# Patient Record
Sex: FEMALE | Race: WHITE | Employment: OTHER | URBAN - METROPOLITAN AREA
[De-identification: names, ages, dates, MRNs, and addresses within clinical notes are randomized per-mention and may not be internally consistent; named-entity substitution may affect disease eponyms.]

---

## 2017-03-01 ENCOUNTER — OFFICE VISIT (OUTPATIENT)
Dept: FAMILY MEDICINE CLINIC | Age: 82
End: 2017-03-01

## 2017-03-01 VITALS
WEIGHT: 142 LBS | TEMPERATURE: 99.2 F | DIASTOLIC BLOOD PRESSURE: 68 MMHG | BODY MASS INDEX: 22.24 KG/M2 | HEART RATE: 75 BPM | SYSTOLIC BLOOD PRESSURE: 136 MMHG | OXYGEN SATURATION: 98 %

## 2017-03-01 DIAGNOSIS — E03.9 ACQUIRED HYPOTHYROIDISM: ICD-10-CM

## 2017-03-01 DIAGNOSIS — I48.0 PAROXYSMAL ATRIAL FIBRILLATION (HCC): Primary | Chronic | ICD-10-CM

## 2017-03-01 DIAGNOSIS — R63.4 WEIGHT LOSS: ICD-10-CM

## 2017-03-01 DIAGNOSIS — F41.1 ANXIETY STATE: ICD-10-CM

## 2017-03-01 DIAGNOSIS — I10 ESSENTIAL HYPERTENSION: Chronic | ICD-10-CM

## 2017-03-01 PROCEDURE — 99214 OFFICE O/P EST MOD 30 MIN: CPT | Performed by: FAMILY MEDICINE

## 2017-03-01 RX ORDER — LORAZEPAM 0.5 MG/1
0.5 TABLET ORAL 3 TIMES DAILY PRN
Qty: 90 TABLET | Refills: 2 | Status: SHIPPED | OUTPATIENT
Start: 2017-03-01 | End: 2017-10-30 | Stop reason: SDUPTHER

## 2017-03-01 ASSESSMENT — ENCOUNTER SYMPTOMS
SHORTNESS OF BREATH: 0
RHINORRHEA: 1
CONSTIPATION: 1

## 2017-04-10 ENCOUNTER — OFFICE VISIT (OUTPATIENT)
Dept: FAMILY MEDICINE CLINIC | Age: 82
End: 2017-04-10

## 2017-04-10 VITALS
HEIGHT: 66 IN | BODY MASS INDEX: 22.82 KG/M2 | DIASTOLIC BLOOD PRESSURE: 72 MMHG | SYSTOLIC BLOOD PRESSURE: 124 MMHG | OXYGEN SATURATION: 98 % | HEART RATE: 73 BPM | WEIGHT: 142 LBS

## 2017-04-10 DIAGNOSIS — F41.1 ANXIETY STATE: ICD-10-CM

## 2017-04-10 DIAGNOSIS — Z01.818 PRE-OP EXAM: Primary | ICD-10-CM

## 2017-04-10 DIAGNOSIS — I48.0 PAROXYSMAL ATRIAL FIBRILLATION (HCC): Chronic | ICD-10-CM

## 2017-04-10 DIAGNOSIS — G60.3 IDIOPATHIC PROGRESSIVE NEUROPATHY: ICD-10-CM

## 2017-04-10 PROCEDURE — 99213 OFFICE O/P EST LOW 20 MIN: CPT | Performed by: FAMILY MEDICINE

## 2017-04-10 RX ORDER — MULTIVIT-MIN/IRON/FOLIC ACID/K 18-600-40
CAPSULE ORAL
COMMUNITY
End: 2017-08-09

## 2017-04-25 ENCOUNTER — TELEPHONE (OUTPATIENT)
Dept: DERMATOLOGY | Age: 82
End: 2017-04-25

## 2017-05-10 RX ORDER — DILTIAZEM HYDROCHLORIDE 180 MG/1
CAPSULE, COATED, EXTENDED RELEASE ORAL
Qty: 90 CAPSULE | Refills: 1 | Status: SHIPPED | OUTPATIENT
Start: 2017-05-10 | End: 2017-11-03 | Stop reason: SDUPTHER

## 2017-06-01 ENCOUNTER — OFFICE VISIT (OUTPATIENT)
Dept: DERMATOLOGY | Age: 82
End: 2017-06-01

## 2017-06-01 DIAGNOSIS — L57.0 AK (ACTINIC KERATOSIS): Primary | ICD-10-CM

## 2017-06-01 PROCEDURE — 99202 OFFICE O/P NEW SF 15 MIN: CPT | Performed by: DERMATOLOGY

## 2017-06-01 RX ORDER — FLUOROURACIL 50 MG/G
CREAM TOPICAL
Qty: 40 G | Refills: 0 | Status: SHIPPED | OUTPATIENT
Start: 2017-06-01 | End: 2017-08-09

## 2017-06-02 ENCOUNTER — OFFICE VISIT (OUTPATIENT)
Dept: FAMILY MEDICINE CLINIC | Age: 82
End: 2017-06-02

## 2017-06-02 VITALS
HEART RATE: 85 BPM | DIASTOLIC BLOOD PRESSURE: 74 MMHG | BODY MASS INDEX: 23.11 KG/M2 | OXYGEN SATURATION: 98 % | SYSTOLIC BLOOD PRESSURE: 132 MMHG | WEIGHT: 141 LBS

## 2017-06-02 DIAGNOSIS — I10 ESSENTIAL HYPERTENSION: Chronic | ICD-10-CM

## 2017-06-02 DIAGNOSIS — E03.9 ACQUIRED HYPOTHYROIDISM: ICD-10-CM

## 2017-06-02 DIAGNOSIS — R53.83 FATIGUE, UNSPECIFIED TYPE: Chronic | ICD-10-CM

## 2017-06-02 DIAGNOSIS — K59.02 CONSTIPATION DUE TO OUTLET DYSFUNCTION: ICD-10-CM

## 2017-06-02 DIAGNOSIS — I48.0 PAROXYSMAL ATRIAL FIBRILLATION (HCC): Primary | Chronic | ICD-10-CM

## 2017-06-02 DIAGNOSIS — D50.0 IRON DEFICIENCY ANEMIA DUE TO CHRONIC BLOOD LOSS: ICD-10-CM

## 2017-06-02 PROCEDURE — 99214 OFFICE O/P EST MOD 30 MIN: CPT | Performed by: FAMILY MEDICINE

## 2017-06-02 ASSESSMENT — ENCOUNTER SYMPTOMS
SHORTNESS OF BREATH: 0
CONSTIPATION: 1
RHINORRHEA: 1

## 2017-06-05 ENCOUNTER — HOSPITAL ENCOUNTER (OUTPATIENT)
Dept: OTHER | Age: 82
Discharge: HOME | End: 2017-06-05
Attending: FAMILY MEDICINE | Admitting: FAMILY MEDICINE

## 2017-06-05 LAB
A/G RATIO: 1.3 (ref 1.1–2.2)
ALBUMIN SERPL-MCNC: 4 G/DL (ref 3.4–5)
ALP BLD-CCNC: 117 U/L (ref 40–129)
ALT SERPL-CCNC: 14 U/L (ref 10–40)
ANION GAP SERPL CALCULATED.3IONS-SCNC: 16 MMOL/L (ref 3–16)
AST SERPL-CCNC: 14 U/L (ref 15–37)
BASOPHILS ABSOLUTE: 0 K/UL (ref 0–0.2)
BASOPHILS RELATIVE PERCENT: 1.2 %
BILIRUB SERPL-MCNC: 0.5 MG/DL (ref 0–1)
BUN BLDV-MCNC: 17 MG/DL (ref 7–20)
CALCIUM SERPL-MCNC: 9.2 MG/DL (ref 8.3–10.6)
CHLORIDE BLD-SCNC: 104 MMOL/L (ref 99–110)
CO2: 23 MMOL/L (ref 21–32)
CREAT SERPL-MCNC: 1.1 MG/DL (ref 0.6–1.2)
EOSINOPHILS ABSOLUTE: 0.1 K/UL (ref 0–0.6)
EOSINOPHILS RELATIVE PERCENT: 3.8 %
FERRITIN: 29.9 NG/ML (ref 15–150)
GFR AFRICAN AMERICAN: 57
GFR NON-AFRICAN AMERICAN: 47
GLOBULIN: 3.2 G/DL
GLUCOSE BLD-MCNC: 95 MG/DL (ref 70–99)
HCT VFR BLD CALC: 39.9 % (ref 36–48)
HEMOGLOBIN: 12.9 G/DL (ref 12–16)
LYMPHOCYTES ABSOLUTE: 1.2 K/UL (ref 1–5.1)
LYMPHOCYTES RELATIVE PERCENT: 32.1 %
MCH RBC QN AUTO: 30.9 PG (ref 26–34)
MCHC RBC AUTO-ENTMCNC: 32.5 G/DL (ref 31–36)
MCV RBC AUTO: 95.3 FL (ref 80–100)
MONOCYTES ABSOLUTE: 0.4 K/UL (ref 0–1.3)
MONOCYTES RELATIVE PERCENT: 9.8 %
NEUTROPHILS ABSOLUTE: 2 K/UL (ref 1.7–7.7)
NEUTROPHILS RELATIVE PERCENT: 53.1 %
PDW BLD-RTO: 13.2 % (ref 12.4–15.4)
PLATELET # BLD: 202 K/UL (ref 135–450)
PMV BLD AUTO: 8.9 FL (ref 5–10.5)
POTASSIUM SERPL-SCNC: 4.5 MMOL/L (ref 3.5–5.1)
RBC # BLD: 4.19 M/UL (ref 4–5.2)
SODIUM BLD-SCNC: 143 MMOL/L (ref 136–145)
T4 FREE: 1.2 NG/DL (ref 0.9–1.8)
TOTAL PROTEIN: 7.2 G/DL (ref 6.4–8.2)
TSH SERPL DL<=0.05 MIU/L-ACNC: 3.62 UIU/ML (ref 0.27–4.2)
WBC # BLD: 3.7 K/UL (ref 4–11)

## 2017-08-09 ENCOUNTER — OFFICE VISIT (OUTPATIENT)
Dept: FAMILY MEDICINE CLINIC | Age: 82
End: 2017-08-09

## 2017-08-09 VITALS
WEIGHT: 142 LBS | SYSTOLIC BLOOD PRESSURE: 138 MMHG | OXYGEN SATURATION: 98 % | DIASTOLIC BLOOD PRESSURE: 62 MMHG | BODY MASS INDEX: 23.27 KG/M2 | HEART RATE: 76 BPM

## 2017-08-09 DIAGNOSIS — H90.3 BILATERAL SENSORINEURAL HEARING LOSS: ICD-10-CM

## 2017-08-09 DIAGNOSIS — Z53.09 CONTRAINDICATION TO ANTICOAGULATION THERAPY: ICD-10-CM

## 2017-08-09 DIAGNOSIS — I48.0 PAROXYSMAL ATRIAL FIBRILLATION (HCC): Primary | Chronic | ICD-10-CM

## 2017-08-09 DIAGNOSIS — E03.9 ACQUIRED HYPOTHYROIDISM: ICD-10-CM

## 2017-08-09 DIAGNOSIS — F41.1 ANXIETY STATE: ICD-10-CM

## 2017-08-09 DIAGNOSIS — R26.89 IMBALANCE: ICD-10-CM

## 2017-08-09 PROCEDURE — 99214 OFFICE O/P EST MOD 30 MIN: CPT | Performed by: FAMILY MEDICINE

## 2017-08-09 RX ORDER — LEVOTHYROXINE SODIUM 0.05 MG/1
50 TABLET ORAL DAILY
Qty: 90 TABLET | Refills: 3 | Status: SHIPPED | OUTPATIENT
Start: 2017-08-09 | End: 2018-07-27 | Stop reason: SDUPTHER

## 2017-10-30 DIAGNOSIS — F41.1 ANXIETY STATE: ICD-10-CM

## 2017-10-31 RX ORDER — LORAZEPAM 0.5 MG/1
TABLET ORAL
Qty: 90 TABLET | Refills: 0 | Status: SHIPPED | OUTPATIENT
Start: 2017-10-31 | End: 2018-01-31 | Stop reason: SDUPTHER

## 2017-11-03 RX ORDER — DILTIAZEM HYDROCHLORIDE 180 MG/1
CAPSULE, COATED, EXTENDED RELEASE ORAL
Qty: 30 CAPSULE | Refills: 0 | Status: SHIPPED | OUTPATIENT
Start: 2017-11-03 | End: 2017-11-17 | Stop reason: SDUPTHER

## 2017-11-17 ENCOUNTER — OFFICE VISIT (OUTPATIENT)
Dept: FAMILY MEDICINE CLINIC | Age: 82
End: 2017-11-17

## 2017-11-17 VITALS
BODY MASS INDEX: 23.6 KG/M2 | HEART RATE: 72 BPM | WEIGHT: 144 LBS | SYSTOLIC BLOOD PRESSURE: 138 MMHG | DIASTOLIC BLOOD PRESSURE: 70 MMHG | OXYGEN SATURATION: 97 %

## 2017-11-17 DIAGNOSIS — I48.0 PAROXYSMAL ATRIAL FIBRILLATION (HCC): Primary | Chronic | ICD-10-CM

## 2017-11-17 DIAGNOSIS — D50.9 IRON DEFICIENCY ANEMIA, UNSPECIFIED IRON DEFICIENCY ANEMIA TYPE: ICD-10-CM

## 2017-11-17 DIAGNOSIS — F41.1 ANXIETY STATE: ICD-10-CM

## 2017-11-17 DIAGNOSIS — E03.9 ACQUIRED HYPOTHYROIDISM: ICD-10-CM

## 2017-11-17 PROCEDURE — 99213 OFFICE O/P EST LOW 20 MIN: CPT | Performed by: FAMILY MEDICINE

## 2017-11-17 RX ORDER — DILTIAZEM HYDROCHLORIDE 180 MG/1
CAPSULE, COATED, EXTENDED RELEASE ORAL
Qty: 30 CAPSULE | Refills: 5 | Status: SHIPPED | OUTPATIENT
Start: 2017-11-17 | End: 2017-12-07 | Stop reason: SDUPTHER

## 2017-11-17 NOTE — PROGRESS NOTES
Constitutional: She is oriented to person, place, and time. She appears well-developed and well-nourished. HENT:   Right Ear: External ear normal.   Left Ear: External ear normal.   Mouth/Throat: Oropharynx is clear and moist.   Neck: Decreased range of motion (she has decreased motion of her neck both in flexion and extension and rotation) present. Cardiovascular: Normal rate and intact distal pulses. An irregularly irregular rhythm present. Few varicosities LE   Pulmonary/Chest: Effort normal and breath sounds normal.   Abdominal: Soft. She exhibits no distension and no mass. There is no tenderness. There is no guarding. Musculoskeletal: She exhibits no edema. Lymphadenopathy:     She has no cervical adenopathy. Neurological: She is alert and oriented to person, place, and time. Psychiatric: Her mood appears anxious. Walter Chavez was seen today for hypertension, anxiety, atrial fibrillation and hypothyroidism. Diagnoses and all orders for this visit:    Paroxysmal atrial fibrillation (HCC)    Anxiety state    Acquired hypothyroidism    Iron deficiency anemia, unspecified iron deficiency anemia type    Other orders  -     diltiazem (CARDIZEM CD) 180 MG extended release capsule; TAKE 1 CAPSULE BY MOUTH DAILY     Patient refuses all immunizations including flu vaccine and pneumonia shot and shingles vaccine. I recommended that she stay on the same medications. [facility] report accessed and reviewed   I recommended that she get a CBC basic metabolic panel TSH and free T4 with copies sent to a cardiologist whom she is seeing next week. I offered to speak with him next Wednesday about her condition.  RTC in 3 months I refilled her Blanco

## 2017-11-18 ENCOUNTER — HOSPITAL ENCOUNTER (OUTPATIENT)
Dept: OTHER | Age: 82
Discharge: HOME | End: 2017-11-18
Attending: FAMILY MEDICINE | Admitting: FAMILY MEDICINE

## 2017-11-18 ENCOUNTER — TELEPHONE (OUTPATIENT)
Dept: FAMILY MEDICINE CLINIC | Age: 82
End: 2017-11-18

## 2017-11-18 LAB
ANION GAP SERPL CALCULATED.3IONS-SCNC: 14 MMOL/L (ref 3–16)
BASOPHILS ABSOLUTE: 0 K/UL (ref 0–0.2)
BASOPHILS RELATIVE PERCENT: 0.8 %
BUN BLDV-MCNC: 18 MG/DL (ref 7–20)
CALCIUM SERPL-MCNC: 9.5 MG/DL (ref 8.3–10.6)
CHLORIDE BLD-SCNC: 105 MMOL/L (ref 99–110)
CO2: 24 MMOL/L (ref 21–32)
CREAT SERPL-MCNC: 1 MG/DL (ref 0.6–1.2)
EOSINOPHILS ABSOLUTE: 0.1 K/UL (ref 0–0.6)
EOSINOPHILS RELATIVE PERCENT: 2.2 %
GFR AFRICAN AMERICAN: >60
GFR NON-AFRICAN AMERICAN: 52
GLUCOSE BLD-MCNC: 75 MG/DL (ref 70–99)
HCT VFR BLD CALC: 38.1 % (ref 36–48)
HEMOGLOBIN: 12.8 G/DL (ref 12–16)
LYMPHOCYTES ABSOLUTE: 1.4 K/UL (ref 1–5.1)
LYMPHOCYTES RELATIVE PERCENT: 37.3 %
MCH RBC QN AUTO: 32.3 PG (ref 26–34)
MCHC RBC AUTO-ENTMCNC: 33.6 G/DL (ref 31–36)
MCV RBC AUTO: 96.1 FL (ref 80–100)
MONOCYTES ABSOLUTE: 0.3 K/UL (ref 0–1.3)
MONOCYTES RELATIVE PERCENT: 9 %
NEUTROPHILS ABSOLUTE: 1.9 K/UL (ref 1.7–7.7)
NEUTROPHILS RELATIVE PERCENT: 50.7 %
PDW BLD-RTO: 13.2 % (ref 12.4–15.4)
PLATELET # BLD: 197 K/UL (ref 135–450)
PMV BLD AUTO: 9.1 FL (ref 5–10.5)
POTASSIUM SERPL-SCNC: 4.9 MMOL/L (ref 3.5–5.1)
RBC # BLD: 3.96 M/UL (ref 4–5.2)
SODIUM BLD-SCNC: 143 MMOL/L (ref 136–145)
T4 FREE: 1.3 NG/DL (ref 0.9–1.8)
TSH SERPL DL<=0.05 MIU/L-ACNC: 2.66 UIU/ML (ref 0.27–4.2)
WBC # BLD: 3.8 K/UL (ref 4–11)

## 2017-12-06 ENCOUNTER — TELEPHONE (OUTPATIENT)
Dept: CARDIOLOGY CLINIC | Age: 82
End: 2017-12-06

## 2017-12-06 NOTE — TELEPHONE ENCOUNTER
Notified patient that per Alondra Haynes would like for her to see a EP doctor soon possibly tomorrow. Please call patient to schedule an office visit.

## 2017-12-07 ENCOUNTER — OFFICE VISIT (OUTPATIENT)
Dept: CARDIOLOGY CLINIC | Age: 82
End: 2017-12-07

## 2017-12-07 VITALS
DIASTOLIC BLOOD PRESSURE: 72 MMHG | BODY MASS INDEX: 22.03 KG/M2 | HEIGHT: 67 IN | WEIGHT: 140.4 LBS | HEART RATE: 72 BPM | SYSTOLIC BLOOD PRESSURE: 124 MMHG | OXYGEN SATURATION: 98 %

## 2017-12-07 DIAGNOSIS — I48.0 PAROXYSMAL ATRIAL FIBRILLATION (HCC): Primary | Chronic | ICD-10-CM

## 2017-12-07 PROCEDURE — 99214 OFFICE O/P EST MOD 30 MIN: CPT | Performed by: INTERNAL MEDICINE

## 2017-12-07 PROCEDURE — 93000 ELECTROCARDIOGRAM COMPLETE: CPT | Performed by: INTERNAL MEDICINE

## 2017-12-07 RX ORDER — DILTIAZEM HYDROCHLORIDE 180 MG/1
CAPSULE, COATED, EXTENDED RELEASE ORAL
Qty: 90 CAPSULE | Refills: 1 | Status: SHIPPED | OUTPATIENT
Start: 2017-12-07 | End: 2018-01-03 | Stop reason: SDUPTHER

## 2017-12-07 NOTE — PROGRESS NOTES
[address]   Cardiac Follow-up    Referring Provider:  Tip Nye MD     Chief Complaint   Patient presents with    Atrial Fibrillation        History of Present Illness: Mrs. Meléndez presents today in follow up for irregular heart beat. She has a history of HTN, and anxiety. She was originally diagnosed with Atrial fib in November of 2011 but converted spontaneously without treatment. She presented to the ER in October 2012 with palpitations. ECG at that time showed sinus arrhythmia. She then wore a Holter monitor in 10/2012 which showed short burst of PAF and was started on Xarelto. Event recorder 2014 showed PAC's and brief PAT. She takes Ativan as needed for her anxiety. Today, Ms. Meléndez reports having an irregular heart beat. She doesn't necessarily feel this but when she takes her blood pressure her machine tells her she is having an irregular rhythm. She checks her blood pressure daily. She admits to being overly anxious and continues on Ativan for this. EKG shows SR with PAC's today. She recently (11/2) wore a monitor for ten days which was with [employer] [facility]. She is unaware of what it showed, but it was recommended that she take amiodarone. Past Medical History:   has a past medical history of Anxiety; Atrial fibrillation ([address] Disorder of bone and cartilage, unspecified; Fatigue; Hypertension; Migraine, unspecified, without mention of intractable migraine without mention of status migrainosus; Other disorders of kidney and ureter; Peripheral neuropathy ([address] Psychiatric problem; Risk for falls; Spinal stenosis; Thyroid disease; Unspecified glaucoma(365.9); and Unspecified hearing loss. Surgical History:   has a past surgical history that includes Colon surgery; Kidney surgery; Ovary removal; Appendectomy; eye surgery; and Cataract removal (Bilateral, 04/2017). Social History:   reports that she has never smoked.  She has never used smokeless tobacco. She reports that she does not drink alcohol or use drugs. Family History:  family history includes Heart Disease in her father, mother, and paternal aunt; High Blood Pressure in her mother; High Cholesterol in her mother; Stroke in her father. Home Medications:  Outpatient Encounter Prescriptions as of 12/7/2017   Medication Sig Dispense Refill    metoprolol tartrate (LOPRESSOR) 25 MG tablet Take 1 tablet by mouth daily 90 tablet 1    diltiazem (CARDIZEM CD) 180 MG extended release capsule TAKE 1 CAPSULE BY MOUTH DAILY 90 capsule 1    LORazepam (ATIVAN) 0.5 MG tablet TAKE ONE TABLET BY MOUTH THREE TIMES A DAY AS NEEDED FOR ANXIETY 90 tablet 0    levothyroxine (SYNTHROID) 50 MCG tablet Take 1 tablet by mouth daily 90 tablet 3    aspirin 81 MG tablet Take 81 mg by mouth 2 times daily      Multiple Vitamins-Minerals (THERAPEUTIC MULTIVITAMIN-MINERALS) tablet Take 1 tablet by mouth every other day      [DISCONTINUED] diltiazem (CARDIZEM CD) 180 MG extended release capsule TAKE 1 CAPSULE BY MOUTH DAILY 30 capsule 5     No facility-administered encounter medications on file as of 12/7/2017. Allergies:  Zoloft [sertraline hcl] and Sertraline     Review of Systems:   · Constitutional: there has been no unanticipated weight loss. There's been no change in energy level, sleep pattern, or activity level. · Eyes: No visual changes or diplopia. No scleral icterus. · ENT: No Headaches, hearing loss or vertigo. No mouth sores or sore throat. · Cardiovascular: Reviewed in HPI    · Respiratory: No cough or wheezing, no sputum production. No hematemesis. · Gastrointestinal: No abdominal pain, appetite loss, blood in stools. No change in bowel or bladder habits. · Genitourinary: No dysuria, trouble voiding, or hematuria. · Musculoskeletal:  No gait disturbance, weakness or joint complaints. · Integumentary: No rash or pruritis.   · Neurological: No headache, diplopia, change in muscle strength, numbness or tingling. No change in gait, balance, coordination, mood, affect, memory, mentation, behavior. · Psychiatric: History of anxiety  · Endocrine: No malaise, fatigue or temperature intolerance. No excessive thirst, fluid intake, or urination. No tremor. · Hematologic/Lymphatic: No abnormal bruising or bleeding, blood clots or swollen lymph nodes. · Allergic/Immunologic: No nasal congestion or hives. Physical Examination:    Vitals:    12/07/17 1152   BP: 124/72   Pulse: 72   SpO2: 98%        Constitutional and General Appearance: NAD   Respiratory:  · Normal excursion and expansion without use of accessory muscles  · Resp Auscultation: Normal breath sounds without dullness  Cardiovascular:  · The apical impulses not displaced  · Heart tones are crisp and normal  · Cervical veins are not engorged  · The carotid upstroke is normal in amplitude and contour without delay or bruit  · Normal S1S2, No S3, AS Murmur,  Rhythm is regular   · Peripheral pulses are symmetrical and full  · There is no clubbing, cyanosis of the extremities. · No edema. · Femoral Arteries: 2+ and equal  · Pedal Pulses: 2+ and equal   Abdomen:  · No masses or tenderness  · Liver/Spleen: No Abnormalities Noted  Neurological/Psychiatric:  · Alert and oriented in all spheres  · Moves all extremities well  · Exhibits normal gait balance and coordination  · No abnormalities of mood, affect, memory, mentation, or behavior are noted    Myoview 2/2017 normal    Echo 2/2016  Summary   -Normal left ventricle size, wall thickness and systolic function with an   estimated ejection fraction of 55%.   -Mild to moderate mitral regurgitation is present.   -Trivial aortic regurgitation is present.   -There is mild tricuspid regurgitation with RVSP estimated at 42 mmHg. Echo  5/13  Preserved LV systolic function with EF of 60%  Mild mitral regurgitation is present. Mild tricuspid regurgitation with RVSP estimated at 39 mmHg.     EKG today 12/7/17: SR PAC's HR 69    Assessment:     1. Paroxysmal Atrial fibrillation  PAF: In NSR with PAC's today  Event recorder 4/2014- PAF- burden unclear but probably minimal  Recent monitor worn with [facility]-will obtain report. Most of her ECGs show sinus rhythm with very frequent PACs, which might be mistaken for AF. 4. Anxiety/Depression: treated with prn Ativan> takes 3-5 a week. Plan:  1. Start Metoprolol 25 mg daily  2. Continue Cardizem for now  3. Will try to obtain report from monitor worn in November with [facility]  4.   Follow up in 3 months         Thank you for allowing me to participate in the care of this individual.    Naida Miguel MD

## 2017-12-08 ENCOUNTER — TELEPHONE (OUTPATIENT)
Dept: CARDIOLOGY CLINIC | Age: 82
End: 2017-12-08

## 2017-12-08 NOTE — TELEPHONE ENCOUNTER
Saw Dewey Kaufman yesterday and was confused . He told her in 2014 that she had a fib , but yesterday he told her she doesn't have a fib and he doesn't need to see her for 3 months. Should she keep appt with LES 12/21/17 ? Does she have a fib , did she not understand him ? Can nurse call her to explain ?

## 2017-12-08 NOTE — TELEPHONE ENCOUNTER
Called Dr Sol Aid office to obtain reports from event monitor worn 11/2 for 2 weeks. LMOM for JUAN Valdovinos. Left office number and fax number.

## 2017-12-11 ENCOUNTER — TELEPHONE (OUTPATIENT)
Dept: CARDIOLOGY CLINIC | Age: 82
End: 2017-12-11

## 2017-12-11 NOTE — TELEPHONE ENCOUNTER
Pt calling to adv that metoprolol is not agreeing with her it is slowing her heart beat down to 35.  Please call to adv thank you

## 2017-12-13 ENCOUNTER — NURSE ONLY (OUTPATIENT)
Dept: CARDIOLOGY CLINIC | Age: 82
End: 2017-12-13

## 2017-12-13 ENCOUNTER — TELEPHONE (OUTPATIENT)
Dept: CARDIOLOGY CLINIC | Age: 82
End: 2017-12-13

## 2017-12-13 DIAGNOSIS — R00.2 PALPITATIONS: Primary | Chronic | ICD-10-CM

## 2017-12-13 DIAGNOSIS — R53.83 OTHER FATIGUE: Chronic | ICD-10-CM

## 2017-12-13 DIAGNOSIS — I48.0 PAROXYSMAL ATRIAL FIBRILLATION (HCC): Chronic | ICD-10-CM

## 2017-12-13 PROCEDURE — 93000 ELECTROCARDIOGRAM COMPLETE: CPT | Performed by: INTERNAL MEDICINE

## 2017-12-13 NOTE — TELEPHONE ENCOUNTER
Patient came in for ECG (similar to ECG done in office on 12/7/17 - SR with PACs). Patient was prescribed Metoprolol 25 mg daily at recent visit. She took one dose, but felt funny several hours afterwards and did not take med again because it was the weekend and she didn't know what to do. She admits to being very nervous and wonders of her nervous makes her feel bad (tired, no energy etc). Will try to get event monitor results from Dr. Peña Padilla in [place] which was mentioned in recent visit note. She is asking if she should try Metoprolol again or should Diltiazem (currently on  mg) be increased as she is tolerating this medication. She also has appt with Dr. Estela Schaffer next week. Please advise of any changes needed.

## 2017-12-13 NOTE — TELEPHONE ENCOUNTER
Called patient and gave her med recommendations from Dr. Tristan العلي. She verbalized understanding. MCOT from Dr. Anna Amaro (cardiologist) are scanned in chart under media tab for your review.

## 2017-12-21 ENCOUNTER — OFFICE VISIT (OUTPATIENT)
Dept: CARDIOLOGY CLINIC | Age: 82
End: 2017-12-21

## 2017-12-21 VITALS
DIASTOLIC BLOOD PRESSURE: 60 MMHG | HEIGHT: 67 IN | SYSTOLIC BLOOD PRESSURE: 130 MMHG | HEART RATE: 64 BPM | WEIGHT: 143.5 LBS | BODY MASS INDEX: 22.52 KG/M2

## 2017-12-21 DIAGNOSIS — I48.0 PAROXYSMAL ATRIAL FIBRILLATION (HCC): Primary | Chronic | ICD-10-CM

## 2017-12-21 DIAGNOSIS — I10 ESSENTIAL HYPERTENSION: Chronic | ICD-10-CM

## 2017-12-21 DIAGNOSIS — R00.2 PALPITATIONS: Chronic | ICD-10-CM

## 2017-12-21 PROCEDURE — 99214 OFFICE O/P EST MOD 30 MIN: CPT | Performed by: INTERNAL MEDICINE

## 2017-12-21 NOTE — PROGRESS NOTES
[address]   Cardiac Follow-up    Referring Provider:  Opal Li MD     Chief Complaint   Patient presents with    Atrial Fibrillation    Hypertension    1 Year Follow Up        History of Present Illness: Mrs. Dolores Mahmood presents today in follow up for irregular heart beat. She has a history of HTN, and anxiety. She was originally diagnosed with Atrial fib in November of 2011 but converted spontaneously without treatment. She presented to the ER in October 2012 with palpitations. ECG at that time showed sinus arrhythmia. She then wore a Holter monitor in 10/2012 which showed short burst of PAF and was started on Xarelto. Event recorder 2014 showed PAC's and brief PAT. She takes Ativan as needed for her anxiety. Ms. Dolores Mahmood reports having an irregular heart beat. She doesn't necessarily feel this but when she takes her blood pressure her machine tells her she is having an irregular rhythm. She checks her blood pressure daily. She admits to being overly anxious and continues on Ativan for this. EKG shows SR with PAC's today. She recently (11/2) wore a monitor for ten days which was with Coni Valerio. She is unaware of what it showed, but it was recommended that she take amiodarone. Today she is here to establish with me, was a prior patient of Pham Glez. Sts is not on anticoagulation therapy due to anemia. Remains concerned with irregular heart rates. Says some days are better than others. Activity is limited by neuropathy and walks with a hurry cane. Has been monitoring BP am daily and ranges from 110/70 to 129/76. Past Medical History:   has a past medical history of Anxiety; Atrial fibrillation ([address] Disorder of bone and cartilage, unspecified; Fatigue; Hypertension; Migraine, unspecified, without mention of intractable migraine without mention of status migrainosus; Other disorders of kidney and ureter; Peripheral neuropathy ([address] Psychiatric problem;  Risk for regurgitation is present.   -Trivial aortic regurgitation is present.   -There is mild tricuspid regurgitation with RVSP estimated at 42 mmHg. Echo  5/13  Preserved LV systolic function with EF of 60%  Mild mitral regurgitation is present. Mild tricuspid regurgitation with RVSP estimated at 39 mmHg. EKG  12/7/17: SR PAC's HR 69    Assessment:     1. Paroxysmal Atrial fibrillation  PAF: In NSR with PAC's today  Event recorder 4/2014- PAF- burden unclear but probably minimal  Recent monitor worn with [facility]-report viewed in Care Everywhere  Most of her ECGs show sinus rhythm with very frequent PACs, which might be mistaken for AF. 4. Anxiety/Depression: treated with prn Ativan> takes 3-5 a week. Was reassured, that by recent cardiac testing, her cardiac status is stable. Plan:  1. Stop metoprolol as per   2. Continue Cardizem , take aspirin only one time a day. May take melatonin or Tylenol PM for sleep      Discussed concern for use of Ativan at bedtime as she may be unstable walking if getting up to the bathroom.   3.  See me in 6 months , same day as              Thank you for allowing me to participate in the care of this individual.

## 2018-01-04 RX ORDER — DILTIAZEM HYDROCHLORIDE 180 MG/1
CAPSULE, COATED, EXTENDED RELEASE ORAL
Qty: 90 CAPSULE | Refills: 1 | Status: SHIPPED | OUTPATIENT
Start: 2018-01-04 | End: 2018-11-02 | Stop reason: SDUPTHER

## 2018-01-25 ENCOUNTER — OFFICE VISIT (OUTPATIENT)
Dept: FAMILY MEDICINE CLINIC | Age: 83
End: 2018-01-25

## 2018-01-25 VITALS
HEART RATE: 62 BPM | WEIGHT: 145 LBS | TEMPERATURE: 98.5 F | BODY MASS INDEX: 22.71 KG/M2 | OXYGEN SATURATION: 98 % | DIASTOLIC BLOOD PRESSURE: 62 MMHG | SYSTOLIC BLOOD PRESSURE: 132 MMHG

## 2018-01-25 DIAGNOSIS — N39.0 URINARY TRACT INFECTION WITHOUT HEMATURIA, SITE UNSPECIFIED: Primary | ICD-10-CM

## 2018-01-25 LAB
BACTERIA URINE, POC: 0
BILIRUBIN URINE: 0 MG/DL
BLOOD, URINE: POSITIVE
CASTS URINE, POC: 0
CLARITY: ABNORMAL
COLOR: YELLOW
CRYSTALS URINE, POC: 0
EPI CELLS URINE, POC: 0
GLUCOSE URINE: NEGATIVE
KETONES, URINE: NEGATIVE
LEUKOCYTE EST, POC: ABNORMAL
NITRITE, URINE: NEGATIVE
PH UA: 5.5 (ref 4.5–8)
PROTEIN UA: NEGATIVE
RBC URINE, POC: 0
SPECIFIC GRAVITY UA: 1 (ref 1–1.03)
UROBILINOGEN, URINE: NORMAL
WBC URINE, POC: 8
YEAST URINE, POC: 0

## 2018-01-25 PROCEDURE — 81000 URINALYSIS NONAUTO W/SCOPE: CPT | Performed by: FAMILY MEDICINE

## 2018-01-25 PROCEDURE — 99212 OFFICE O/P EST SF 10 MIN: CPT | Performed by: FAMILY MEDICINE

## 2018-01-25 RX ORDER — CIPROFLOXACIN 500 MG/1
500 TABLET, FILM COATED ORAL 2 TIMES DAILY
Qty: 10 TABLET | Refills: 0 | Status: SHIPPED | OUTPATIENT
Start: 2018-01-25 | End: 2018-01-31

## 2018-01-31 ENCOUNTER — OFFICE VISIT (OUTPATIENT)
Dept: FAMILY MEDICINE CLINIC | Age: 83
End: 2018-01-31

## 2018-01-31 VITALS
SYSTOLIC BLOOD PRESSURE: 136 MMHG | HEART RATE: 83 BPM | OXYGEN SATURATION: 98 % | WEIGHT: 142 LBS | DIASTOLIC BLOOD PRESSURE: 84 MMHG | BODY MASS INDEX: 22.24 KG/M2

## 2018-01-31 DIAGNOSIS — F41.1 ANXIETY STATE: ICD-10-CM

## 2018-01-31 DIAGNOSIS — R35.0 URINARY FREQUENCY: ICD-10-CM

## 2018-01-31 DIAGNOSIS — I48.0 PAROXYSMAL ATRIAL FIBRILLATION (HCC): Chronic | ICD-10-CM

## 2018-01-31 DIAGNOSIS — I10 ESSENTIAL HYPERTENSION: Primary | Chronic | ICD-10-CM

## 2018-01-31 PROCEDURE — 99214 OFFICE O/P EST MOD 30 MIN: CPT | Performed by: FAMILY MEDICINE

## 2018-01-31 RX ORDER — LORAZEPAM 0.5 MG/1
TABLET ORAL
Qty: 90 TABLET | Refills: 0 | Status: SHIPPED | OUTPATIENT
Start: 2018-01-31 | End: 2018-02-28

## 2018-01-31 NOTE — PROGRESS NOTES
Chief Complaint   Patient presents with    Hypertension    Atrial Fibrillation    Hypothyroidism    Anxiety      Patient has complaints of :  Frequent urination times 3-4 weeks     Electronically signed by Tamanna Milan MA on 1/31/2018 at 2:28 PM     continue with frequent urination   did not take cipro, She was worried about side effects  Continues with imbalance and using cane   no falls    Patient is here for follow-up of the following problems:  Chief Complaint   Patient presents with    Hypertension    Atrial Fibrillation    Hypothyroidism    Anxiety      Take lorazepam prn anxiety, not every day    Hypertension:  Home blood pressure monitoring: No.  She is adherent to a low sodium diet. Patient denies chest pain and shortness of breath. Antihypertensive medication side effects: no medication side effects noted. Use of agents associated with hypertension: none. Sodium (mmol/L)   Date Value   11/18/2017 143    BUN (mg/dL)   Date Value   11/18/2017 18    Glucose (mg/dL)   Date Value   11/18/2017 75      Potassium (mmol/L)   Date Value   11/18/2017 4.9    CREATININE (mg/dL)   Date Value   11/18/2017 1.0              ROS         Physical Exam   Constitutional: She is oriented to person, place, and time. She appears well-developed and well-nourished. HENT:   Right Ear: External ear normal.   Left Ear: External ear normal.   Mouth/Throat: Oropharynx is clear and moist.   Cardiovascular: Normal rate and intact distal pulses. An irregularly irregular rhythm present. Few varicosities LE   Pulmonary/Chest: Effort normal and breath sounds normal.   Abdominal: Soft. She exhibits no distension and no mass. There is no tenderness. There is no guarding. Musculoskeletal: She exhibits no edema. Lymphadenopathy:     She has no cervical adenopathy. Neurological: She is alert and oriented to person, place, and time. Psychiatric: Her mood appears anxious.

## 2018-02-03 LAB
COLOR: NORMAL
CULTURE: ABNORMAL

## 2018-03-06 ENCOUNTER — OFFICE VISIT (OUTPATIENT)
Dept: CARDIOLOGY CLINIC | Age: 83
End: 2018-03-06

## 2018-03-06 VITALS
WEIGHT: 143 LBS | HEART RATE: 84 BPM | HEIGHT: 67 IN | DIASTOLIC BLOOD PRESSURE: 60 MMHG | BODY MASS INDEX: 22.44 KG/M2 | SYSTOLIC BLOOD PRESSURE: 138 MMHG

## 2018-03-06 DIAGNOSIS — R00.2 PALPITATIONS: Chronic | ICD-10-CM

## 2018-03-06 DIAGNOSIS — I48.91 ATRIAL FIBRILLATION, UNSPECIFIED TYPE (HCC): Primary | ICD-10-CM

## 2018-03-06 PROCEDURE — 99214 OFFICE O/P EST MOD 30 MIN: CPT | Performed by: NURSE PRACTITIONER

## 2018-03-06 PROCEDURE — 93000 ELECTROCARDIOGRAM COMPLETE: CPT | Performed by: NURSE PRACTITIONER

## 2018-03-06 RX ORDER — LORAZEPAM 0.5 MG/1
0.5 TABLET ORAL EVERY 6 HOURS PRN
COMMUNITY
End: 2018-07-27 | Stop reason: SDUPTHER

## 2018-03-06 NOTE — LETTER
mention of status migrainosus; Other disorders of kidney and ureter; Peripheral neuropathy ([address] Psychiatric problem; Risk for falls; Spinal stenosis; Thyroid disease; Unspecified glaucoma(365.9); and Unspecified hearing loss. Surgical History:   has a past surgical history that includes Colon surgery; Kidney surgery; Ovary removal; Appendectomy; eye surgery; and Cataract removal (Bilateral, 04/2017). Social History:   reports that she has never smoked. She has never used smokeless tobacco. She reports that she does not drink alcohol or use drugs. Family History:  family history includes Heart Disease in her father, mother, and paternal aunt; High Blood Pressure in her mother; High Cholesterol in her mother; Stroke in her father. Home Medications:  Outpatient Encounter Prescriptions as of 3/6/2018   Medication Sig Dispense Refill    LORazepam (ATIVAN) 0.5 MG tablet Take 0.5 mg by mouth every 6 hours as needed for Anxiety.  diltiazem (CARDIZEM CD) 180 MG extended release capsule TAKE 1 CAPSULE BY MOUTH DAILY 90 capsule 1    levothyroxine (SYNTHROID) 50 MCG tablet Take 1 tablet by mouth daily 90 tablet 3    aspirin 81 MG tablet Take 81 mg by mouth daily       Multiple Vitamins-Minerals (THERAPEUTIC MULTIVITAMIN-MINERALS) tablet Take 1 tablet by mouth every other day       No facility-administered encounter medications on file as of 3/6/2018. Allergies:  Zoloft [sertraline hcl] and Sertraline     Review of Systems:   · Constitutional: there has been no unanticipated weight loss. There's been no change in energy level, sleep pattern, or activity level. · Eyes: No visual changes or diplopia. No scleral icterus. · ENT: No Headaches, hearing loss or vertigo. No mouth sores or sore throat. · Cardiovascular: Reviewed in HPI    · Respiratory: No cough or wheezing, no sputum production. No hematemesis. · Gastrointestinal: No abdominal pain, appetite loss, blood in stools.  No

## 2018-03-06 NOTE — LETTER
mention of status migrainosus; Other disorders of kidney and ureter; Peripheral neuropathy ([address] Psychiatric problem; Risk for falls; Spinal stenosis; Thyroid disease; Unspecified glaucoma(365.9); and Unspecified hearing loss. Surgical History:   has a past surgical history that includes Colon surgery; Kidney surgery; Ovary removal; Appendectomy; eye surgery; and Cataract removal (Bilateral, 04/2017). Social History:   reports that she has never smoked. She has never used smokeless tobacco. She reports that she does not drink alcohol or use drugs. Family History:  family history includes Heart Disease in her father, mother, and paternal aunt; High Blood Pressure in her mother; High Cholesterol in her mother; Stroke in her father. Home Medications:  Outpatient Encounter Prescriptions as of 3/6/2018   Medication Sig Dispense Refill    LORazepam (ATIVAN) 0.5 MG tablet Take 0.5 mg by mouth every 6 hours as needed for Anxiety.  diltiazem (CARDIZEM CD) 180 MG extended release capsule TAKE 1 CAPSULE BY MOUTH DAILY 90 capsule 1    levothyroxine (SYNTHROID) 50 MCG tablet Take 1 tablet by mouth daily 90 tablet 3    aspirin 81 MG tablet Take 81 mg by mouth daily       Multiple Vitamins-Minerals (THERAPEUTIC MULTIVITAMIN-MINERALS) tablet Take 1 tablet by mouth every other day       No facility-administered encounter medications on file as of 3/6/2018. Allergies:  Zoloft [sertraline hcl] and Sertraline     Review of Systems:   · Constitutional: there has been no unanticipated weight loss. There's been no change in energy level, sleep pattern, or activity level. · Eyes: No visual changes or diplopia. No scleral icterus. · ENT: No Headaches, hearing loss or vertigo. No mouth sores or sore throat. · Cardiovascular: Reviewed in HPI    · Respiratory: No cough or wheezing, no sputum production. No hematemesis. · Gastrointestinal: No abdominal pain, appetite loss, blood in stools.  No change in bowel or bladder habits. · Genitourinary: No dysuria, trouble voiding, or hematuria. · Musculoskeletal:  No gait disturbance, weakness or joint complaints. · Integumentary: No rash or pruritis. · Neurological: No headache, diplopia, change in muscle strength, numbness or tingling. No change in gait, balance, coordination, mood, affect, memory, mentation, behavior. · Psychiatric: History of anxiety  · Endocrine: No malaise, fatigue or temperature intolerance. No excessive thirst, fluid intake, or urination. No tremor. · Hematologic/Lymphatic: No abnormal bruising or bleeding, blood clots or swollen lymph nodes. · Allergic/Immunologic: No nasal congestion or hives. Physical Examination:    Vitals:    03/06/18 1125   BP: 138/60   Pulse: 84        Constitutional and General Appearance: NAD   Skin:good turgor,intact without lesions  HEENT: EOMI ,normal  Neck:no JVD    Respiratory:  · Normal excursion and expansion without use of accessory muscles  · Resp Auscultation: Normal breath sounds without dullness  Cardiovascular:  · The apical impulses not displaced  · Heart tones are crisp and normal  · Cervical veins are not engorged  · The carotid upstroke is normal in amplitude and contour without delay or bruit  · Normal S1S2, No S3, AS Murmur,  Rhythm is irregular   · Peripheral pulses are symmetrical and full  · There is no clubbing, cyanosis of the extremities. · No edema. · Femoral Arteries: 2+ and equal  · Pedal Pulses: 2+ and equal   Abdomen:  · No masses or tenderness  · Liver/Spleen: No Abnormalities Noted  Neurological/Psychiatric:  · Alert and oriented in all spheres  · Moves all extremities well  · Exhibits normal gait balance and coordination  · No abnormalities of mood, affect, memory, mentation, or behavior are noted    Myoview 2/2017 normal    Echo 2/2016  Summary   -Normal left ventricle size, wall thickness and systolic function with an   estimated ejection fraction of 55%.  -Mild to moderate mitral regurgitation is present.   -Trivial aortic regurgitation is present.   -There is mild tricuspid regurgitation with RVSP estimated at 42 mmHg. Echo  5/13  Preserved LV systolic function with EF of 60%  Mild mitral regurgitation is present. Mild tricuspid regurgitation with RVSP estimated at 39 mmHg. Assessment:     1. Paroxysmal Atrial fibrillation  PAF: In NSR with PAC's today  Event recorder 4/2014- PAF- burden unclear but probably minimal  Recent monitor worn with [facility]-report viewed in Care Everywhere  Most of her ECGs show sinus rhythm with very frequent PACs, which might be mistaken for AF. 4. Anxiety/Depression: treated with prn Ativan> takes 3-5 a week. Was reassured, that by recent cardiac testing, her cardiac status is stable. Plan:  1. No change in medications  2. OV 6 months             Thank you for allowing me to participate in the care of this individual.            If you have questions, please do not hesitate to call me. I look forward to following James Suero along with you.     Sincerely,        Dixie Bolton NP

## 2018-03-06 NOTE — PROGRESS NOTES
History:   reports that she has never smoked. She has never used smokeless tobacco. She reports that she does not drink alcohol or use drugs. Family History:  family history includes Heart Disease in her father, mother, and paternal aunt; High Blood Pressure in her mother; High Cholesterol in her mother; Stroke in her father. Home Medications:  Outpatient Encounter Prescriptions as of 3/6/2018   Medication Sig Dispense Refill    LORazepam (ATIVAN) 0.5 MG tablet Take 0.5 mg by mouth every 6 hours as needed for Anxiety.  diltiazem (CARDIZEM CD) 180 MG extended release capsule TAKE 1 CAPSULE BY MOUTH DAILY 90 capsule 1    levothyroxine (SYNTHROID) 50 MCG tablet Take 1 tablet by mouth daily 90 tablet 3    aspirin 81 MG tablet Take 81 mg by mouth daily       Multiple Vitamins-Minerals (THERAPEUTIC MULTIVITAMIN-MINERALS) tablet Take 1 tablet by mouth every other day       No facility-administered encounter medications on file as of 3/6/2018. Allergies:  Zoloft [sertraline hcl] and Sertraline     Review of Systems:   · Constitutional: there has been no unanticipated weight loss. There's been no change in energy level, sleep pattern, or activity level. · Eyes: No visual changes or diplopia. No scleral icterus. · ENT: No Headaches, hearing loss or vertigo. No mouth sores or sore throat. · Cardiovascular: Reviewed in HPI    · Respiratory: No cough or wheezing, no sputum production. No hematemesis. · Gastrointestinal: No abdominal pain, appetite loss, blood in stools. No change in bowel or bladder habits. · Genitourinary: No dysuria, trouble voiding, or hematuria. · Musculoskeletal:  No gait disturbance, weakness or joint complaints. · Integumentary: No rash or pruritis. · Neurological: No headache, diplopia, change in muscle strength, numbness or tingling. No change in gait, balance, coordination, mood, affect, memory, mentation, behavior.   · Psychiatric: History of anxiety  · Endocrine:

## 2018-03-07 NOTE — COMMUNICATION BODY
No malaise, fatigue or temperature intolerance. No excessive thirst, fluid intake, or urination. No tremor. · Hematologic/Lymphatic: No abnormal bruising or bleeding, blood clots or swollen lymph nodes. · Allergic/Immunologic: No nasal congestion or hives. Physical Examination:    Vitals:    03/06/18 1125   BP: 138/60   Pulse: 84        Constitutional and General Appearance: NAD   Skin:good turgor,intact without lesions  HEENT: EOMI ,normal  Neck:no JVD    Respiratory:  · Normal excursion and expansion without use of accessory muscles  · Resp Auscultation: Normal breath sounds without dullness  Cardiovascular:  · The apical impulses not displaced  · Heart tones are crisp and normal  · Cervical veins are not engorged  · The carotid upstroke is normal in amplitude and contour without delay or bruit  · Normal S1S2, No S3, AS Murmur,  Rhythm is irregular   · Peripheral pulses are symmetrical and full  · There is no clubbing, cyanosis of the extremities. · No edema. · Femoral Arteries: 2+ and equal  · Pedal Pulses: 2+ and equal   Abdomen:  · No masses or tenderness  · Liver/Spleen: No Abnormalities Noted  Neurological/Psychiatric:  · Alert and oriented in all spheres  · Moves all extremities well  · Exhibits normal gait balance and coordination  · No abnormalities of mood, affect, memory, mentation, or behavior are noted    Myoview 2/2017 normal    Echo 2/2016  Summary   -Normal left ventricle size, wall thickness and systolic function with an   estimated ejection fraction of 55%.   -Mild to moderate mitral regurgitation is present.   -Trivial aortic regurgitation is present.   -There is mild tricuspid regurgitation with RVSP estimated at 42 mmHg. Echo  5/13  Preserved LV systolic function with EF of 60%  Mild mitral regurgitation is present. Mild tricuspid regurgitation with RVSP estimated at 39 mmHg. Assessment:     1.  Paroxysmal Atrial fibrillation  PAF: In NSR with PAC's today  Event recorder 4/2014-

## 2018-03-15 ENCOUNTER — TELEPHONE (OUTPATIENT)
Dept: FAMILY MEDICINE CLINIC | Age: 83
End: 2018-03-15

## 2018-03-16 ENCOUNTER — TELEPHONE (OUTPATIENT)
Dept: ADMINISTRATIVE | Age: 83
End: 2018-03-16

## 2018-03-19 ENCOUNTER — OFFICE VISIT (OUTPATIENT)
Dept: FAMILY MEDICINE CLINIC | Age: 83
End: 2018-03-19

## 2018-03-19 VITALS
HEART RATE: 78 BPM | DIASTOLIC BLOOD PRESSURE: 82 MMHG | WEIGHT: 141 LBS | OXYGEN SATURATION: 98 % | SYSTOLIC BLOOD PRESSURE: 144 MMHG | BODY MASS INDEX: 22.08 KG/M2

## 2018-03-19 DIAGNOSIS — S20.222D CONTUSION OF LEFT SIDE OF BACK, SUBSEQUENT ENCOUNTER: ICD-10-CM

## 2018-03-19 DIAGNOSIS — R26.89 IMBALANCE: Primary | ICD-10-CM

## 2018-03-19 PROBLEM — S20.222A CONTUSION OF LEFT SIDE OF BACK: Status: ACTIVE | Noted: 2018-03-19

## 2018-03-19 PROCEDURE — 99213 OFFICE O/P EST LOW 20 MIN: CPT | Performed by: FAMILY MEDICINE

## 2018-03-19 NOTE — PROGRESS NOTES
Subjective:      Patient ID: Courtney Kerr is a 80 y.o. female. HPI Patient is here for a Hospital follow up exam.    She was seen at [facility] on 3/15/18 for a fall. She fell backwards out of the shower and hit her left side on toilet and landed on the floor. Patient fell in the shower or getting out of the shower. She apparently has a shower chair but does not have a handheld shower and is weak getting up and sitting down. And fell out of the shower striking her lower back on the toilet she did not hit her head on the floor. She is alert on the floor for about 10 minutes because her  did not hear her fall. The patient has chronic numbness in her feet and some imbalance with this. She normally uses a cane to assist with ambulation. This happened 6 days ago and patient reports that her pain is still significant but slowly improving. She is having trouble getting in and out of the car and laying in bed. Current Outpatient Prescriptions on File Prior to Visit   Medication Sig Dispense Refill    LORazepam (ATIVAN) 0.5 MG tablet Take 0.5 mg by mouth every 6 hours as needed for Anxiety.  diltiazem (CARDIZEM CD) 180 MG extended release capsule TAKE 1 CAPSULE BY MOUTH DAILY 90 capsule 1    levothyroxine (SYNTHROID) 50 MCG tablet Take 1 tablet by mouth daily 90 tablet 3    aspirin 81 MG tablet Take 81 mg by mouth daily       Multiple Vitamins-Minerals (THERAPEUTIC MULTIVITAMIN-MINERALS) tablet Take 1 tablet by mouth every other day       No current facility-administered medications on file prior to visit. Review of Systems    Objective:   Physical Exam   Constitutional: She appears well-developed and well-nourished. Cardiovascular: Normal rate. An irregularly irregular rhythm present. Musculoskeletal:   There is no tenderness along the spine. There is a moderate sized bruise noted over the left iliac crest extending into the left buttocks area.  fair range of motion in the hip

## 2018-04-25 ENCOUNTER — OFFICE VISIT (OUTPATIENT)
Dept: FAMILY MEDICINE CLINIC | Age: 83
End: 2018-04-25

## 2018-04-25 VITALS
WEIGHT: 141 LBS | HEART RATE: 79 BPM | SYSTOLIC BLOOD PRESSURE: 138 MMHG | OXYGEN SATURATION: 98 % | DIASTOLIC BLOOD PRESSURE: 64 MMHG | BODY MASS INDEX: 22.08 KG/M2

## 2018-04-25 DIAGNOSIS — F41.1 ANXIETY STATE: ICD-10-CM

## 2018-04-25 DIAGNOSIS — R53.82 CHRONIC FATIGUE: Chronic | ICD-10-CM

## 2018-04-25 DIAGNOSIS — I48.0 PAROXYSMAL ATRIAL FIBRILLATION (HCC): Primary | Chronic | ICD-10-CM

## 2018-04-25 PROCEDURE — 99214 OFFICE O/P EST MOD 30 MIN: CPT | Performed by: FAMILY MEDICINE

## 2018-06-20 ENCOUNTER — OFFICE VISIT (OUTPATIENT)
Dept: CARDIOLOGY CLINIC | Age: 83
End: 2018-06-20

## 2018-06-20 VITALS
HEIGHT: 66 IN | BODY MASS INDEX: 22.98 KG/M2 | DIASTOLIC BLOOD PRESSURE: 72 MMHG | SYSTOLIC BLOOD PRESSURE: 136 MMHG | WEIGHT: 143 LBS | HEART RATE: 68 BPM

## 2018-06-20 DIAGNOSIS — I48.0 PAROXYSMAL ATRIAL FIBRILLATION (HCC): Primary | Chronic | ICD-10-CM

## 2018-06-20 DIAGNOSIS — R53.82 CHRONIC FATIGUE: ICD-10-CM

## 2018-06-20 PROCEDURE — 99213 OFFICE O/P EST LOW 20 MIN: CPT | Performed by: INTERNAL MEDICINE

## 2018-06-28 RX ORDER — DILTIAZEM HYDROCHLORIDE 180 MG/1
CAPSULE, COATED, EXTENDED RELEASE ORAL
Qty: 90 CAPSULE | Refills: 3 | Status: SHIPPED | OUTPATIENT
Start: 2018-06-28 | End: 2018-07-27 | Stop reason: SDUPTHER

## 2018-07-02 ENCOUNTER — TELEPHONE (OUTPATIENT)
Dept: FAMILY MEDICINE CLINIC | Age: 83
End: 2018-07-02

## 2018-07-27 ENCOUNTER — OFFICE VISIT (OUTPATIENT)
Dept: FAMILY MEDICINE CLINIC | Age: 83
End: 2018-07-27

## 2018-07-27 VITALS
OXYGEN SATURATION: 96 % | DIASTOLIC BLOOD PRESSURE: 64 MMHG | SYSTOLIC BLOOD PRESSURE: 124 MMHG | HEART RATE: 77 BPM | WEIGHT: 143.6 LBS | BODY MASS INDEX: 23.18 KG/M2

## 2018-07-27 DIAGNOSIS — E03.9 ACQUIRED HYPOTHYROIDISM: ICD-10-CM

## 2018-07-27 DIAGNOSIS — F51.04 PSYCHOPHYSIOLOGICAL INSOMNIA: ICD-10-CM

## 2018-07-27 DIAGNOSIS — R73.9 HYPERGLYCEMIA: ICD-10-CM

## 2018-07-27 DIAGNOSIS — F41.1 ANXIETY STATE: Primary | ICD-10-CM

## 2018-07-27 DIAGNOSIS — G60.3 IDIOPATHIC PROGRESSIVE NEUROPATHY: ICD-10-CM

## 2018-07-27 PROCEDURE — 99214 OFFICE O/P EST MOD 30 MIN: CPT | Performed by: FAMILY MEDICINE

## 2018-07-27 RX ORDER — LORAZEPAM 0.5 MG/1
0.5 TABLET ORAL 3 TIMES DAILY
Qty: 90 TABLET | Refills: 0 | Status: SHIPPED | OUTPATIENT
Start: 2018-07-27 | End: 2018-12-14 | Stop reason: SDUPTHER

## 2018-07-27 RX ORDER — MIRTAZAPINE 7.5 MG/1
7.5 TABLET, FILM COATED ORAL NIGHTLY
Qty: 30 TABLET | Refills: 3 | Status: SHIPPED | OUTPATIENT
Start: 2018-07-27 | End: 2018-11-02

## 2018-07-27 RX ORDER — LEVOTHYROXINE SODIUM 0.05 MG/1
50 TABLET ORAL DAILY
Qty: 90 TABLET | Refills: 3 | Status: SHIPPED | OUTPATIENT
Start: 2018-07-27 | End: 2019-08-01 | Stop reason: SDUPTHER

## 2018-07-27 ASSESSMENT — PATIENT HEALTH QUESTIONNAIRE - PHQ9
SUM OF ALL RESPONSES TO PHQ9 QUESTIONS 1 & 2: 2
2. FEELING DOWN, DEPRESSED OR HOPELESS: 1
SUM OF ALL RESPONSES TO PHQ QUESTIONS 1-9: 2
1. LITTLE INTEREST OR PLEASURE IN DOING THINGS: 1

## 2018-07-27 ASSESSMENT — ENCOUNTER SYMPTOMS
CONSTIPATION: 0
RHINORRHEA: 1
SHORTNESS OF BREATH: 0

## 2018-07-27 NOTE — PROGRESS NOTES
SUBJECTIVE:    Patient ID: Kelin Phillips is a 80 y.o. female. HPI Patient is here for a follow-up with a concern about both leg pain. Patient has numbness and tingling. Patient states that she cannot move her toes. Has been ongoing for three years and it is gradually worsening. Patient has also has problems with her bowel and her anxiety is gradually worsening. Patient remains very anxious. She had evaluation of her legs  3 years ago by Dr. Gill Moon, is found to have a severe peripheral neuropathy. This is thought to be idiopathic in nature. She had some weakness of the left foot at that time and was suggested to even wear an AFO,     OTC medications has been reviewed. Also has a fib      Hypertension:  Home blood pressure monitoring: No.  She is adherent to a low sodium diet. Patient denies chest pain and shortness of breath. Antihypertensive medication side effects: no medication side effects noted. Use of agents associated with hypertension: none. Sodium (mmol/L)   Date Value   03/15/2018 139    BUN (mg/dL)   Date Value   03/15/2018 18    Glucose (mg/dL)   Date Value   03/15/2018 105 (H)      Potassium (mmol/L)   Date Value   03/15/2018 4.1    CREATININE (mg/dL)   Date Value   03/15/2018 0.9         Has excessive worry and nervousness   takes lorazepam, prn   has peripheral neuropathy and uses cane   no falls, had EMG 2015   now cannot move left toes very well    Has history of a fib, paroxysmal  A fib   Holter last fall 14 % a fib  Gets short of breath with exertion    Current Outpatient Prescriptions on File Prior to Visit   Medication Sig Dispense Refill    LORazepam (ATIVAN) 0.5 MG tablet Take 0.5 mg by mouth every 6 hours as needed for Anxiety.       diltiazem (CARDIZEM CD) 180 MG extended release capsule TAKE 1 CAPSULE BY MOUTH DAILY 90 capsule 1    levothyroxine (SYNTHROID) 50 MCG tablet Take 1 tablet by mouth daily 90 tablet 3    aspirin 81 MG tablet

## 2018-09-27 ENCOUNTER — HOSPITAL ENCOUNTER (OUTPATIENT)
Age: 83
Discharge: HOME | End: 2018-09-27
Payer: MEDICARE

## 2018-09-27 DIAGNOSIS — R73.9 HYPERGLYCEMIA: ICD-10-CM

## 2018-09-27 DIAGNOSIS — E03.9 ACQUIRED HYPOTHYROIDISM: ICD-10-CM

## 2018-09-27 DIAGNOSIS — G60.3 IDIOPATHIC PROGRESSIVE NEUROPATHY: ICD-10-CM

## 2018-09-27 LAB
ANION GAP SERPL CALCULATED.3IONS-SCNC: 12 MMOL/L (ref 3–16)
BUN BLDV-MCNC: 17 MG/DL (ref 7–20)
CALCIUM SERPL-MCNC: 9.7 MG/DL (ref 8.3–10.6)
CHLORIDE BLD-SCNC: 104 MMOL/L (ref 99–110)
CO2: 24 MMOL/L (ref 21–32)
CREAT SERPL-MCNC: 1 MG/DL (ref 0.6–1.2)
FOLATE: >20 NG/ML (ref 4.78–24.2)
GFR AFRICAN AMERICAN: >60
GFR NON-AFRICAN AMERICAN: 52
GLUCOSE BLD-MCNC: 96 MG/DL (ref 70–99)
POTASSIUM SERPL-SCNC: 4.6 MMOL/L (ref 3.5–5.1)
SODIUM BLD-SCNC: 140 MMOL/L (ref 136–145)
TSH SERPL DL<=0.05 MIU/L-ACNC: 2.58 UIU/ML (ref 0.27–4.2)
VITAMIN B-12: 293 PG/ML (ref 211–911)

## 2018-09-27 PROCEDURE — 80048 BASIC METABOLIC PNL TOTAL CA: CPT

## 2018-09-27 PROCEDURE — 82607 VITAMIN B-12: CPT

## 2018-09-27 PROCEDURE — 84443 ASSAY THYROID STIM HORMONE: CPT

## 2018-09-27 PROCEDURE — 82746 ASSAY OF FOLIC ACID SERUM: CPT

## 2018-09-27 PROCEDURE — 83036 HEMOGLOBIN GLYCOSYLATED A1C: CPT

## 2018-09-28 LAB
ESTIMATED AVERAGE GLUCOSE: 108.3 MG/DL
HBA1C MFR BLD: 5.4 %

## 2018-11-02 ENCOUNTER — OFFICE VISIT (OUTPATIENT)
Dept: FAMILY MEDICINE CLINIC | Age: 83
End: 2018-11-02
Payer: MEDICARE

## 2018-11-02 VITALS
OXYGEN SATURATION: 98 % | WEIGHT: 145 LBS | HEART RATE: 68 BPM | SYSTOLIC BLOOD PRESSURE: 140 MMHG | DIASTOLIC BLOOD PRESSURE: 64 MMHG | BODY MASS INDEX: 23.4 KG/M2

## 2018-11-02 DIAGNOSIS — I10 ESSENTIAL HYPERTENSION: Chronic | ICD-10-CM

## 2018-11-02 DIAGNOSIS — R53.82 CHRONIC FATIGUE: Chronic | ICD-10-CM

## 2018-11-02 DIAGNOSIS — G60.3 IDIOPATHIC PROGRESSIVE NEUROPATHY: ICD-10-CM

## 2018-11-02 DIAGNOSIS — I48.0 PAROXYSMAL ATRIAL FIBRILLATION (HCC): Primary | Chronic | ICD-10-CM

## 2018-11-02 PROCEDURE — 99214 OFFICE O/P EST MOD 30 MIN: CPT | Performed by: FAMILY MEDICINE

## 2018-11-02 RX ORDER — LANOLIN ALCOHOL/MO/W.PET/CERES
2000 CREAM (GRAM) TOPICAL DAILY
COMMUNITY

## 2018-11-02 RX ORDER — DILTIAZEM HYDROCHLORIDE 180 MG/1
CAPSULE, COATED, EXTENDED RELEASE ORAL
Qty: 90 CAPSULE | Refills: 3 | Status: SHIPPED | OUTPATIENT
Start: 2018-11-02 | End: 2019-03-26 | Stop reason: SINTOL

## 2018-11-02 ASSESSMENT — PATIENT HEALTH QUESTIONNAIRE - PHQ9
SUM OF ALL RESPONSES TO PHQ QUESTIONS 1-9: 1
SUM OF ALL RESPONSES TO PHQ9 QUESTIONS 1 & 2: 1
SUM OF ALL RESPONSES TO PHQ QUESTIONS 1-9: 1
2. FEELING DOWN, DEPRESSED OR HOPELESS: 1
1. LITTLE INTEREST OR PLEASURE IN DOING THINGS: 0

## 2018-11-02 ASSESSMENT — ENCOUNTER SYMPTOMS
CONSTIPATION: 1
SHORTNESS OF BREATH: 1

## 2018-11-02 NOTE — PROGRESS NOTES
No current facility-administered medications on file prior to visit. Review of Systems   Constitutional: Positive for fatigue. Respiratory: Positive for shortness of breath. Gastrointestinal: Positive for constipation. Neurological: Positive for dizziness and numbness. All other systems reviewed and are negative. Physical Exam   Constitutional: She is oriented to person, place, and time. She appears well-developed and well-nourished. HENT:   Right Ear: External ear normal.   Left Ear: External ear normal.   Mouth/Throat: Oropharynx is clear and moist.   Cardiovascular: Normal rate, regular rhythm and intact distal pulses. Occasional extrasystoles are present. Few varicosities LE   Pulmonary/Chest: Effort normal and breath sounds normal.   Abdominal: Soft. She exhibits no distension and no mass. There is no tenderness. There is no guarding. Musculoskeletal: She exhibits no edema. Lymphadenopathy:     She has no cervical adenopathy. Neurological: She is alert and oriented to person, place, and time. Psychiatric: Her mood appears anxious. She does not exhibit a depressed mood. Juju Harden was seen today for hypertension, hypothyroidism and insomnia. Diagnoses and all orders for this visit:    Paroxysmal atrial fibrillation (HCC)    Essential hypertension    Chronic fatigue    Idiopathic progressive neuropathy    Other orders  -     diltiazem (CARDIZEM CD) 180 MG extended release capsule; TAKE 1 CAPSULE BY MOUTH DAILY    Discussed with patient using the MiraLAX or prune juice in addition continue with Dulcolax suppository 3 times a week for constipation. Discussed the importance of staying on the diltiazem for rate control for when she goes into atrial fibrillation. She is to continue on the aspirin each day. Discussed the importance of patient using a walker in order to prevent falls at home.  I reviewed her laboratory study showing the TSH B12 was normal but the low side of normal so patient is supplementing now with oral B12 , asymmetric metabolic panel was normal. CBC was normal earlier this year. Patient is taking the rates the pain sparingly. [facility] report accessed and reviewed , she did not need a refill on her lorazepam today. She is to follow-up here in 3 months, counseling majority of the 25 minute visit.

## 2018-12-14 DIAGNOSIS — F41.1 ANXIETY STATE: ICD-10-CM

## 2018-12-14 RX ORDER — LORAZEPAM 0.5 MG/1
TABLET ORAL
Qty: 90 TABLET | Refills: 0 | Status: SHIPPED | OUTPATIENT
Start: 2018-12-14 | End: 2019-01-13

## 2018-12-27 ENCOUNTER — OFFICE VISIT (OUTPATIENT)
Dept: CARDIOLOGY CLINIC | Age: 83
End: 2018-12-27
Payer: MEDICARE

## 2018-12-27 VITALS
HEART RATE: 64 BPM | HEIGHT: 66 IN | SYSTOLIC BLOOD PRESSURE: 138 MMHG | BODY MASS INDEX: 23.11 KG/M2 | DIASTOLIC BLOOD PRESSURE: 70 MMHG | WEIGHT: 143.8 LBS

## 2018-12-27 DIAGNOSIS — I48.0 PAROXYSMAL ATRIAL FIBRILLATION (HCC): ICD-10-CM

## 2018-12-27 DIAGNOSIS — R53.82 CHRONIC FATIGUE: Primary | ICD-10-CM

## 2018-12-27 PROCEDURE — 99214 OFFICE O/P EST MOD 30 MIN: CPT | Performed by: INTERNAL MEDICINE

## 2018-12-27 NOTE — PROGRESS NOTES
[facility]   Cardiac Follow-up    Referring Provider:  Lloyd Martinez MD     Chief Complaint   Patient presents with    6 Month Follow-Up    Atrial Fibrillation    Hypertension      History of Present Illness: Mrs. Leatha Ho presents today in follow up for irregular heart beat; she was a former patient of Dr. Shelley Sheehan. She has a history of hypertension and anxiety. She was originally diagnosed with Atrial fib in November of 2011 but converted spontaneously without treatment. She presented to the ER in October 2012 with palpitations; ECG at that time showed sinus arrhythmia. She then wore a Holter monitor in 10/2012 which showed short burst of PAF and was started on Xarelto, but no longer on it because of h/o anemia. Event recorder 2014 showed PAC's and brief PAT. Holter thru [facility] in 2015 showed AF, amio was recommended. She takes Ativan as needed for her anxiety. Today, she states that overall she feels OK but continues to feel weak and fatigued. She states getting dressed in the morning takes a lot of energy. She does not feel palpitations, but her blood pressure machine alerts her to an irregular rhythm. She questions whether taking Diltiazem later in the day would help her fatigue. She admits to being overly anxious and continues on Ativan for this as needed (~ few times a week), but this does not make her feel more fatigued. Past Medical History:   has a past medical history of Anxiety; Atrial fibrillation ([address] Disorder of bone and cartilage, unspecified; Fatigue; Hypertension; Migraine, unspecified, without mention of intractable migraine without mention of status migrainosus; Other disorders of kidney and ureter; Peripheral neuropathy; Psychiatric problem; Risk for falls; Spinal stenosis; Thyroid disease; Unspecified glaucoma(365.9); and Unspecified hearing loss. Surgical History:   has a past surgical history that includes Colon surgery; Kidney surgery;  Ovary

## 2019-01-21 ENCOUNTER — OFFICE VISIT (OUTPATIENT)
Dept: FAMILY MEDICINE CLINIC | Age: 84
End: 2019-01-21
Payer: MEDICARE

## 2019-01-21 VITALS
SYSTOLIC BLOOD PRESSURE: 120 MMHG | BODY MASS INDEX: 23.57 KG/M2 | WEIGHT: 146 LBS | HEART RATE: 68 BPM | OXYGEN SATURATION: 98 % | DIASTOLIC BLOOD PRESSURE: 70 MMHG

## 2019-01-21 DIAGNOSIS — K59.00 CONSTIPATION, UNSPECIFIED CONSTIPATION TYPE: ICD-10-CM

## 2019-01-21 DIAGNOSIS — Z13.220 SCREENING FOR LIPID DISORDERS: ICD-10-CM

## 2019-01-21 DIAGNOSIS — G60.3 IDIOPATHIC PROGRESSIVE NEUROPATHY: ICD-10-CM

## 2019-01-21 DIAGNOSIS — R13.10 DYSPHAGIA, UNSPECIFIED TYPE: ICD-10-CM

## 2019-01-21 DIAGNOSIS — I10 ESSENTIAL HYPERTENSION: Primary | Chronic | ICD-10-CM

## 2019-01-21 DIAGNOSIS — E03.9 ACQUIRED HYPOTHYROIDISM: ICD-10-CM

## 2019-01-21 DIAGNOSIS — R53.82 CHRONIC FATIGUE: Chronic | ICD-10-CM

## 2019-01-21 DIAGNOSIS — T17.308A CHOKING, INITIAL ENCOUNTER: ICD-10-CM

## 2019-01-21 DIAGNOSIS — F41.1 ANXIETY STATE: ICD-10-CM

## 2019-01-21 PROCEDURE — 99214 OFFICE O/P EST MOD 30 MIN: CPT | Performed by: FAMILY MEDICINE

## 2019-01-21 ASSESSMENT — ENCOUNTER SYMPTOMS
CONSTIPATION: 1
CHOKING: 1

## 2019-01-23 ENCOUNTER — HOSPITAL ENCOUNTER (OUTPATIENT)
Dept: GENERAL RADIOLOGY | Age: 84
Discharge: HOME | End: 2019-01-23
Payer: MEDICARE

## 2019-01-23 ENCOUNTER — HOSPITAL ENCOUNTER (OUTPATIENT)
Dept: SPEECH THERAPY | Age: 84
Setting detail: THERAPIES SERIES
Discharge: HOME | End: 2019-01-23
Payer: MEDICARE

## 2019-01-23 DIAGNOSIS — R13.10 DYSPHAGIA, UNSPECIFIED TYPE: ICD-10-CM

## 2019-01-23 DIAGNOSIS — T17.308A CHOKING, INITIAL ENCOUNTER: ICD-10-CM

## 2019-01-23 PROCEDURE — 92526 ORAL FUNCTION THERAPY: CPT

## 2019-01-23 PROCEDURE — 92611 MOTION FLUOROSCOPY/SWALLOW: CPT

## 2019-03-26 ENCOUNTER — TELEPHONE (OUTPATIENT)
Dept: CARDIOLOGY CLINIC | Age: 84
End: 2019-03-26

## 2019-03-29 ENCOUNTER — OFFICE VISIT (OUTPATIENT)
Dept: FAMILY MEDICINE CLINIC | Age: 84
End: 2019-03-29
Payer: MEDICARE

## 2019-03-29 VITALS
SYSTOLIC BLOOD PRESSURE: 128 MMHG | OXYGEN SATURATION: 98 % | TEMPERATURE: 97.6 F | WEIGHT: 144 LBS | DIASTOLIC BLOOD PRESSURE: 62 MMHG | HEART RATE: 74 BPM | BODY MASS INDEX: 23.24 KG/M2

## 2019-03-29 DIAGNOSIS — J02.9 ACUTE PHARYNGITIS, UNSPECIFIED ETIOLOGY: Primary | ICD-10-CM

## 2019-03-29 LAB — S PYO AG THROAT QL: NORMAL

## 2019-03-29 PROCEDURE — G8510 SCR DEP NEG, NO PLAN REQD: HCPCS | Performed by: PHYSICIAN ASSISTANT

## 2019-03-29 PROCEDURE — 3288F FALL RISK ASSESSMENT DOCD: CPT | Performed by: PHYSICIAN ASSISTANT

## 2019-03-29 PROCEDURE — 87880 STREP A ASSAY W/OPTIC: CPT | Performed by: PHYSICIAN ASSISTANT

## 2019-03-29 PROCEDURE — 99213 OFFICE O/P EST LOW 20 MIN: CPT | Performed by: PHYSICIAN ASSISTANT

## 2019-03-29 ASSESSMENT — ENCOUNTER SYMPTOMS
DIARRHEA: 0
TROUBLE SWALLOWING: 1
COUGH: 0
WHEEZING: 0
NAUSEA: 0
EYE PAIN: 0
VOMITING: 0
SORE THROAT: 1

## 2019-03-29 ASSESSMENT — PATIENT HEALTH QUESTIONNAIRE - PHQ9
2. FEELING DOWN, DEPRESSED OR HOPELESS: 0
SUM OF ALL RESPONSES TO PHQ QUESTIONS 1-9: 0
1. LITTLE INTEREST OR PLEASURE IN DOING THINGS: 0
SUM OF ALL RESPONSES TO PHQ9 QUESTIONS 1 & 2: 0
SUM OF ALL RESPONSES TO PHQ QUESTIONS 1-9: 0

## 2019-04-01 ENCOUNTER — OFFICE VISIT (OUTPATIENT)
Dept: FAMILY MEDICINE CLINIC | Age: 84
End: 2019-04-01
Payer: MEDICARE

## 2019-04-01 VITALS
BODY MASS INDEX: 23.08 KG/M2 | HEART RATE: 67 BPM | SYSTOLIC BLOOD PRESSURE: 120 MMHG | OXYGEN SATURATION: 97 % | DIASTOLIC BLOOD PRESSURE: 70 MMHG | WEIGHT: 143 LBS

## 2019-04-01 DIAGNOSIS — G60.9 IDIOPATHIC PERIPHERAL NEUROPATHY: Primary | ICD-10-CM

## 2019-04-01 PROCEDURE — 99213 OFFICE O/P EST LOW 20 MIN: CPT | Performed by: FAMILY MEDICINE

## 2019-04-01 RX ORDER — DILTIAZEM HYDROCHLORIDE 180 MG/1
180 CAPSULE, EXTENDED RELEASE ORAL DAILY
COMMUNITY
End: 2019-06-04 | Stop reason: ALTCHOICE

## 2019-04-01 RX ORDER — LORAZEPAM 0.5 MG/1
0.5 TABLET ORAL 3 TIMES DAILY
COMMUNITY
End: 2019-06-13 | Stop reason: SDUPTHER

## 2019-04-01 ASSESSMENT — ENCOUNTER SYMPTOMS
CONSTIPATION: 1
BACK PAIN: 1
SHORTNESS OF BREATH: 0
WHEEZING: 0
CHEST TIGHTNESS: 0

## 2019-04-01 NOTE — PROGRESS NOTES
SUBJECTIVE:    Cherri Canales is a 80 y.o. female who presents for a follow up visit. Chief Complaint   Patient presents with    Leg Pain     Patient c/o neuropathy pain in both legs. Ongoing for years, but getting worse. Neurologic Problem   The patient's primary symptoms include a loss of balance and weakness. Primary symptoms comment: numbness in both lower extremities. This is a chronic problem. The current episode started more than 1 year ago. The neurological problem developed insidiously. The problem has been gradually worsening since onset. Associated symptoms include back pain. Pertinent negatives include no shortness of breath. Past treatments include nothing. Patient's medications, allergies, past medical,surgical, social and family histories were reviewed and updated as appropriate.      Past Medical History:   Diagnosis Date    Anxiety     Atrial fibrillation (HCC)     Disorder of bone and cartilage, unspecified     Fatigue     Hypertension     Migraine, unspecified, without mention of intractable migraine without mention of status migrainosus     Other disorders of kidney and ureter     Peripheral neuropathy 4/17/2015    Psychiatric problem     Risk for falls 5/20/14    Spinal stenosis     Thyroid disease     Unspecified glaucoma(365.9)     Unspecified hearing loss      Past Surgical History:   Procedure Laterality Date    APPENDECTOMY      CATARACT REMOVAL Bilateral 04/2017    COLON SURGERY      EYE SURGERY      KIDNEY SURGERY      OVARY REMOVAL       Family History   Problem Relation Age of Onset    Heart Disease Mother     High Cholesterol Mother     High Blood Pressure Mother     Stroke Father     Heart Disease Father     Heart Disease Paternal Aunt      Social History     Socioeconomic History    Marital status:      Spouse name: Not on file    Number of children: Not on file    Years of education: Not on file    Highest education level: Not on file   Occupational History    Not on file   Social Needs    Financial resource strain: Not on file    Food insecurity:     Worry: Not on file     Inability: Not on file    Transportation needs:     Medical: Not on file     Non-medical: Not on file   Tobacco Use    Smoking status: Never Smoker    Smokeless tobacco: Never Used   Substance and Sexual Activity    Alcohol use: No     Alcohol/week: 0.0 oz    Drug use: No    Sexual activity: Not on file   Lifestyle    Physical activity:     Days per week: Not on file     Minutes per session: Not on file    Stress: Not on file   Relationships    Social connections:     Talks on phone: Not on file     Gets together: Not on file     Attends Hinduism service: Not on file     Active member of club or organization: Not on file     Attends meetings of clubs or organizations: Not on file     Relationship status: Not on file    Intimate partner violence:     Fear of current or ex partner: Not on file     Emotionally abused: Not on file     Physically abused: Not on file     Forced sexual activity: Not on file   Other Topics Concern    Not on file   Social History Narrative    Not on file     Allergies   Allergen Reactions    Zoloft [Sertraline Hcl]      Shaking and more anxiety    Sertraline Anxiety     Shaking and more anxiety     Current Outpatient Medications   Medication Sig Dispense Refill    diltiazem (DILACOR XR) 180 MG extended release capsule Take 180 mg by mouth daily      LORazepam (ATIVAN) 0.5 MG tablet Take 0.5 mg by mouth 3 times daily.  vitamin B-12 (CYANOCOBALAMIN) 1000 MCG tablet Take 2,000 mcg by mouth daily      levothyroxine (SYNTHROID) 50 MCG tablet Take 1 tablet by mouth daily 90 tablet 3    aspirin 81 MG tablet Take 81 mg by mouth daily       Multiple Vitamins-Minerals (THERAPEUTIC MULTIVITAMIN-MINERALS) tablet Take 1 tablet by mouth every other day       No current facility-administered medications for this visit. Review of Systems   HENT: Negative. Respiratory: Negative for chest tightness, shortness of breath and wheezing. Cardiovascular: Negative for leg swelling. Gastrointestinal: Positive for constipation. Genitourinary: Negative for difficulty urinating. Musculoskeletal: Positive for arthralgias and back pain. Neurological: Positive for weakness, numbness and loss of balance. Psychiatric/Behavioral: Negative for dysphoric mood. The patient is nervous/anxious. OBJECTIVE:    /70   Pulse 67   Wt 143 lb (64.9 kg)   SpO2 97%   BMI 23.08 kg/m²    Physical Exam   Constitutional: She is oriented to person, place, and time. She appears well-developed and well-nourished. HENT:   Head: Normocephalic and atraumatic. Right Ear: External ear normal.   Left Ear: External ear normal.   Nose: Nose normal.   Mouth/Throat: Oropharynx is clear and moist.   Eyes: Conjunctivae and EOM are normal. Right eye exhibits no discharge. Neck: Normal range of motion. Neck supple. No JVD present. No tracheal deviation present. No thyromegaly present. Cardiovascular: Normal rate and normal heart sounds. An irregularly irregular rhythm present. Pulmonary/Chest: Effort normal and breath sounds normal. No respiratory distress. She has no rales. Lymphadenopathy:     She has no cervical adenopathy. Neurological: She is alert and oriented to person, place, and time. Skin: Skin is warm and dry. Psychiatric: She has a normal mood and affect. ASSESSMENT/PLAN:    Garima Funk was seen today for leg pain. Diagnoses and all orders for this visit:    Idiopathic peripheral neuropathy  -     Cynthia Marino MD, Neurology, [facility]        Return for regularly scheduled follow-up. Please note portions of this note were completed with a voicerecognition program.  Efforts were made to edit the dictations but occasionally words are mis-transcribed.

## 2019-05-20 ENCOUNTER — HOSPITAL ENCOUNTER (OUTPATIENT)
Age: 84
Discharge: HOME | End: 2019-05-20
Payer: MEDICARE

## 2019-05-20 DIAGNOSIS — I10 ESSENTIAL HYPERTENSION: Chronic | ICD-10-CM

## 2019-05-20 DIAGNOSIS — Z13.220 SCREENING FOR LIPID DISORDERS: ICD-10-CM

## 2019-05-20 DIAGNOSIS — E03.9 ACQUIRED HYPOTHYROIDISM: ICD-10-CM

## 2019-05-20 LAB
A/G RATIO: 1.1 (ref 1.1–2.2)
ALBUMIN SERPL-MCNC: 3.7 G/DL (ref 3.4–5)
ALP BLD-CCNC: 124 U/L (ref 40–129)
ALT SERPL-CCNC: 13 U/L (ref 10–40)
ANION GAP SERPL CALCULATED.3IONS-SCNC: 13 MMOL/L (ref 3–16)
AST SERPL-CCNC: 14 U/L (ref 15–37)
BASOPHILS ABSOLUTE: 0 K/UL (ref 0–0.2)
BASOPHILS RELATIVE PERCENT: 0.6 %
BILIRUB SERPL-MCNC: 0.5 MG/DL (ref 0–1)
BUN BLDV-MCNC: 21 MG/DL (ref 7–20)
CALCIUM SERPL-MCNC: 9.3 MG/DL (ref 8.3–10.6)
CHLORIDE BLD-SCNC: 105 MMOL/L (ref 99–110)
CHOLESTEROL, FASTING: 117 MG/DL (ref 0–199)
CO2: 23 MMOL/L (ref 21–32)
CREAT SERPL-MCNC: 1.3 MG/DL (ref 0.6–1.2)
EOSINOPHILS ABSOLUTE: 0 K/UL (ref 0–0.6)
EOSINOPHILS RELATIVE PERCENT: 0.6 %
GFR AFRICAN AMERICAN: 46
GFR NON-AFRICAN AMERICAN: 38
GLOBULIN: 3.3 G/DL
GLUCOSE FASTING: 101 MG/DL (ref 70–99)
HCT VFR BLD CALC: 37.7 % (ref 36–48)
HDLC SERPL-MCNC: 50 MG/DL (ref 40–60)
HEMOGLOBIN: 12.7 G/DL (ref 12–16)
LDL CHOLESTEROL CALCULATED: 48 MG/DL
LYMPHOCYTES ABSOLUTE: 1 K/UL (ref 1–5.1)
LYMPHOCYTES RELATIVE PERCENT: 27.9 %
MCH RBC QN AUTO: 32.2 PG (ref 26–34)
MCHC RBC AUTO-ENTMCNC: 33.8 G/DL (ref 31–36)
MCV RBC AUTO: 95.2 FL (ref 80–100)
MONOCYTES ABSOLUTE: 0.3 K/UL (ref 0–1.3)
MONOCYTES RELATIVE PERCENT: 9.1 %
NEUTROPHILS ABSOLUTE: 2.3 K/UL (ref 1.7–7.7)
NEUTROPHILS RELATIVE PERCENT: 61.8 %
PDW BLD-RTO: 13.1 % (ref 12.4–15.4)
PLATELET # BLD: 189 K/UL (ref 135–450)
PMV BLD AUTO: 8.4 FL (ref 5–10.5)
POTASSIUM SERPL-SCNC: 4.2 MMOL/L (ref 3.5–5.1)
RBC # BLD: 3.96 M/UL (ref 4–5.2)
SODIUM BLD-SCNC: 141 MMOL/L (ref 136–145)
TOTAL PROTEIN: 7 G/DL (ref 6.4–8.2)
TRIGLYCERIDE, FASTING: 94 MG/DL (ref 0–150)
TSH REFLEX: 2.63 UIU/ML (ref 0.27–4.2)
VLDLC SERPL CALC-MCNC: 19 MG/DL
WBC # BLD: 3.7 K/UL (ref 4–11)

## 2019-05-20 PROCEDURE — 85025 COMPLETE CBC W/AUTO DIFF WBC: CPT

## 2019-05-20 PROCEDURE — 36415 COLL VENOUS BLD VENIPUNCTURE: CPT

## 2019-05-20 PROCEDURE — 80053 COMPREHEN METABOLIC PANEL: CPT

## 2019-05-20 PROCEDURE — 84443 ASSAY THYROID STIM HORMONE: CPT

## 2019-05-20 PROCEDURE — 80061 LIPID PANEL: CPT

## 2019-05-22 ENCOUNTER — OFFICE VISIT (OUTPATIENT)
Dept: FAMILY MEDICINE CLINIC | Age: 84
End: 2019-05-22
Payer: MEDICARE

## 2019-05-22 VITALS
BODY MASS INDEX: 23.4 KG/M2 | WEIGHT: 145 LBS | DIASTOLIC BLOOD PRESSURE: 70 MMHG | SYSTOLIC BLOOD PRESSURE: 130 MMHG | OXYGEN SATURATION: 98 % | HEART RATE: 68 BPM

## 2019-05-22 DIAGNOSIS — E53.8 B12 DEFICIENCY: ICD-10-CM

## 2019-05-22 DIAGNOSIS — I48.0 PAROXYSMAL ATRIAL FIBRILLATION (HCC): Chronic | ICD-10-CM

## 2019-05-22 DIAGNOSIS — I10 ESSENTIAL HYPERTENSION: Primary | Chronic | ICD-10-CM

## 2019-05-22 DIAGNOSIS — R53.82 CHRONIC FATIGUE: ICD-10-CM

## 2019-05-22 DIAGNOSIS — M17.9 OSTEOARTHRITIS OF KNEE, UNSPECIFIED LATERALITY, UNSPECIFIED OSTEOARTHRITIS TYPE: ICD-10-CM

## 2019-05-22 DIAGNOSIS — Z13.220 SCREENING FOR LIPID DISORDERS: ICD-10-CM

## 2019-05-22 DIAGNOSIS — E03.9 ACQUIRED HYPOTHYROIDISM: ICD-10-CM

## 2019-05-22 PROCEDURE — 99214 OFFICE O/P EST MOD 30 MIN: CPT | Performed by: FAMILY MEDICINE

## 2019-05-22 ASSESSMENT — ENCOUNTER SYMPTOMS
CHEST TIGHTNESS: 0
DIARRHEA: 0
CONSTIPATION: 1
RHINORRHEA: 1
SHORTNESS OF BREATH: 1

## 2019-05-22 NOTE — PROGRESS NOTES
SUBJECTIVE:    Brenda Belle is a 80 y.o. female who presents for a follow up visit. Chief Complaint   Patient presents with    Follow-up     Patient is here for a routine follow up. B/P readings @ home have been high the past month. Hypertension   This is a chronic problem. The current episode started more than 1 year ago. The problem has been waxing and waning since onset. Condition status: Patient states that her home readings are higher at today's visit reveals a normal blood pressure 130/70. Associated symptoms include headaches and shortness of breath. There are no associated agents to hypertension. Risk factors for coronary artery disease include stress and sedentary lifestyle. Past treatments include calcium channel blockers. The current treatment provides significant improvement. Compliance problems include exercise and diet. Hypertensive end-organ damage includes kidney disease. Leg Pain    There was no injury mechanism (Chronic condition). The pain is present in the left leg and right leg. The quality of the pain is described as aching and burning (numb and tingling). The pain is moderate. The pain has been constant since onset. Associated symptoms include muscle weakness, numbness and tingling. The symptoms are aggravated by movement and weight bearing. She has tried rest for the symptoms. Anxiety  Some associated insomnia. Taking Lorazepam.  Still more anxious recently due to pill collection company calling her even though she did not follow through on a loan for neuropathy treatment. Neuropathy  She continues to complain of numbness and pain in her lower extremities.   She has been referred to neurology and will be seeing Dr. Major Diaz soon  Weakness in legs  She feels that this is progressing and she is concerned about the strength in   Lab review  Patient's CBC revealed white blood cell count of 3.7 her RBC was low at 3.96 glucose normal at 12.7 hematocrit 37.7 platelet count 189.  Fasting glucose was 101. She does have mild renal insufficiency with an estimated GFR of 38 this is due to creatinine of 1.3 at 80years of age. CMP was essentially normal last role was 117, triglycerides 94, HDL 50, LDL 48  TSH was normal at 2.63. Patient's medications, allergies, past medical,surgical, social and family histories were reviewed and updated as appropriate.      Past Medical History:   Diagnosis Date    Anxiety     Atrial fibrillation (HCC)     Disorder of bone and cartilage, unspecified     Fatigue     Hypertension     Migraine, unspecified, without mention of intractable migraine without mention of status migrainosus     Other disorders of kidney and ureter     Peripheral neuropathy 4/17/2015    Psychiatric problem     Risk for falls 5/20/14    Spinal stenosis     Thyroid disease     Unspecified glaucoma(365.9)     Unspecified hearing loss      Past Surgical History:   Procedure Laterality Date    APPENDECTOMY      CATARACT REMOVAL Bilateral 04/2017    COLON SURGERY      EYE SURGERY      KIDNEY SURGERY      OVARY REMOVAL       Family History   Problem Relation Age of Onset    Heart Disease Mother     High Cholesterol Mother     High Blood Pressure Mother     Stroke Father     Heart Disease Father     Heart Disease Paternal Aunt      Social History     Socioeconomic History    Marital status:      Spouse name: Not on file    Number of children: Not on file    Years of education: Not on file    Highest education level: Not on file   Occupational History    Not on file   Social Needs    Financial resource strain: Not on file    Food insecurity:     Worry: Not on file     Inability: Not on file    Transportation needs:     Medical: Not on file     Non-medical: Not on file   Tobacco Use    Smoking status: Never Smoker    Smokeless tobacco: Never Used   Substance and Sexual Activity    Alcohol use: No     Alcohol/week: 0.0 oz    Drug use: No    Sexual activity: Not on file   Lifestyle    Physical activity:     Days per week: Not on file     Minutes per session: Not on file    Stress: Not on file   Relationships    Social connections:     Talks on phone: Not on file     Gets together: Not on file     Attends [employer] service: Not on file     Active member of club or organization: Not on file     Attends meetings of clubs or organizations: Not on file     Relationship status: Not on file    Intimate partner violence:     Fear of current or ex partner: Not on file     Emotionally abused: Not on file     Physically abused: Not on file     Forced sexual activity: Not on file   Other Topics Concern    Not on file   Social History Narrative    Not on file     Allergies   Allergen Reactions    Zoloft [Sertraline Hcl]      Shaking and more anxiety    Sertraline Anxiety     Shaking and more anxiety     Current Outpatient Medications   Medication Sig Dispense Refill    diltiazem (DILACOR XR) 180 MG extended release capsule Take 180 mg by mouth daily      LORazepam (ATIVAN) 0.5 MG tablet Take 0.5 mg by mouth 3 times daily.  vitamin B-12 (CYANOCOBALAMIN) 1000 MCG tablet Take 2,000 mcg by mouth daily      levothyroxine (SYNTHROID) 50 MCG tablet Take 1 tablet by mouth daily 90 tablet 3    aspirin 81 MG tablet Take 81 mg by mouth daily       Multiple Vitamins-Minerals (THERAPEUTIC MULTIVITAMIN-MINERALS) tablet Take 1 tablet by mouth every other day       No current facility-administered medications for this visit. Review of Systems   Constitutional: Positive for fatigue and unexpected weight change (lost 8 lbs over 9 months). Negative for activity change. HENT: Positive for postnasal drip and rhinorrhea. Respiratory: Positive for shortness of breath. Negative for chest tightness. Gastrointestinal: Positive for constipation. Negative for diarrhea. Genitourinary: Negative for difficulty urinating.    Neurological: Positive for dizziness (when BP increased), tingling, numbness and headaches. Psychiatric/Behavioral: The patient is nervous/anxious. OBJECTIVE:    /70   Pulse 68   Wt 145 lb (65.8 kg)   SpO2 98%   BMI 23.40 kg/m²    Physical Exam   Constitutional: She is oriented to person, place, and time. She appears well-developed and well-nourished. HENT:   Head: Normocephalic and atraumatic. Right Ear: External ear normal.   Left Ear: External ear normal.   Nose: Nose normal.   Mouth/Throat: Oropharynx is clear and moist.   Eyes: Conjunctivae and EOM are normal.   Neck: Normal range of motion. Neck supple. No JVD present. No tracheal deviation present. No thyromegaly present. Cardiovascular: Normal rate, regular rhythm and normal heart sounds. Pulmonary/Chest: Effort normal and breath sounds normal. No respiratory distress. She has no rales. Lymphadenopathy:     She has no cervical adenopathy. Neurological: She is alert and oriented to person, place, and time. Skin: Skin is warm and dry. Psychiatric: She has a normal mood and affect. ASSESSMENT/PLAN:    Yaakov Prasad was seen today for follow-up. Diagnoses and all orders for this visit:    Essential hypertension  Stable    Osteoarthritis of knee, unspecified laterality, unspecified osteoarthritis type    Acquired hypothyroidism  Stable on current medication     Chronic fatigue  Unchanged     Paroxysmal atrial fibrillation ([facility] [address]  Followed by cardiology     B12 deficiency  -     Vitamin B12 & Folate; Future    Screening for lipid disorders  -     Comprehensive Metabolic Panel, Fasting; Future  -     CBC Auto Differential; Future  -     Lipid, Fasting; Future    Patient states that she's had elevated blood pressures at home. I have asked her to start checking using her monitor and if they remain elevated bring her monitor and her log of readings to compare with our cuff here in the office.   I did reassure her that today's reading is normal.    Return in about 6 months (around 11/22/2019). Please note portions of this note were completed with a voicerecognition program.  Efforts were made to edit the dictations but occasionally words are mis-transcribed.

## 2019-05-23 ENCOUNTER — TELEPHONE (OUTPATIENT)
Dept: CARDIOLOGY CLINIC | Age: 84
End: 2019-05-23

## 2019-05-23 ENCOUNTER — NURSE TRIAGE (OUTPATIENT)
Dept: OTHER | Facility: CLINIC | Age: 84
End: 2019-05-23

## 2019-05-23 RX ORDER — LISINOPRIL 10 MG/1
10 TABLET ORAL 2 TIMES DAILY
Qty: 60 TABLET | Refills: 3 | Status: SHIPPED | OUTPATIENT
Start: 2019-05-23 | End: 2019-05-28

## 2019-05-23 NOTE — TELEPHONE ENCOUNTER
Reason for Disposition   Patient wants to be seen    Protocols used: HIGH BLOOD PRESSURE-ADULT-OH    Pt with report of high BP. Pt took BP earlier this morning - 195/100 and 189/100. Then she took diltiazem, and most recent /92. Pt has been having a slight headache, but otherwise no symptoms. Pt states she has been having high BPs over the last 1 or 2 months and it is scaring her. Caller with symptoms as documented above. Caller informed of disposition. Pt states she will consult with her cardiologist first and then perhaps call back to make apt. Care advice as documented.

## 2019-05-23 NOTE — TELEPHONE ENCOUNTER
Called patient lmom per  needs to take lisinopril 10 mg twice daily.  Sent prescription to [address] [place] [address]

## 2019-05-27 ENCOUNTER — APPOINTMENT (OUTPATIENT)
Dept: GENERAL RADIOLOGY | Age: 84
End: 2019-05-27
Payer: MEDICARE

## 2019-05-27 ENCOUNTER — HOSPITAL ENCOUNTER (EMERGENCY)
Age: 84
Discharge: HOME | End: 2019-05-28
Attending: EMERGENCY MEDICINE
Payer: MEDICARE

## 2019-05-27 DIAGNOSIS — R55 PRE-SYNCOPE: ICD-10-CM

## 2019-05-27 DIAGNOSIS — R53.1 WEAKNESS: Primary | ICD-10-CM

## 2019-05-27 DIAGNOSIS — Z87.898 HISTORY OF BRADYCARDIA: ICD-10-CM

## 2019-05-27 LAB
A/G RATIO: 1.3 (ref 1.1–2.2)
ALBUMIN SERPL-MCNC: 4.3 G/DL (ref 3.4–5)
ALP BLD-CCNC: 137 U/L (ref 40–129)
ALT SERPL-CCNC: 15 U/L (ref 10–40)
ANION GAP SERPL CALCULATED.3IONS-SCNC: 14 MMOL/L (ref 3–16)
APTT: 31.9 SEC (ref 26–36)
AST SERPL-CCNC: 18 U/L (ref 15–37)
BASOPHILS ABSOLUTE: 0 K/UL (ref 0–0.2)
BASOPHILS RELATIVE PERCENT: 0.7 %
BILIRUB SERPL-MCNC: 0.4 MG/DL (ref 0–1)
BILIRUBIN URINE: NEGATIVE
BLOOD, URINE: NEGATIVE
BUN BLDV-MCNC: 20 MG/DL (ref 7–20)
CALCIUM SERPL-MCNC: 9.7 MG/DL (ref 8.3–10.6)
CHLORIDE BLD-SCNC: 101 MMOL/L (ref 99–110)
CLARITY: CLEAR
CO2: 23 MMOL/L (ref 21–32)
COLOR: YELLOW
CREAT SERPL-MCNC: 1.1 MG/DL (ref 0.6–1.2)
EOSINOPHILS ABSOLUTE: 0 K/UL (ref 0–0.6)
EOSINOPHILS RELATIVE PERCENT: 0.6 %
EPITHELIAL CELLS, UA: 1 /HPF (ref 0–5)
GFR AFRICAN AMERICAN: 56
GFR NON-AFRICAN AMERICAN: 47
GLOBULIN: 3.3 G/DL
GLUCOSE BLD-MCNC: 118 MG/DL (ref 70–99)
GLUCOSE URINE: NEGATIVE MG/DL
HCT VFR BLD CALC: 40.8 % (ref 36–48)
HEMOGLOBIN: 14.1 G/DL (ref 12–16)
HYALINE CASTS: 1 /LPF (ref 0–8)
INR BLD: 0.95 (ref 0.86–1.14)
KETONES, URINE: NEGATIVE MG/DL
LEUKOCYTE ESTERASE, URINE: ABNORMAL
LYMPHOCYTES ABSOLUTE: 1.2 K/UL (ref 1–5.1)
LYMPHOCYTES RELATIVE PERCENT: 25.4 %
MCH RBC QN AUTO: 32.9 PG (ref 26–34)
MCHC RBC AUTO-ENTMCNC: 34.4 G/DL (ref 31–36)
MCV RBC AUTO: 95.7 FL (ref 80–100)
MICROSCOPIC EXAMINATION: YES
MONOCYTES ABSOLUTE: 0.4 K/UL (ref 0–1.3)
MONOCYTES RELATIVE PERCENT: 7.8 %
NEUTROPHILS ABSOLUTE: 3.1 K/UL (ref 1.7–7.7)
NEUTROPHILS RELATIVE PERCENT: 65.5 %
NITRITE, URINE: NEGATIVE
PDW BLD-RTO: 13.1 % (ref 12.4–15.4)
PH UA: 6 (ref 5–8)
PLATELET # BLD: 201 K/UL (ref 135–450)
PMV BLD AUTO: 8.3 FL (ref 5–10.5)
POTASSIUM SERPL-SCNC: 4.7 MMOL/L (ref 3.5–5.1)
PRO-BNP: 622 PG/ML (ref 0–449)
PROTEIN UA: NEGATIVE MG/DL
PROTHROMBIN TIME: 10.8 SEC (ref 9.8–13)
RBC # BLD: 4.27 M/UL (ref 4–5.2)
RBC UA: 1 /HPF (ref 0–4)
SODIUM BLD-SCNC: 138 MMOL/L (ref 136–145)
SPECIFIC GRAVITY UA: 1.01 (ref 1–1.03)
TOTAL PROTEIN: 7.6 G/DL (ref 6.4–8.2)
TROPONIN: <0.01 NG/ML
URINE REFLEX TO CULTURE: YES
URINE TYPE: ABNORMAL
UROBILINOGEN, URINE: 0.2 E.U./DL
WBC # BLD: 4.7 K/UL (ref 4–11)
WBC UA: 3 /HPF (ref 0–5)

## 2019-05-27 PROCEDURE — 85730 THROMBOPLASTIN TIME PARTIAL: CPT

## 2019-05-27 PROCEDURE — 80053 COMPREHEN METABOLIC PANEL: CPT

## 2019-05-27 PROCEDURE — 85610 PROTHROMBIN TIME: CPT

## 2019-05-27 PROCEDURE — 87086 URINE CULTURE/COLONY COUNT: CPT

## 2019-05-27 PROCEDURE — 81001 URINALYSIS AUTO W/SCOPE: CPT

## 2019-05-27 PROCEDURE — 83880 ASSAY OF NATRIURETIC PEPTIDE: CPT

## 2019-05-27 PROCEDURE — 71046 X-RAY EXAM CHEST 2 VIEWS: CPT

## 2019-05-27 PROCEDURE — 85025 COMPLETE CBC W/AUTO DIFF WBC: CPT

## 2019-05-27 PROCEDURE — 93005 ELECTROCARDIOGRAM TRACING: CPT | Performed by: EMERGENCY MEDICINE

## 2019-05-27 PROCEDURE — 84484 ASSAY OF TROPONIN QUANT: CPT

## 2019-05-27 PROCEDURE — 99284 EMERGENCY DEPT VISIT MOD MDM: CPT

## 2019-05-27 ASSESSMENT — ENCOUNTER SYMPTOMS
RHINORRHEA: 0
SHORTNESS OF BREATH: 0
DIARRHEA: 0
VOMITING: 0
NAUSEA: 0
COUGH: 0
ABDOMINAL PAIN: 0
WHEEZING: 0

## 2019-05-28 ENCOUNTER — OFFICE VISIT (OUTPATIENT)
Dept: FAMILY MEDICINE CLINIC | Age: 84
End: 2019-05-28
Payer: MEDICARE

## 2019-05-28 VITALS
BODY MASS INDEX: 22.76 KG/M2 | HEIGHT: 67 IN | RESPIRATION RATE: 22 BRPM | TEMPERATURE: 97.9 F | OXYGEN SATURATION: 98 % | WEIGHT: 145 LBS | HEART RATE: 73 BPM | SYSTOLIC BLOOD PRESSURE: 139 MMHG | DIASTOLIC BLOOD PRESSURE: 65 MMHG

## 2019-05-28 VITALS
BODY MASS INDEX: 22.24 KG/M2 | OXYGEN SATURATION: 97 % | HEART RATE: 66 BPM | SYSTOLIC BLOOD PRESSURE: 120 MMHG | DIASTOLIC BLOOD PRESSURE: 70 MMHG | WEIGHT: 142 LBS

## 2019-05-28 DIAGNOSIS — R53.82 CHRONIC FATIGUE: Chronic | ICD-10-CM

## 2019-05-28 DIAGNOSIS — F41.1 ANXIETY STATE: ICD-10-CM

## 2019-05-28 DIAGNOSIS — I10 ESSENTIAL HYPERTENSION: Primary | Chronic | ICD-10-CM

## 2019-05-28 LAB
EKG ATRIAL RATE: 156 BPM
EKG DIAGNOSIS: NORMAL
EKG Q-T INTERVAL: 386 MS
EKG QRS DURATION: 68 MS
EKG QTC CALCULATION (BAZETT): 416 MS
EKG R AXIS: -8 DEGREES
EKG T AXIS: 79 DEGREES
EKG VENTRICULAR RATE: 70 BPM

## 2019-05-28 PROCEDURE — 93010 ELECTROCARDIOGRAM REPORT: CPT | Performed by: INTERNAL MEDICINE

## 2019-05-28 PROCEDURE — 99213 OFFICE O/P EST LOW 20 MIN: CPT | Performed by: FAMILY MEDICINE

## 2019-05-28 ASSESSMENT — ENCOUNTER SYMPTOMS
CONSTIPATION: 0
CHEST TIGHTNESS: 0
SHORTNESS OF BREATH: 0
WHEEZING: 0
DIARRHEA: 0

## 2019-05-28 NOTE — ED PROVIDER NOTES
CREATININE 1.1 0.6 - 1.2 mg/dL    GFR Non- 47 (A) >60    GFR  56 (A) >60    Calcium 9.7 8.3 - 10.6 mg/dL    Total Protein 7.6 6.4 - 8.2 g/dL    Alb 4.3 3.4 - 5.0 g/dL    Albumin/Globulin Ratio 1.3 1.1 - 2.2    Total Bilirubin 0.4 0.0 - 1.0 mg/dL    Alkaline Phosphatase 137 (H) 40 - 129 U/L    ALT 15 10 - 40 U/L    AST 18 15 - 37 U/L    Globulin 3.3 g/dL   Troponin   Result Value Ref Range    Troponin <0.01 <0.01 ng/mL   APTT   Result Value Ref Range    aPTT 31.9 26.0 - 36.0 sec   Protime-INR   Result Value Ref Range    Protime 10.8 9.8 - 13.0 sec    INR 0.95 0.86 - 1.14   Brain Natriuretic Peptide   Result Value Ref Range    Pro- (H) 0 - 449 pg/mL   Urinalysis Reflex to Culture   Result Value Ref Range    Color, UA YELLOW Straw/Yellow    Clarity, UA Clear Clear    Glucose, Ur Negative Negative mg/dL    Bilirubin Urine Negative Negative    Ketones, Urine Negative Negative mg/dL    Specific Gravity, UA 1.007 1.005 - 1.030    Blood, Urine Negative Negative    pH, UA 6.0 5.0 - 8.0    Protein, UA Negative Negative mg/dL    Urobilinogen, Urine 0.2 <2.0 E.U./dL    Nitrite, Urine Negative Negative    Leukocyte Esterase, Urine SMALL (A) Negative    Microscopic Examination YES     Urine Reflex to Culture Yes     Urine Type Not Specified    Microscopic Urinalysis   Result Value Ref Range    Hyaline Casts, UA 1 0 - 8 /LPF    WBC, UA 3 0 - 5 /HPF    RBC, UA 1 0 - 4 /HPF    Epi Cells 1 0 - 5 /HPF   EKG 12 Lead   Result Value Ref Range    Ventricular Rate 70 BPM    Atrial Rate 156 BPM    QRS Duration 68 ms    Q-T Interval 386 ms    QTc Calculation (Bazett) 416 ms    R Axis -8 degrees    T Axis 79 degrees    Diagnosis       Atrial fibrillationAnteroseptal infarct , age undetermined     Xr Chest Standard (2 Vw)    Result Date: 5/27/2019  EXAMINATION: TWO XRAY VIEWS OF THE CHEST 5/27/2019 9:47 pm COMPARISON: 09/20/2015 HISTORY: ORDERING SYSTEM PROVIDED HISTORY: Chest Discomfort TECHNOLOGIST PROVIDED HISTORY: Reason for exam:->Chest Discomfort Ordering Physician Provided Reason for Exam: Chest Discomfort Acuity: Unknown Type of Exam: Unknown FINDINGS: Cardiac leads project over the chest.  Linear opacity is demonstrated at the left base. No pleural effusion. No pneumothorax. Cardiac and mediastinal silhouettes are unchanged. Calcification of aorta. Thickening of fissure on the lateral view. Left basilar atelectasis or pneumonitis though the former is favored given linear morphology.         Ana Martinez MD  05/28/19 [id number]

## 2019-05-28 NOTE — ED NOTES
Patient walked to the bathroom. Urine sample collected and sent to the lab.       Edmundo Miller RN  05/27/19 0923

## 2019-05-28 NOTE — PROGRESS NOTES
SUBJECTIVE:    Earl Prescott is a 80 y.o. female who presents for a follow up visit. Chief Complaint   Patient presents with   [facility] ED Follow-up     Patient is here for an E/R follow up- F last night. Heart rate was very low, felt very weak. Still feeling bad this morning. Hypertension   This is a chronic problem. The current episode started more than 1 year ago. The problem has been waxing and waning since onset. The problem is controlled. Pertinent negatives include no chest pain, palpitations or shortness of breath. Past treatments include calcium channel blockers. The current treatment provides significant improvement. Compliance problems include exercise. Anxiety   Patient has a long-standing problem of anxiety. She states she's been tried on various SSRIs without success. The only one she listed as an allergy as Zoloft. She has a prescription for Ativan 0.5 mg to be used 3 times daily as needed. She is taking it maybe twice a week. She is under a lot of stress. Her  has dementia. She is recently been hassled by a  thinks that this is getting under control. Her blood pressure has been going up and time she is monitoring at home. She had called her cardiologist would ask her to take lisinopril 10 mg twice daily due to elevated blood pressure. She did not take her lisinopril and all. She had contemplated stopping her diltiazem but in the end did not stop it. Today just taking her diltiazem 180 her blood pressure is normal.  She was in the emergency room yesterday with what is most likely anxiety related symptoms. Blood pressure was monitored and there were some higher readings. Overall they appeared fairly normal.  She states she did not take an Ativan last night and did not sleep very well but she had asked the ER doctor if she should take one and he said no.           Patient's medications, allergies, past medical,surgical, social and family histories were file     Attends meetings of clubs or organizations: Not on file     Relationship status: Not on file    Intimate partner violence:     Fear of current or ex partner: Not on file     Emotionally abused: Not on file     Physically abused: Not on file     Forced sexual activity: Not on file   Other Topics Concern    Not on file   Social History Narrative    Not on file     Allergies   Allergen Reactions    Zoloft [Sertraline Hcl]      Shaking and more anxiety    Sertraline Anxiety     Shaking and more anxiety     Current Outpatient Medications   Medication Sig Dispense Refill    diltiazem (DILACOR XR) 180 MG extended release capsule Take 180 mg by mouth daily      LORazepam (ATIVAN) 0.5 MG tablet Take 0.5 mg by mouth 3 times daily.  vitamin B-12 (CYANOCOBALAMIN) 1000 MCG tablet Take 2,000 mcg by mouth daily      levothyroxine (SYNTHROID) 50 MCG tablet Take 1 tablet by mouth daily 90 tablet 3    aspirin 81 MG tablet Take 81 mg by mouth daily       Multiple Vitamins-Minerals (THERAPEUTIC MULTIVITAMIN-MINERALS) tablet Take 1 tablet by mouth every other day       No current facility-administered medications for this visit. Review of Systems   Constitutional: Negative for activity change, fatigue and unexpected weight change. HENT: Negative. Respiratory: Negative for chest tightness, shortness of breath and wheezing. Cardiovascular: Negative for chest pain and palpitations. Gastrointestinal: Negative for constipation and diarrhea. Genitourinary: Negative for difficulty urinating. Neurological: Positive for dizziness and light-headedness (last pm). Psychiatric/Behavioral: The patient is nervous/anxious. OBJECTIVE:    /70   Pulse 66   Wt 142 lb (64.4 kg)   SpO2 97%   BMI 22.24 kg/m²    Physical Exam   Constitutional: She is oriented to person, place, and time. She appears well-developed and well-nourished. HENT:   Head: Normocephalic and atraumatic.    Right Ear: External ear normal.   Left Ear: External ear normal.   Nose: Nose normal.   Mouth/Throat: Oropharynx is clear and moist.   Eyes: Conjunctivae and EOM are normal. Right eye exhibits no discharge. Neck: Normal range of motion. Neck supple. No JVD present. No tracheal deviation present. No thyromegaly present. Cardiovascular: Normal rate and normal heart sounds. An irregularly irregular rhythm present. Pulmonary/Chest: Effort normal and breath sounds normal. No respiratory distress. She has no rales. Lymphadenopathy:     She has no cervical adenopathy. Neurological: She is alert and oriented to person, place, and time. Skin: Skin is warm and dry. Psychiatric: She has a normal mood and affect. ASSESSMENT/PLAN:    Deysi Martinez was seen today for ed follow-up. Diagnoses and all orders for this visit:    Essential hypertension  Stable    Chronic fatigue  Possibly multifactorial in etiology including stress and poor sleep. Anxiety state  In need of better control. Since he did not do well with SSRI medication I encouraged her to continue to take her Ativan on a little more frequent basis. Return for regularly scheduled follow-up. Please note portions of this note were completed with a voicerecognition program.  Efforts were made to edit the dictations but occasionally words are mis-transcribed.

## 2019-05-28 NOTE — ED PROVIDER NOTES
[address] [facility]        Pt Name: Ryan Parikh  MRN: [id number]  [place] 4/17/1928  Date of evaluation: 5/27/2019  Provider: Bobby Parker PA-C  PCP: Blaine Denney MD    This patient was seen and evaluated by the attending physician Dr. Romain Reeder. CHIEF COMPLAINT       Chief Complaint   Patient presents with    Bradycardia     Pt states that around 6p, she felt her heartrate was down into the 40's and felt her heart was going to stop. Presently 70bpm. Was also concerned for hypotension but is 155/79 at triage. HISTORY OF PRESENT ILLNESS   (Location/Symptom, Timing/Onset, Context/Setting, Quality, Duration, Modifying Factors, Severity)  Note limiting factors. Ryan Parikh is a 80 y.o. female who presents for evaluation of generalized weakness and fatigue. Concern for bradycardia and hypotension. Patient states that she started feeling poorly around 6 p.m. She states that she took her blood pressure and heart rate, and that her heart rate was 41, and blood pressure was 101/??. . She states that she sat down to relax and rechecked it and it seemed to be getting a little bit better but still wasn't feeling well and was scared, calling 911 to be evaluated. [employer] squad arrived, her heart rate was in the 60s and blood pressure was within normal limits. She states that she still feels a little off, but denies dizziness/lightheadedness, visual disturbances or syncope. No chest pain or shortness of breath. No abdominal pain, nausea or vomiting. No recent illness, cough, congestion or runny nose. No fevers or chills. She denies history of similar symptoms. Of note, she does have history of atrial fibrillation and takes Cardizem daily. She reports medication compliance. She has no other complaints or concerns at this time.      Nursing Notes were all reviewed and agreed with or any disagreements were addressed  in the HPI.    REVIEW OF SYSTEMS    (2-9 systems for level 4, 10 or more for level 5)     Review of Systems   Constitutional: Positive for fatigue. Negative for appetite change, chills and fever. HENT: Negative for congestion and rhinorrhea. Eyes: Negative for visual disturbance. Respiratory: Negative for cough, shortness of breath and wheezing. Cardiovascular: Negative for chest pain. Gastrointestinal: Negative for abdominal pain, diarrhea, nausea and vomiting. Genitourinary: Negative for difficulty urinating, dysuria and hematuria. Musculoskeletal: Negative for neck pain and neck stiffness. Skin: Negative for rash. Neurological: Positive for weakness. Negative for dizziness, syncope, light-headedness and headaches. Psychiatric/Behavioral: The patient is nervous/anxious. Positives and Pertinent negatives as per HPI. Except as noted abovein the ROS, all other systems were reviewed and negative.        PAST MEDICAL HISTORY     Past Medical History:   Diagnosis Date    Anxiety     Atrial fibrillation (HCC)     Disorder of bone and cartilage, unspecified     Fatigue     Hypertension     Migraine, unspecified, without mention of intractable migraine without mention of status migrainosus     Other disorders of kidney and ureter     Peripheral neuropathy 4/17/2015    Psychiatric problem     Risk for falls 5/20/14    Spinal stenosis     Thyroid disease     Unspecified glaucoma(365.9)     Unspecified hearing loss          SURGICAL HISTORY     Past Surgical History:   Procedure Laterality Date    APPENDECTOMY      CATARACT REMOVAL Bilateral 04/2017    COLON SURGERY      EYE SURGERY      KIDNEY SURGERY      OVARY REMOVAL           CURRENTMEDICATIONS       Previous Medications    ASPIRIN 81 MG TABLET    Take 81 mg by mouth daily     DILTIAZEM (DILACOR XR) 180 MG EXTENDED RELEASE CAPSULE    Take 180 mg by mouth daily    LEVOTHYROXINE (SYNTHROID) 50 MCG TABLET    Take 1 tablet by mouth daily    LISINOPRIL (PRINIVIL;ZESTRIL) 10 MG TABLET    Take 1 tablet by mouth 2 times daily    LORAZEPAM (ATIVAN) 0.5 MG TABLET    Take 0.5 mg by mouth 3 times daily.      MULTIPLE VITAMINS-MINERALS (THERAPEUTIC MULTIVITAMIN-MINERALS) TABLET    Take 1 tablet by mouth every other day    VITAMIN B-12 (CYANOCOBALAMIN) 1000 MCG TABLET    Take 2,000 mcg by mouth daily         ALLERGIES     Zoloft [sertraline hcl] and Sertraline    FAMILYHISTORY       Family History   Problem Relation Age of Onset    Heart Disease Mother     High Cholesterol Mother     High Blood Pressure Mother     Stroke Father     Heart Disease Father     Heart Disease Paternal Aunt           SOCIAL HISTORY       Social History     Socioeconomic History    Marital status:      Spouse name: None    Number of children: None    Years of education: None    Highest education level: None   Occupational History    None   Social Needs    Financial resource strain: None    Food insecurity:     Worry: None     Inability: None    Transportation needs:     Medical: None     Non-medical: None   Tobacco Use    Smoking status: Never Smoker    Smokeless tobacco: Never Used   Substance and Sexual Activity    Alcohol use: No     Alcohol/week: 0.0 oz    Drug use: No    Sexual activity: None   Lifestyle    Physical activity:     Days per week: None     Minutes per session: None    Stress: None   Relationships    Social connections:     Talks on phone: None     Gets together: None     Attends Caodaism service: None     Active member of club or organization: None     Attends meetings of clubs or organizations: None     Relationship status: None    Intimate partner violence:     Fear of current or ex partner: None     Emotionally abused: None     Physically abused: None     Forced sexual activity: None   Other Topics Concern    None   Social History Narrative    None       SCREENINGS             PHYSICAL EXAM    (up to 7 for level 4, 8 or more for level 5)     ED Triage Vitals [05/27/19 2137]   BP Temp Temp src Pulse Resp SpO2 Height Weight   (!) 155/79 97.9 °F (36.6 °C) -- 65 16 99 % 5' 7\" (1.702 m) 145 lb (65.8 kg)       Physical Exam   Constitutional: She is oriented to person, place, and time. She appears well-developed and well-nourished. HENT:   Head: Normocephalic and atraumatic. Right Ear: External ear normal.   Left Ear: External ear normal.   Nose: Nose normal.   Eyes: Right eye exhibits no discharge. Left eye exhibits no discharge. Neck: Normal range of motion. Neck supple. Cardiovascular: Normal rate, regular rhythm and normal heart sounds. Pulmonary/Chest: Effort normal and breath sounds normal. No stridor. No respiratory distress. She has no wheezes. Abdominal: Soft. She exhibits no distension and no mass. There is no tenderness. There is no guarding. Musculoskeletal: Normal range of motion. Neurological: She is alert and oriented to person, place, and time. Skin: Skin is warm and dry. She is not diaphoretic. Psychiatric: She has a normal mood and affect. Her behavior is normal.   Nursing note and vitals reviewed.       DIAGNOSTIC RESULTS   LABS:    Labs Reviewed   COMPREHENSIVE METABOLIC PANEL - Abnormal; Notable for the following components:       Result Value    Glucose 118 (*)     GFR Non- 47 (*)     GFR African American 56 (*)     Alkaline Phosphatase 137 (*)     All other components within normal limits    Narrative:     Performed at:  [facility]  [address] [facility], [place]   Phone 21  - Abnormal; Notable for the following components:    Pro- (*)     All other components within normal limits    Narrative:     Performed at:  [facility]  [address] [facility]  [place], [address]   Phone [phone]   URINE RT REFLEX TO CULTURE - Abnormal; Notable for the following components: Leukocyte Esterase, Urine SMALL (*)     All other components within normal limits    Narrative:     Performed at:  [facility] [address], [address]   Phone [phone]   URINE CULTURE   CBC WITH AUTO DIFFERENTIAL    Narrative:     Performed at:  [facility] [address],  [place], [address]   Phone [phone]   TROPONIN    Narrative:     Performed at:  [facility] [address]   Phone [phone]   APTT    Narrative:     Performed at:  [facility] [address]  [place], [address]   Phone [phone]   PROTIME-INR    Narrative:     Performed at:  [facility] [address],  [place], [address]   Phone [phone]   MICROSCOPIC URINALYSIS    Narrative:     Performed at:  [facility] [address],  [place], [address]   Phone [phone]       All other labs were within normal range or not returned as of this dictation. EKG: All EKG's are interpreted by the Emergency Department Physician who either signs orCo-signs this chart in the absence of a cardiologist.  Please see their note for interpretation of EKG. RADIOLOGY:   Non-plain film images such as CT, Ultrasound and MRI are read by the radiologist. Plain radiographic images are visualized andpreliminarily interpreted by the  ED Provider with the below findings:        Interpretation perthe Radiologist below, if available at the time of this note:    XR CHEST STANDARD (2 VW)   Final Result   Left basilar atelectasis or pneumonitis though the former is favored given   linear morphology. No results found.        PROCEDURES   Unless otherwise noted below, none     Procedures    CRITICAL CARE TIME   N/A    CONSULTS:  None      EMERGENCY DEPARTMENT COURSE and DIFFERENTIALDIAGNOSIS/MDM: Vitals:    Vitals:    05/27/19 2300 05/27/19 2319 05/27/19 2330 05/27/19 2345   BP: 136/68 (!) 147/57 134/62 139/65   Pulse: 64 64 71 73   Resp: 18 12 13 22   Temp:       SpO2: 97% 98% 98% 98%   Weight:       Height:           Patient was given thefollowing medications:  Medications - No data to display    Patient presents for evaluation of generalized weakness and fatigue. On exam, she appears anxious and is tearful but in no acute distress and nontoxic. Vitals are stable and she is afebrile. No hypotension or bradycardia at my initial assessment. Lungs are clear to auscultation bilaterally. Abdomen is benign. Please see attending note for EKG interpretation though this does show atrial fibrillation, consistent with her baseline. CBC and CMP are unremarkable. Troponin is negative. Coags are within normal limits. . Urinalysis is negative. Chest x-ray shows left basilar atelectasis. Patient remained asymptomatic throughout her stay in the ED. She was normo or slightly hypertensive with a heart rate in the 60s. At this time, I see no reason for additional workup, imaging, observation or admission of the patient at this time. She is agreeable to this plan. I estimate there is LOW risk for ACUTE CORONARY SYNDROME, INTRACRANIAL HEMORRHAGE, MALIGNANT DYSRHYTHMIA, MENINGITIS, PNEUMONIA, PULMONARY EMBOLISM, SEPSIS, SUBARACHNOID HEMORRHAGE, SUBDURAL HEMATOMA, STROKE, or URINARY TRACT INFECTION, thus I consider the discharge disposition reasonable. Anil Proctor and I have discussed the diagnosis and risks, and we agree with discharging home to follow-up with their primary doctor. We also discussed returning to the Emergency Department immediately if new or worsening symptoms occur. We have discussed the symptoms which are most concerning (e.g., changing or worsening pain, weakness, vomiting, fever) that necessitate immediate return. FINAL IMPRESSION      1. Weakness    2. Pre-syncope    3.  History of bradycardia          DISPOSITION/PLAN   DISPOSITION        PATIENT REFERREDTO:  Naomi Koch MD  [address] [id number] [phone]    Call   For a re-check in  1-2  days    [facility] Emergency Department  [address] [place] [address]  [phone]  Go to   If symptoms worsen      DISCHARGE MEDICATIONS:  New Prescriptions    No medications on file       DISCONTINUED MEDICATIONS:  Discontinued Medications    No medications on file              (Please note that portions ofthis note were completed with a voice recognition program.  Efforts were made to edit the dictations but occasionally words are mis-transcribed.)    Isa Ramirez PA-C (electronically signed)           Janine Hernandez PA-C  05/28/19 0002

## 2019-05-28 NOTE — ED NOTES
Bed: 22  Expected date:   Expected time:   Means of arrival: [place] [facility]  Comments:  Tricia López RN  05/27/19 2258

## 2019-05-29 LAB — URINE CULTURE, ROUTINE: NORMAL

## 2019-06-03 ENCOUNTER — TELEPHONE (OUTPATIENT)
Dept: CARDIOLOGY CLINIC | Age: 84
End: 2019-06-03

## 2019-06-03 NOTE — TELEPHONE ENCOUNTER
ED:5/27/19    EMERGENCY DEPARTMENT COURSE and DIFFERENTIALDIAGNOSIS/MDM:   Vitals:    Vitals          Vitals:     05/27/19 2300 05/27/19 2319 05/27/19 2330 05/27/19 2345   BP: 136/68 (!) 147/57 134/62 139/65   Pulse: 64 64 71 73   Resp: 18 12 13 22   Temp:           SpO2: 97% 98% 98% 98%   Weight:           Height:                    Patient was given thefollowing medications:  Medications - No data to display     Patient presents for evaluation of generalized weakness and fatigue. On exam, she appears anxious and is tearful but in no acute distress and nontoxic. Vitals are stable and she is afebrile. No hypotension or bradycardia at my initial assessment. Lungs are clear to auscultation bilaterally. Abdomen is benign. Please see attending note for EKG interpretation though this does show atrial fibrillation, consistent with her baseline.     CBC and CMP are unremarkable. Troponin is negative. Coags are within normal limits. . Urinalysis is negative. Chest x-ray shows left basilar atelectasis. Patient remained asymptomatic throughout her stay in the ED. She was normo or slightly hypertensive with a heart rate in the 60s. At this time, I see no reason for additional workup, imaging, observation or admission of the patient at this time. She is agreeable to this plan.     I estimate there is LOW risk for ACUTE CORONARY SYNDROME, INTRACRANIAL HEMORRHAGE, MALIGNANT DYSRHYTHMIA, MENINGITIS, PNEUMONIA, PULMONARY EMBOLISM, SEPSIS, SUBARACHNOID HEMORRHAGE, SUBDURAL HEMATOMA, STROKE, or URINARY TRACT INFECTION, thus I consider the discharge disposition reasonable. Ryan Parikh and I have discussed the diagnosis and risks, and we agree with discharging home to follow-up with their primary doctor. We also discussed returning to the Emergency Department immediately if new or worsening symptoms occur.  We have discussed the symptoms which are most concerning (e.g., changing or worsening pain, weakness, vomiting,

## 2019-06-03 NOTE — TELEPHONE ENCOUNTER
Pt calling. Pt b/p was 162/95 and pulse ws 63. Pt said she is nervous and jittery. Pt is SOB. Pt said that yesterday that her heart rate got as low as 41 bpm. Pt doesn't know what to do. Please call pt to advise. Pt had gone to the ER last week.

## 2019-06-03 NOTE — TELEPHONE ENCOUNTER
I spoke with pt and relayed message per LES. She was transferred to scheduling to make the appointments.

## 2019-06-04 ENCOUNTER — OFFICE VISIT (OUTPATIENT)
Dept: CARDIOLOGY CLINIC | Age: 84
End: 2019-06-04
Payer: MEDICARE

## 2019-06-04 ENCOUNTER — OFFICE VISIT (OUTPATIENT)
Dept: NEUROLOGY | Age: 84
End: 2019-06-04
Payer: MEDICARE

## 2019-06-04 VITALS
WEIGHT: 143 LBS | SYSTOLIC BLOOD PRESSURE: 134 MMHG | HEIGHT: 67 IN | HEART RATE: 70 BPM | DIASTOLIC BLOOD PRESSURE: 75 MMHG | BODY MASS INDEX: 22.44 KG/M2

## 2019-06-04 VITALS
WEIGHT: 144 LBS | HEART RATE: 92 BPM | HEIGHT: 66 IN | RESPIRATION RATE: 16 BRPM | DIASTOLIC BLOOD PRESSURE: 62 MMHG | SYSTOLIC BLOOD PRESSURE: 128 MMHG | BODY MASS INDEX: 23.14 KG/M2

## 2019-06-04 DIAGNOSIS — I48.0 PAROXYSMAL ATRIAL FIBRILLATION (HCC): Primary | Chronic | ICD-10-CM

## 2019-06-04 DIAGNOSIS — R00.2 PALPITATIONS: ICD-10-CM

## 2019-06-04 DIAGNOSIS — R27.0 ATAXIA: ICD-10-CM

## 2019-06-04 DIAGNOSIS — G60.9 IDIOPATHIC PERIPHERAL NEUROPATHY: Primary | ICD-10-CM

## 2019-06-04 PROCEDURE — 93000 ELECTROCARDIOGRAM COMPLETE: CPT | Performed by: INTERNAL MEDICINE

## 2019-06-04 PROCEDURE — 99214 OFFICE O/P EST MOD 30 MIN: CPT | Performed by: INTERNAL MEDICINE

## 2019-06-04 PROCEDURE — 99204 OFFICE O/P NEW MOD 45 MIN: CPT | Performed by: PSYCHIATRY & NEUROLOGY

## 2019-06-04 NOTE — PROGRESS NOTES
Denies all of the above     Musculoskeletal:   [] Back pain       []  Myalgias    []  Neck pain           [x] Denies all of the above    Neurological: As noted in HPI    Behavioral/Psych:   [x] Anxiety    []  Depression     []  Mood swings     [] Denies all of the above     Endocrine:   []  Temperature intolerance     [x] Fatigue      [] Denies all of the above     Allergic/Immunologic:   [] Hay fever    [x] Denies all of the above     Past Medical History:   Diagnosis Date    Anxiety     Atrial fibrillation (HCC)     Disorder of bone and cartilage, unspecified     Fatigue     Hypertension     Migraine, unspecified, without mention of intractable migraine without mention of status migrainosus     Other disorders of kidney and ureter     Peripheral neuropathy 4/17/2015    Psychiatric problem     Risk for falls 5/20/14    Spinal stenosis     Thyroid disease     Unspecified glaucoma(365.9)     Unspecified hearing loss      Family History   Problem Relation Age of Onset    Heart Disease Mother     High Cholesterol Mother     High Blood Pressure Mother     Stroke Father     Heart Disease Father     Heart Disease Paternal Aunt      Social History     Socioeconomic History    Marital status:      Spouse name: None    Number of children: None    Years of education: None    Highest education level: None   Occupational History    None   Social Needs    Financial resource strain: None    Food insecurity:     Worry: None     Inability: None    Transportation needs:     Medical: None     Non-medical: None   Tobacco Use    Smoking status: Never Smoker    Smokeless tobacco: Never Used   Substance and Sexual Activity    Alcohol use: No     Alcohol/week: 0.0 oz    Drug use: No    Sexual activity: None   Lifestyle    Physical activity:     Days per week: None     Minutes per session: None    Stress: None   Relationships    Social connections:     Talks on phone: None     Gets together: None Attends Amish service: None     Active member of club or organization: None     Attends meetings of clubs or organizations: None     Relationship status: None    Intimate partner violence:     Fear of current or ex partner: None     Emotionally abused: None     Physically abused: None     Forced sexual activity: None   Other Topics Concern    None   Social History Narrative    None       PHYSICAL EXAMINATION:  /75   Pulse 70   Ht 5' 7\" (1.702 m)   Wt 143 lb (64.9 kg)   BMI 22.40 kg/m²   Appearance: Well appearing, well nourished and in no distress  Mental Status Exam: Patient is alert, oriented to person, place and time. Recent and remote memory is normal  Fund of Knowledge is normal  Attention/concentration is normal.   Speech : No dysarthria  Language : No aphasia  Funduscopic Exam: sharp disc margins  Cranial Nerves:   II: Visual fields:  Full to confrontation  III: Pupils:  equal, round, reactive to light  III,IV,VI: Extra Ocular Movements are intact. No nystagmus  V: Facial sensation is intact to pin prick and light touch  VII: Facial strength and movements: intact and symmetric smile,cheek puffing and eyebrow elevation  VIII: Hearing:  Intact to finger rub bilaterally  IX: Palate  elevation is symmetric  XI: Shoulder shrug is intact  XII: Tongue movements are normal  Motor:  Muscle tone and bulk are normal.   Strength is symmetrical 4+ /5 in all four extremities. Sensory: Decreased to light touch and  pin prick in bilateral distal lower extremities  Coordination:  Normal  Finger to Nose and Heel to Shin bilaterally    . Reflexes:  DTR barely elicitable in the upper extremities and absent in the lower extremities  Plantar response: Flexor bilaterally  Gait: Gait and station is unsteady  Romberg: negative  Vascular: No carotid bruit bilaterally        DATA:  LABS:  General Labs:    CBC:   Lab Results   Component Value Date    WBC 4.7 05/27/2019    RBC 4.27 05/27/2019    HGB 14.1 05/27/2019 HCT 40.8 05/27/2019    MCV 95.7 05/27/2019    MCH 32.9 05/27/2019    MCHC 34.4 05/27/2019    RDW 13.1 05/27/2019     05/27/2019    MPV 8.3 05/27/2019     BMP:    Lab Results   Component Value Date     05/27/2019    K 4.7 05/27/2019     05/27/2019    CO2 23 05/27/2019    BUN 20 05/27/2019    LABALBU 4.3 05/27/2019    CREATININE 1.1 05/27/2019    CALCIUM 9.7 05/27/2019    GFRAA 56 05/27/2019    GFRAA >60 05/09/2013    LABGLOM 47 05/27/2019    GLUCOSE 118 05/27/2019       IMPRESSION :  Idiopathic peripheral neuropathy  Ataxia  Borderline B12 levels with levels always below 300  Patient has no started taking oral B12 supplementation  TSH was normal  EMG in the past had shown severe peripheral neuropathy      RECOMMENDATIONS :  Discussed with patient and her   She had a number of questions regarding laser treatment and stem cell replacement. I explained to her that I'm not aware if any scientific proof that any of these specific treatments reversed neuropathy. I will check a repeat B12 level and if it is still below 300, she may benefit from B12 replacement injections on a monthly basis  I have strongly recommended that she continue to use a cane to prevent falls. Thank you for this consultation. Please note a portion of this chart was generated using dragon dictation software. Although every effort was made to ensure the accuracy of this automated transcription, some errors in transcription may have occurred.

## 2019-06-04 NOTE — PROGRESS NOTES
[address]   Cardiac Follow-up    Referring Provider:  Logan Flores MD     Chief Complaint   Patient presents with    6 Month Follow-Up     Discuss BP    Hypertension    Atrial Fibrillation      History of Present Illness: Mrs. Ani Jimenez presents today in follow up for irregular heart beat; she was a former patient of Dr. Alicia Lu. She has a history of hypertension and anxiety. She was originally diagnosed with Atrial fib in November of 2011 but converted spontaneously without treatment. She presented to the ER in October 2012 with palpitations; ECG at that time showed sinus arrhythmia. She then wore a Holter monitor in 10/2012 which showed short burst of PAF and was started on Xarelto, but no longer on it because of h/o anemia. Event recorder 2014 showed PAC's and brief PAT. Holter thru [address] in 2015 showed AF, amio was recommended. She takes Ativan as needed for her anxiety. Today, she states that overall she feels OK but continues to feel weak and fatigued, and nervous. She went to the emergency room over the weekend due to feeling very faint, and weak. She is bothered when her pulse is in the 40's or in the 100's. She states she has seen her Bp as low as 109/60, times it is also elevated. She states she has not taken her diltiazem for a few days because she felt it might be \"to strong\". She admits to being overly anxious and continues on Ativan for this as needed (~ few times a week). She states she has been very stressed, and anxious lately.        Past Medical History:   has a past medical history of Anxiety, Atrial fibrillation ([address] Disorder of bone and cartilage, unspecified, Fatigue, Hypertension, Migraine, unspecified, without mention of intractable migraine without mention of status migrainosus, Other disorders of kidney and ureter, Peripheral neuropathy, Psychiatric problem, Risk for falls, Spinal stenosis, Thyroid disease, Unspecified glaucoma(365.9), and Unspecified joint complaints. · Integumentary: No rash or pruritis. · Neurological: No headache, diplopia, change in muscle strength, numbness or tingling. No change in gait, balance, coordination, mood, affect, memory, mentation, behavior. · Psychiatric: History of anxiety  · Endocrine: No malaise, fatigue or temperature intolerance. No excessive thirst, fluid intake, or urination. No tremor. · Hematologic/Lymphatic: No abnormal bruising or bleeding, blood clots or swollen lymph nodes. · Allergic/Immunologic: No nasal congestion or hives. Physical Examination:    Blood pressure 128/62, pulse 92, resp. rate 16, height 5' 6\" (1.676 m), weight 144 lb (65.3 kg), not currently breastfeeding. Constitutional and General Appearance: NAD   Skin:good turgor,intact without lesions  HEENT: EOMI ,normal  Neck:no JVD    Respiratory:  · Normal excursion and expansion without use of accessory muscles  · Resp Auscultation: Normal breath sounds without dullness  Cardiovascular:  · The apical impulses not displaced  · Heart tones are crisp and normal  · Cervical veins are not engorged  · The carotid upstroke is normal in amplitude and contour without delay or bruit  · Normal S1S2, No S3, AS Murmur,  Rhythm is regular, rate is controlled  · Peripheral pulses are symmetrical and full  · There is no clubbing, cyanosis of the extremities. · No edema. · Femoral Arteries: 2+ and equal  · Pedal Pulses: 2+ and equal   Abdomen:  · No masses or tenderness  · Liver/Spleen: No Abnormalities Noted  Neurological/Psychiatric:  · Alert and oriented in all spheres  · Moves all extremities well  · Exhibits normal gait balance and coordination  · No abnormalities of mood, affect, memory, mentation, or behavior are noted    Carotid dopplers 7/7/17:  No significant stenosis nanci.     Myoview 2/2017 normal    Echo 2/2016  Summary   -Normal left ventricle size, wall thickness and systolic function with an   estimated ejection fraction of 55%.   -Mild to moderate mitral regurgitation is present.   -Trivial aortic regurgitation is present.   -There is mild tricuspid regurgitation with RVSP estimated at 42 mmHg. Holter monitor 2015 ([address]):  Baseline rhythm is atrial fibrillation, with HR ranging between 48 to  124 bpm, average HR 68 bpm.  No significant conduction defects or pauses. Rare PVCs. Patient complaints of dizziness and nausea were correlated with AF at the rates of 70s-80s. Echo  5/2013  Preserved LV systolic function with EF of 60%  Mild mitral regurgitation is present. Mild tricuspid regurgitation with RVSP estimated at 39 mmHg. Assessment:     1. Paroxysmal Atrial fibrillation: In NSR with PACs on 3/9/18. Most of her ECGs show sinus rhythm with very frequent PACs, which might be mistaken for AF. Event recorder 4/2014> PAF- burden unclear but probably minimal  Holter monitor worn 11/2015 with [address] showed AF, ahr 68.   - stop diltiazem, order 30 day event recorder    4. Anxiety/Depression: treated with prn Ativan> takes 2-3 times a week. Plan:  Mrs. Sesar Juarez has a stable cardiac status. She does have issues with fatigue at times but no clear etiology. 1. Stop Diltiazem. 2. 30 day event recorder   3. Will continue with risk factor modifications. 4. Follow up with cardiology 3 months. She should not be on anticoagulation at this time due to infrequent nature of a. Fib and advanced age    Thank you for allowing me to participate in the care of this individual.    Scribe's attestation: This note was scribed in the presence of Dr. Buddy Monsalve MD, by Reinaldo Owen RN. The scribe's documentation has been prepared under my direction and personally reviewed by me in its entirety. I confirm that the note above accurately reflects all work, treatment, procedures, and medical decision making performed by me.

## 2019-06-13 DIAGNOSIS — F41.9 ANXIETY: Primary | ICD-10-CM

## 2019-06-13 RX ORDER — LORAZEPAM 0.5 MG/1
0.5 TABLET ORAL 3 TIMES DAILY
Qty: 90 TABLET | Refills: 0 | Status: SHIPPED | OUTPATIENT
Start: 2019-06-13 | End: 2019-06-17 | Stop reason: SDUPTHER

## 2019-06-17 ENCOUNTER — TELEPHONE (OUTPATIENT)
Dept: FAMILY MEDICINE CLINIC | Age: 84
End: 2019-06-17

## 2019-06-17 DIAGNOSIS — F41.9 ANXIETY: ICD-10-CM

## 2019-06-17 RX ORDER — LORAZEPAM 0.5 MG/1
0.5 TABLET ORAL 3 TIMES DAILY
Qty: 90 TABLET | Refills: 0 | Status: SHIPPED | OUTPATIENT
Start: 2019-06-17 | End: 2019-09-17 | Stop reason: SDUPTHER

## 2019-06-17 NOTE — TELEPHONE ENCOUNTER
Called [facility] pharmacy - spoke with Dexter - cancelled prescription. Will send new prescription to [facility] [address] as requested. Please call patient to let her know.

## 2019-06-17 NOTE — TELEPHONE ENCOUNTER
LORazepam (ATIVAN) 0.5 MG tablet 90 tablet 0 6/13/2019 6/13/2020    Sig - Route: Take 1 tablet by mouth 3 times daily. - Oral    Sent to pharmacy as: LORazepam 0.5 MG Oral Tablet    E-Prescribing Status: Receipt confirmed by pharmacy (6/13/2019  8:32 PM EDT)      Patient is calling to see if she can get medication changed to [employer].  Did not want sent to [facility]

## 2019-06-20 ENCOUNTER — TELEPHONE (OUTPATIENT)
Dept: CARDIOLOGY CLINIC | Age: 84
End: 2019-06-20

## 2019-06-20 NOTE — TELEPHONE ENCOUNTER
Pt still waiting on patches. Mihir states tracking shows she should get them before 6pm today. LM for pt to call us back to find out if she is having any sx's for her to be concerned about her being off of diltiazem.

## 2019-06-20 NOTE — TELEPHONE ENCOUNTER
Pt was breaking out from the patches on the event monitor, she was told to call [employer], she did contact them and they told her they were send out new patches, she was supposed to have gotten them on Tuesday but didn't come so she called and they said it would be delivered today and so far nothing has come. She has taken the monitor off (hasn't wore it for 2 days). LES took her off the dilitiazem and she is afraid something could happen to her. Please call to advise. Thank you.

## 2019-06-21 ENCOUNTER — TELEPHONE (OUTPATIENT)
Dept: CARDIOLOGY CLINIC | Age: 84
End: 2019-06-21

## 2019-06-24 NOTE — TELEPHONE ENCOUNTER
LVM that it was ok to continue to wait for the patches and she will be ok to continue to be off the medication - stated to call the office back with any other questions or concerns

## 2019-07-01 ENCOUNTER — TELEPHONE (OUTPATIENT)
Dept: CARDIOLOGY CLINIC | Age: 84
End: 2019-07-01

## 2019-07-01 NOTE — TELEPHONE ENCOUNTER
Spoke with patient states that she received the cloth pads and has been using them for a week now. States that her monitor has been extended till July 15th and that her chest has been irritated and broke out. Patient would like to know if you can recommend something she can take to put on the irration. States that in the last 2-3 weeks she has been more fatigue than usual , please address what you would like the patient to do. Patient states she isn't having any other issues.

## 2019-07-17 PROCEDURE — 93228 REMOTE 30 DAY ECG REV/REPORT: CPT | Performed by: INTERNAL MEDICINE

## 2019-07-19 ENCOUNTER — TELEPHONE (OUTPATIENT)
Dept: CARDIOLOGY CLINIC | Age: 84
End: 2019-07-19

## 2019-07-20 ENCOUNTER — OFFICE VISIT (OUTPATIENT)
Dept: FAMILY MEDICINE CLINIC | Age: 84
End: 2019-07-20
Payer: MEDICARE

## 2019-07-20 VITALS
TEMPERATURE: 98.1 F | WEIGHT: 145 LBS | DIASTOLIC BLOOD PRESSURE: 70 MMHG | SYSTOLIC BLOOD PRESSURE: 138 MMHG | OXYGEN SATURATION: 98 % | HEART RATE: 73 BPM | BODY MASS INDEX: 23.4 KG/M2

## 2019-07-20 DIAGNOSIS — F41.1 ANXIETY STATE: ICD-10-CM

## 2019-07-20 DIAGNOSIS — R53.82 CHRONIC FATIGUE: Primary | ICD-10-CM

## 2019-07-20 PROCEDURE — 99213 OFFICE O/P EST LOW 20 MIN: CPT | Performed by: FAMILY MEDICINE

## 2019-07-20 ASSESSMENT — ENCOUNTER SYMPTOMS
VOMITING: 0
SORE THROAT: 0
ABDOMINAL PAIN: 0

## 2019-08-01 DIAGNOSIS — E03.9 ACQUIRED HYPOTHYROIDISM: ICD-10-CM

## 2019-08-01 RX ORDER — LEVOTHYROXINE SODIUM 0.05 MG/1
50 TABLET ORAL DAILY
Qty: 90 TABLET | Refills: 3 | Status: SHIPPED | OUTPATIENT
Start: 2019-08-01 | End: 2020-03-24 | Stop reason: SDUPTHER

## 2019-08-13 RX ORDER — DILTIAZEM HYDROCHLORIDE 120 MG/1
120 CAPSULE, COATED, EXTENDED RELEASE ORAL DAILY
Qty: 30 CAPSULE | Refills: 5 | Status: SHIPPED | OUTPATIENT
Start: 2019-08-13 | End: 2019-09-23 | Stop reason: SDUPTHER

## 2019-09-17 DIAGNOSIS — F41.9 ANXIETY: ICD-10-CM

## 2019-09-17 RX ORDER — LORAZEPAM 0.5 MG/1
TABLET ORAL
Qty: 90 TABLET | Refills: 0 | Status: SHIPPED | OUTPATIENT
Start: 2019-09-17 | End: 2019-12-05 | Stop reason: SDUPTHER

## 2019-09-23 RX ORDER — DILTIAZEM HYDROCHLORIDE 120 MG/1
120 CAPSULE, COATED, EXTENDED RELEASE ORAL DAILY
Qty: 90 CAPSULE | Refills: 3 | Status: SHIPPED | OUTPATIENT
Start: 2019-09-23 | End: 2019-11-19

## 2019-10-17 ENCOUNTER — TELEPHONE (OUTPATIENT)
Dept: DERMATOLOGY | Age: 84
End: 2019-10-17

## 2019-10-21 ENCOUNTER — OFFICE VISIT (OUTPATIENT)
Dept: DERMATOLOGY | Age: 84
End: 2019-10-21
Payer: MEDICARE

## 2019-10-21 DIAGNOSIS — L57.0 ACTINIC KERATOSIS: Primary | ICD-10-CM

## 2019-10-21 PROCEDURE — 99212 OFFICE O/P EST SF 10 MIN: CPT | Performed by: DERMATOLOGY

## 2019-10-21 RX ORDER — FLUOROURACIL 50 MG/G
CREAM TOPICAL
Qty: 40 G | Refills: 0 | Status: SHIPPED | OUTPATIENT
Start: 2019-10-21 | End: 2019-12-23

## 2019-11-14 ENCOUNTER — HOSPITAL ENCOUNTER (OUTPATIENT)
Age: 84
Discharge: HOME | End: 2019-11-14
Payer: MEDICARE

## 2019-11-14 DIAGNOSIS — G60.9 IDIOPATHIC PERIPHERAL NEUROPATHY: ICD-10-CM

## 2019-11-14 DIAGNOSIS — Z13.220 SCREENING FOR LIPID DISORDERS: ICD-10-CM

## 2019-11-14 LAB
A/G RATIO: 1.5 (ref 1.1–2.2)
ALBUMIN SERPL-MCNC: 4.6 G/DL (ref 3.4–5)
ALP BLD-CCNC: 109 U/L (ref 40–129)
ALT SERPL-CCNC: 15 U/L (ref 10–40)
ANION GAP SERPL CALCULATED.3IONS-SCNC: 14 MMOL/L (ref 3–16)
AST SERPL-CCNC: 16 U/L (ref 15–37)
BASOPHILS ABSOLUTE: 0 K/UL (ref 0–0.2)
BASOPHILS RELATIVE PERCENT: 0.7 %
BILIRUB SERPL-MCNC: 0.6 MG/DL (ref 0–1)
BUN BLDV-MCNC: 19 MG/DL (ref 7–20)
CALCIUM SERPL-MCNC: 9.6 MG/DL (ref 8.3–10.6)
CHLORIDE BLD-SCNC: 103 MMOL/L (ref 99–110)
CHOLESTEROL, FASTING: 136 MG/DL (ref 0–199)
CO2: 22 MMOL/L (ref 21–32)
CREAT SERPL-MCNC: 1 MG/DL (ref 0.6–1.2)
EOSINOPHILS ABSOLUTE: 0 K/UL (ref 0–0.6)
EOSINOPHILS RELATIVE PERCENT: 0.6 %
GFR AFRICAN AMERICAN: >60
GFR NON-AFRICAN AMERICAN: 52
GLOBULIN: 3.1 G/DL
GLUCOSE FASTING: 102 MG/DL (ref 70–99)
HCT VFR BLD CALC: 39.6 % (ref 36–48)
HDLC SERPL-MCNC: 50 MG/DL (ref 40–60)
HEMOGLOBIN: 13.4 G/DL (ref 12–16)
LDL CHOLESTEROL CALCULATED: 54 MG/DL
LYMPHOCYTES ABSOLUTE: 1.3 K/UL (ref 1–5.1)
LYMPHOCYTES RELATIVE PERCENT: 31.7 %
MCH RBC QN AUTO: 32.3 PG (ref 26–34)
MCHC RBC AUTO-ENTMCNC: 33.8 G/DL (ref 31–36)
MCV RBC AUTO: 95.4 FL (ref 80–100)
MONOCYTES ABSOLUTE: 0.4 K/UL (ref 0–1.3)
MONOCYTES RELATIVE PERCENT: 9.3 %
NEUTROPHILS ABSOLUTE: 2.3 K/UL (ref 1.7–7.7)
NEUTROPHILS RELATIVE PERCENT: 57.7 %
PDW BLD-RTO: 12.7 % (ref 12.4–15.4)
PLATELET # BLD: 179 K/UL (ref 135–450)
PMV BLD AUTO: 9.4 FL (ref 5–10.5)
POTASSIUM SERPL-SCNC: 4.9 MMOL/L (ref 3.5–5.1)
RBC # BLD: 4.15 M/UL (ref 4–5.2)
SODIUM BLD-SCNC: 139 MMOL/L (ref 136–145)
TOTAL PROTEIN: 7.7 G/DL (ref 6.4–8.2)
TRIGLYCERIDE, FASTING: 159 MG/DL (ref 0–150)
VITAMIN B-12: 730 PG/ML (ref 211–911)
VLDLC SERPL CALC-MCNC: 32 MG/DL
WBC # BLD: 4.1 K/UL (ref 4–11)

## 2019-11-14 PROCEDURE — 80061 LIPID PANEL: CPT

## 2019-11-14 PROCEDURE — 36415 COLL VENOUS BLD VENIPUNCTURE: CPT

## 2019-11-14 PROCEDURE — 85025 COMPLETE CBC W/AUTO DIFF WBC: CPT

## 2019-11-14 PROCEDURE — 80053 COMPREHEN METABOLIC PANEL: CPT

## 2019-11-14 PROCEDURE — 82607 VITAMIN B-12: CPT

## 2019-11-19 ENCOUNTER — NURSE TRIAGE (OUTPATIENT)
Dept: OTHER | Facility: CLINIC | Age: 84
End: 2019-11-19

## 2019-11-19 ENCOUNTER — OFFICE VISIT (OUTPATIENT)
Dept: FAMILY MEDICINE CLINIC | Age: 84
End: 2019-11-19
Payer: MEDICARE

## 2019-11-19 VITALS
BODY MASS INDEX: 23.48 KG/M2 | RESPIRATION RATE: 12 BRPM | OXYGEN SATURATION: 98 % | HEART RATE: 66 BPM | SYSTOLIC BLOOD PRESSURE: 132 MMHG | DIASTOLIC BLOOD PRESSURE: 72 MMHG | WEIGHT: 145.5 LBS

## 2019-11-19 DIAGNOSIS — E78.2 MIXED HYPERLIPIDEMIA: Primary | ICD-10-CM

## 2019-11-19 DIAGNOSIS — E03.9 ACQUIRED HYPOTHYROIDISM: ICD-10-CM

## 2019-11-19 DIAGNOSIS — F41.1 ANXIETY STATE: ICD-10-CM

## 2019-11-19 PROCEDURE — 99213 OFFICE O/P EST LOW 20 MIN: CPT | Performed by: FAMILY MEDICINE

## 2019-11-19 RX ORDER — LISINOPRIL 10 MG/1
10 TABLET ORAL DAILY
COMMUNITY
End: 2019-12-23

## 2019-11-19 ASSESSMENT — ENCOUNTER SYMPTOMS: SHORTNESS OF BREATH: 1

## 2019-12-05 DIAGNOSIS — F41.9 ANXIETY: ICD-10-CM

## 2019-12-05 RX ORDER — LORAZEPAM 0.5 MG/1
TABLET ORAL
Qty: 90 TABLET | Refills: 0 | Status: ON HOLD | OUTPATIENT
Start: 2019-12-05 | End: 2019-12-27 | Stop reason: SDUPTHER

## 2019-12-23 ENCOUNTER — APPOINTMENT (OUTPATIENT)
Dept: GENERAL RADIOLOGY | Age: 84
DRG: 470 | End: 2019-12-23
Payer: MEDICARE

## 2019-12-23 ENCOUNTER — HOSPITAL ENCOUNTER (INPATIENT)
Age: 84
LOS: 8 days | Discharge: NURSING, REHAB OR LONG-TERM CARE | DRG: 470 | End: 2019-12-31
Attending: EMERGENCY MEDICINE | Admitting: HOSPITALIST
Payer: MEDICARE

## 2019-12-23 ENCOUNTER — APPOINTMENT (OUTPATIENT)
Dept: CT IMAGING | Age: 84
DRG: 470 | End: 2019-12-23
Payer: MEDICARE

## 2019-12-23 ENCOUNTER — ANESTHESIA EVENT (OUTPATIENT)
Dept: OPERATING ROOM | Age: 84
DRG: 470 | End: 2019-12-23
Payer: MEDICARE

## 2019-12-23 PROBLEM — S72.002A CLOSED LEFT HIP FRACTURE, INITIAL ENCOUNTER (HCC): Status: ACTIVE | Noted: 2019-12-23

## 2019-12-23 LAB
A/G RATIO: 1.2 (ref 1.1–2.2)
ALBUMIN SERPL-MCNC: 3.8 G/DL (ref 3.4–5)
ALP BLD-CCNC: 111 U/L (ref 40–129)
ALT SERPL-CCNC: 15 U/L (ref 10–40)
ANION GAP SERPL CALCULATED.3IONS-SCNC: 11 MMOL/L (ref 3–16)
AST SERPL-CCNC: 17 U/L (ref 15–37)
BILIRUB SERPL-MCNC: 0.4 MG/DL (ref 0–1)
BUN BLDV-MCNC: 22 MG/DL (ref 7–20)
CALCIUM SERPL-MCNC: 9.2 MG/DL (ref 8.3–10.6)
CHLORIDE BLD-SCNC: 104 MMOL/L (ref 99–110)
CO2: 24 MMOL/L (ref 21–32)
CREAT SERPL-MCNC: 1.2 MG/DL (ref 0.6–1.2)
EKG ATRIAL RATE: 125 BPM
EKG DIAGNOSIS: NORMAL
EKG Q-T INTERVAL: 360 MS
EKG QRS DURATION: 74 MS
EKG QTC CALCULATION (BAZETT): 410 MS
EKG R AXIS: -10 DEGREES
EKG T AXIS: 49 DEGREES
EKG VENTRICULAR RATE: 78 BPM
GFR AFRICAN AMERICAN: 51
GFR NON-AFRICAN AMERICAN: 42
GLOBULIN: 3.1 G/DL
GLUCOSE BLD-MCNC: 121 MG/DL (ref 70–99)
HCT VFR BLD CALC: 38.4 % (ref 36–48)
HEMOGLOBIN: 13.1 G/DL (ref 12–16)
INR BLD: 0.99 (ref 0.86–1.14)
MCH RBC QN AUTO: 32.5 PG (ref 26–34)
MCHC RBC AUTO-ENTMCNC: 34 G/DL (ref 31–36)
MCV RBC AUTO: 95.6 FL (ref 80–100)
PDW BLD-RTO: 12.9 % (ref 12.4–15.4)
PLATELET # BLD: 175 K/UL (ref 135–450)
PMV BLD AUTO: 8.5 FL (ref 5–10.5)
POTASSIUM REFLEX MAGNESIUM: 4 MMOL/L (ref 3.5–5.1)
PRO-BNP: 236 PG/ML (ref 0–449)
PROTHROMBIN TIME: 11.5 SEC (ref 10–13.2)
RBC # BLD: 4.02 M/UL (ref 4–5.2)
SODIUM BLD-SCNC: 139 MMOL/L (ref 136–145)
TOTAL PROTEIN: 6.9 G/DL (ref 6.4–8.2)
WBC # BLD: 3.9 K/UL (ref 4–11)

## 2019-12-23 PROCEDURE — 83880 ASSAY OF NATRIURETIC PEPTIDE: CPT

## 2019-12-23 PROCEDURE — 71045 X-RAY EXAM CHEST 1 VIEW: CPT

## 2019-12-23 PROCEDURE — 99223 1ST HOSP IP/OBS HIGH 75: CPT | Performed by: INTERNAL MEDICINE

## 2019-12-23 PROCEDURE — 6370000000 HC RX 637 (ALT 250 FOR IP): Performed by: INTERNAL MEDICINE

## 2019-12-23 PROCEDURE — 85610 PROTHROMBIN TIME: CPT

## 2019-12-23 PROCEDURE — 94760 N-INVAS EAR/PLS OXIMETRY 1: CPT

## 2019-12-23 PROCEDURE — 6360000002 HC RX W HCPCS: Performed by: EMERGENCY MEDICINE

## 2019-12-23 PROCEDURE — 99222 1ST HOSP IP/OBS MODERATE 55: CPT | Performed by: NURSE PRACTITIONER

## 2019-12-23 PROCEDURE — 80053 COMPREHEN METABOLIC PANEL: CPT

## 2019-12-23 PROCEDURE — 73700 CT LOWER EXTREMITY W/O DYE: CPT

## 2019-12-23 PROCEDURE — 96374 THER/PROPH/DIAG INJ IV PUSH: CPT

## 2019-12-23 PROCEDURE — 85027 COMPLETE CBC AUTOMATED: CPT

## 2019-12-23 PROCEDURE — 93005 ELECTROCARDIOGRAM TRACING: CPT | Performed by: EMERGENCY MEDICINE

## 2019-12-23 PROCEDURE — 96376 TX/PRO/DX INJ SAME DRUG ADON: CPT

## 2019-12-23 PROCEDURE — 1200000000 HC SEMI PRIVATE

## 2019-12-23 PROCEDURE — 99285 EMERGENCY DEPT VISIT HI MDM: CPT

## 2019-12-23 PROCEDURE — 6360000002 HC RX W HCPCS: Performed by: HOSPITALIST

## 2019-12-23 PROCEDURE — 6370000000 HC RX 637 (ALT 250 FOR IP): Performed by: HOSPITALIST

## 2019-12-23 PROCEDURE — 2580000003 HC RX 258: Performed by: HOSPITALIST

## 2019-12-23 PROCEDURE — 93010 ELECTROCARDIOGRAM REPORT: CPT | Performed by: INTERNAL MEDICINE

## 2019-12-23 RX ORDER — ASPIRIN 81 MG/1
81 TABLET, CHEWABLE ORAL DAILY
Status: DISCONTINUED | OUTPATIENT
Start: 2019-12-23 | End: 2020-01-01 | Stop reason: HOSPADM

## 2019-12-23 RX ORDER — SODIUM CHLORIDE 0.9 % (FLUSH) 0.9 %
10 SYRINGE (ML) INJECTION EVERY 12 HOURS SCHEDULED
Status: DISCONTINUED | OUTPATIENT
Start: 2019-12-23 | End: 2019-12-24

## 2019-12-23 RX ORDER — HYDROCODONE BITARTRATE AND ACETAMINOPHEN 5; 325 MG/1; MG/1
2 TABLET ORAL EVERY 4 HOURS PRN
Status: DISCONTINUED | OUTPATIENT
Start: 2019-12-23 | End: 2020-01-01 | Stop reason: HOSPADM

## 2019-12-23 RX ORDER — ACETAMINOPHEN 325 MG/1
650 TABLET ORAL EVERY 4 HOURS PRN
Status: DISCONTINUED | OUTPATIENT
Start: 2019-12-23 | End: 2020-01-01 | Stop reason: HOSPADM

## 2019-12-23 RX ORDER — SODIUM CHLORIDE 0.9 % (FLUSH) 0.9 %
10 SYRINGE (ML) INJECTION PRN
Status: DISCONTINUED | OUTPATIENT
Start: 2019-12-23 | End: 2019-12-24

## 2019-12-23 RX ORDER — HYDROMORPHONE HYDROCHLORIDE 1 MG/ML
0.5 INJECTION, SOLUTION INTRAMUSCULAR; INTRAVENOUS; SUBCUTANEOUS
Status: DISCONTINUED | OUTPATIENT
Start: 2019-12-23 | End: 2020-01-01 | Stop reason: HOSPADM

## 2019-12-23 RX ORDER — POLYETHYLENE GLYCOL 3350 17 G/17G
17 POWDER, FOR SOLUTION ORAL DAILY PRN
Status: DISCONTINUED | OUTPATIENT
Start: 2019-12-23 | End: 2020-01-01 | Stop reason: HOSPADM

## 2019-12-23 RX ORDER — ONDANSETRON 2 MG/ML
4 INJECTION INTRAMUSCULAR; INTRAVENOUS EVERY 6 HOURS PRN
Status: DISCONTINUED | OUTPATIENT
Start: 2019-12-23 | End: 2019-12-24

## 2019-12-23 RX ORDER — HYDROCODONE BITARTRATE AND ACETAMINOPHEN 5; 325 MG/1; MG/1
1 TABLET ORAL EVERY 4 HOURS PRN
Status: DISCONTINUED | OUTPATIENT
Start: 2019-12-23 | End: 2020-01-01 | Stop reason: HOSPADM

## 2019-12-23 RX ORDER — ACETAMINOPHEN 500 MG
1000 TABLET ORAL EVERY 8 HOURS
Status: DISCONTINUED | OUTPATIENT
Start: 2019-12-23 | End: 2019-12-23 | Stop reason: SDUPTHER

## 2019-12-23 RX ORDER — HYDROMORPHONE HYDROCHLORIDE 1 MG/ML
0.5 INJECTION, SOLUTION INTRAMUSCULAR; INTRAVENOUS; SUBCUTANEOUS ONCE
Status: COMPLETED | OUTPATIENT
Start: 2019-12-23 | End: 2019-12-23

## 2019-12-23 RX ORDER — LEVOTHYROXINE SODIUM 0.03 MG/1
50 TABLET ORAL
Status: DISCONTINUED | OUTPATIENT
Start: 2019-12-23 | End: 2020-01-01 | Stop reason: HOSPADM

## 2019-12-23 RX ORDER — HYDROMORPHONE HYDROCHLORIDE 1 MG/ML
0.25 INJECTION, SOLUTION INTRAMUSCULAR; INTRAVENOUS; SUBCUTANEOUS
Status: DISCONTINUED | OUTPATIENT
Start: 2019-12-23 | End: 2020-01-01 | Stop reason: HOSPADM

## 2019-12-23 RX ORDER — LORAZEPAM 0.5 MG/1
0.5 TABLET ORAL EVERY 8 HOURS PRN
Status: DISCONTINUED | OUTPATIENT
Start: 2019-12-23 | End: 2020-01-01 | Stop reason: HOSPADM

## 2019-12-23 RX ADMIN — HYDROCODONE BITARTRATE AND ACETAMINOPHEN 1 TABLET: 5; 325 TABLET ORAL at 12:53

## 2019-12-23 RX ADMIN — METOPROLOL TARTRATE 25 MG: 25 TABLET, FILM COATED ORAL at 19:49

## 2019-12-23 RX ADMIN — METOPROLOL TARTRATE 25 MG: 25 TABLET, FILM COATED ORAL at 12:54

## 2019-12-23 RX ADMIN — HYDROMORPHONE HYDROCHLORIDE 0.5 MG: 1 INJECTION, SOLUTION INTRAMUSCULAR; INTRAVENOUS; SUBCUTANEOUS at 10:01

## 2019-12-23 RX ADMIN — Medication 10 ML: at 12:30

## 2019-12-23 RX ADMIN — ENOXAPARIN SODIUM 30 MG: 30 INJECTION SUBCUTANEOUS at 13:56

## 2019-12-23 RX ADMIN — LORAZEPAM 0.5 MG: 0.5 TABLET ORAL at 12:53

## 2019-12-23 RX ADMIN — Medication 10 ML: at 22:33

## 2019-12-23 RX ADMIN — LORAZEPAM 0.5 MG: 0.5 TABLET ORAL at 22:32

## 2019-12-23 RX ADMIN — Medication 10 ML: at 19:49

## 2019-12-23 RX ADMIN — HYDROMORPHONE HYDROCHLORIDE 0.5 MG: 1 INJECTION, SOLUTION INTRAMUSCULAR; INTRAVENOUS; SUBCUTANEOUS at 05:58

## 2019-12-23 RX ADMIN — HYDROMORPHONE HYDROCHLORIDE 0.5 MG: 1 INJECTION, SOLUTION INTRAMUSCULAR; INTRAVENOUS; SUBCUTANEOUS at 02:58

## 2019-12-23 RX ADMIN — HYDROMORPHONE HYDROCHLORIDE 0.5 MG: 1 INJECTION, SOLUTION INTRAMUSCULAR; INTRAVENOUS; SUBCUTANEOUS at 16:45

## 2019-12-23 RX ADMIN — HYDROMORPHONE HYDROCHLORIDE 0.5 MG: 1 INJECTION, SOLUTION INTRAMUSCULAR; INTRAVENOUS; SUBCUTANEOUS at 22:33

## 2019-12-23 ASSESSMENT — PAIN DESCRIPTION - ORIENTATION
ORIENTATION: LEFT

## 2019-12-23 ASSESSMENT — PAIN SCALES - GENERAL
PAINLEVEL_OUTOF10: 6
PAINLEVEL_OUTOF10: 6
PAINLEVEL_OUTOF10: 0
PAINLEVEL_OUTOF10: 5
PAINLEVEL_OUTOF10: 5
PAINLEVEL_OUTOF10: 3
PAINLEVEL_OUTOF10: 6
PAINLEVEL_OUTOF10: 5
PAINLEVEL_OUTOF10: 7
PAINLEVEL_OUTOF10: 3
PAINLEVEL_OUTOF10: 4
PAINLEVEL_OUTOF10: 3
PAINLEVEL_OUTOF10: 5

## 2019-12-23 ASSESSMENT — PAIN DESCRIPTION - PAIN TYPE
TYPE: ACUTE PAIN

## 2019-12-23 ASSESSMENT — PAIN DESCRIPTION - LOCATION
LOCATION: HIP

## 2019-12-23 ASSESSMENT — PAIN DESCRIPTION - DESCRIPTORS: DESCRIPTORS: SHARP

## 2019-12-23 NOTE — ED NOTES
Pt from home via [address], states she was sleeping in her chair in the living room when her  woke her up to come to bed and she tripped and fell. Pt states pain is 5/10 when she doesn't move. Dr. Ritu Block at bedside to evaluate patient.       Stephen Quigley RN  40/37/47 [id number]

## 2019-12-23 NOTE — PROGRESS NOTES
Unfortunate fall with hip fracture  Paroxysmal a. Fib with normal EF. She has been a poor candidae for anticoagulation due to advanced age and fall risk    Rec- proceed with surgical repair of hip fracture.  Cardiac risk acceptable  Start lopressor for rate control  Would start eliquis 2.5 mg bid whon safe from an orthopedic standpoint in the post operative period and would keep her on eliquis for 1 month before switching her back to aspirin  Agree with current DVT prophylaxis with lovenox

## 2019-12-23 NOTE — ED PROVIDER NOTES
nausea, vomiting, or diarrhea   Musculoskeletal:  Denies back pain, left hip pain  Skin:  Denies rash   Neurologic:  Denies headache, focal weakness or sensory changes   Endocrine:  Denies polyuria or polydypsia   Lymphatic:  Denies swollen glands   Psychiatric:  Denies depression, suicidal ideation or homicidal ideation   All systems negative except as marked. PHYSICAL EXAM    VITAL SIGNS: BP (!) 175/73   Pulse 93   Temp 97.7 °F (36.5 °C) (Oral)   Resp 22   Ht 5' 6\" (1.676 m)   Wt 143 lb (64.9 kg)   SpO2 96%   BMI 23.08 kg/m²    Constitutional:  Well developed, Well nourished, No acute distress, Non-toxic appearance. HENT:  Normocephalic, Atraumatic, Bilateral external ears normal, Oropharynx moist, No oral exudates, Nose normal. Neck- Normal range of motion, No tenderness, Supple, No stridor. Eyes:  PERRL, EOMI, Conjunctiva normal, No discharge. Respiratory:  Normal breath sounds, No respiratory distress, No wheezing, No chest tenderness. Cardiovascular:  Normal heart rate, Normal rhythm, No murmurs, No rubs, No gallops. GI:  Bowel sounds normal, Soft, No tenderness, No masses, No pulsatile masses. Musculoskeletal:  Intact distal pulses, No edema, No tenderness, No cyanosis, No clubbing. Good range of motion in all major joints. Left lower extremity is shortened and rotated. She has tenderness to the left hip particular with abduction. Back- No tenderness. Integument:  Warm, Dry, No erythema, No rash. Lymphatic:  No lymphadenopathy noted. Neurologic:  Alert & oriented x 3, Normal motor function, Normal sensory function, No focal deficits noted.    Psychiatric:  Affect normal, Judgment normal, Mood normal.     EKG        RADIOLOGY    CT HIP LEFT WO CONTRAST    (Results Pending)   XR CHEST PORTABLE    (Results Pending)     Labs Reviewed   CBC - Abnormal; Notable for the following components:       Result Value    WBC 3.9 (*)     All other components within normal limits    Narrative: Performed at:  [facility]  [address],  [place], [address]   Phone [phone]   COMPREHENSIVE METABOLIC PANEL W/ REFLEX TO MG FOR LOW K - Abnormal; Notable for the following components:    Glucose 121 (*)     BUN 22 (*)     GFR Non- 42 (*)     GFR  51 (*)     All other components within normal limits    Narrative:     Performed at:  [facility]  [address],  [place], [address]   Phone [phone]   PROTIME-INR    Narrative:     Performed at:  [facility]  [address],  [place], [address]   Phone [phone]         PROCEDURES        ED Mireya Peralta 630 studies reviewed. (See chart for details)  It took quite some time to get the read back for the CT. CT was ordered initially instead of plain because the patient was in a significant amount of pain, it is unlikely at this point pose risk for neoplastic disease, and I do not want her to have to get imaged twice. She would get the most information with 1 CT scan as opposed to being pain is more than that. I eventually was able to talk to the radiologist and he told me that she had a subcapital hip fracture on the left with some displacement. I relayed this information to the patient. We will try to keep her comfortable with IV analgesia here in the department. Her exam did not change. I ordered some preoperative testing as well such as a chest x-ray, which she refused, and EKG and lab work. I put in a page for the hospitalist for admission and I am awaiting a callback from Dr. Elma Schaumann orthopedic surgeon. He called back and just a few minutes, we discussed the case in full and he will see the patient. FINAL IMPRESSION    1.  Closed [facility] of left [facility]             Lauryn Groves MD  12/23/19 0558

## 2019-12-23 NOTE — PROGRESS NOTES
Pt admitted from ED after left hip fx. Pt is a/o, VSS. Dilaudid was given for pain prior to coming to unit.  with dementia at bedside, pt anxious about his care. Sister also at bedside. Updated POC with patient. Aware of surgery tomorrow.

## 2019-12-23 NOTE — ED NOTES
Report given to Hernando Garduno RN. Pt alert and oriented and shows no signs of distress at time of transfer to . Pt taken upstairs via bed by marko phillips and chris, tech. Romayne Fitch, RN  12/23/19 4927

## 2019-12-23 NOTE — CONSULTS
[facility] Orthopedic Surgery  Consult Note    Patient: Taylor Sanz  Admit Date: 12/23/2019  Requesting Physician: No admitting provider for patient encounter. Room: ED-0006/06    Chief complaint: LEFT hip pain    HPI: Taylor Sanz is a 80 y.o. female who presented to [facility] today with c/o LEFT hip pain. She was getting out of her chair last night, lost her balance, and fell onto her right side. Denies other injuries. States she cares for her  at home who has dementia. States she has atrial fibrillation, but was taken off Xarelto several years ago. She states her and her  were planning to move to a senior community soon. She also states she has a loose, front lower tooth for which she had a dentist appointment scheduled for today. PMH includes anxiety, atrial fibrillation, HTN, peripheral neuropathy, spinal stenosis. Describes pain in LEFT hip of severe intensity and of sharp nature when she moves since the fall which is relieved by medication and immobility. Denies new numbness/tingling. Imaging review of CT LEFT hip demonstrated:   Fracture of the left femoral head/neck junction.  Superior displacement of   the distal fracture fragment by approximately 2 cm.      Medical History:  Past Medical History:   Diagnosis Date    Anxiety     Atrial fibrillation (HCC)     Disorder of bone and cartilage, unspecified     Fatigue     Hypertension     Migraine, unspecified, without mention of intractable migraine without mention of status migrainosus     Other disorders of kidney and ureter     Peripheral neuropathy 4/17/2015    Psychiatric problem     Risk for falls 5/20/14    Spinal stenosis     Thyroid disease     Unspecified glaucoma(365.9)     Unspecified hearing loss      Past Surgical History:   Procedure Laterality Date    APPENDECTOMY      CATARACT REMOVAL Bilateral 04/2017    COLON SURGERY      EYE SURGERY      KIDNEY SURGERY      OVARY REMOVAL

## 2019-12-23 NOTE — ANESTHESIA PRE PROCEDURE
Department of Anesthesiology  Preprocedure Note       Name:  Rodney Gallagher   Age:  80 y.o.  :  1928                                          MRN:  [id number]         Date:  2019      Surgeon: Sarah Butts):  Beto Lopez MD    Procedure: LEFT HIP HEMIARTHROPLASTY (Left Hip)    Medications prior to admission:   Prior to Admission medications    Medication Sig Start Date End Date Taking?  Authorizing Provider   LORazepam (ATIVAN) 0.5 MG tablet TAKE ONE TABLET BY MOUTH THREE TIMES A DAY 19 Yes LEILANI Bacon - CNP   levothyroxine (SYNTHROID) 50 MCG tablet Take 1 tablet by mouth daily 19  Yes Mylene Garg MD   vitamin B-12 (CYANOCOBALAMIN) 1000 MCG tablet Take 2,000 mcg by mouth daily   Yes Historical Provider, MD   aspirin 81 MG tablet Take 81 mg by mouth daily    Yes Historical Provider, MD   Multiple Vitamins-Minerals (THERAPEUTIC MULTIVITAMIN-MINERALS) tablet Take 1 tablet by mouth every other day   Yes Historical Provider, MD       Current medications:    Current Facility-Administered Medications   Medication Dose Route Frequency Provider Last Rate Last Dose    aspirin chewable tablet 81 mg  81 mg Oral Daily Radha Harden MD        levothyroxine (SYNTHROID) tablet 50 mcg  50 mcg Oral QAM AC Radha Harden MD        LORazepam (ATIVAN) tablet 0.5 mg  0.5 mg Oral Q8H PRN Radha Harden MD   0.5 mg at 19 1253    sodium chloride flush 0.9 % injection 10 mL  10 mL Intravenous 2 times per day Radha Harden MD   10 mL at 19 1230    sodium chloride flush 0.9 % injection 10 mL  10 mL Intravenous PRN Radha Harden MD        ondansetron (ZOFRAN) injection 4 mg  4 mg Intravenous Q6H PRN Radha Harden MD        polyethylene glycol (GLYCOLAX) packet 17 g  17 g Oral Daily PRN Radha Harden MD        acetaminophen (TYLENOL) tablet 650 mg  650 mg Oral Q4H PRN Radha Harden MD        HYDROcodone-acetaminophen (NORCO) 5-325 MG per tablet 1 tablet  1 migrainosus     Other disorders of kidney and ureter     Peripheral neuropathy 4/17/2015    Psychiatric problem     Risk for falls 5/20/14    Spinal stenosis     Thyroid disease     Unspecified glaucoma(365.9)     Unspecified hearing loss        Past Surgical History:        Procedure Laterality Date    APPENDECTOMY      CATARACT REMOVAL Bilateral 04/2017    COLON SURGERY      COLONOSCOPY      EYE SURGERY      KIDNEY SURGERY      OVARY REMOVAL         Social History:    Social History     Tobacco Use    Smoking status: Never Smoker    Smokeless tobacco: Never Used   Substance Use Topics    Alcohol use: No     Alcohol/week: 0.0 standard drinks                                Counseling given: Not Answered      Vital Signs (Current):   Vitals:    12/23/19 0800 12/23/19 0830 12/23/19 0959 12/23/19 1300   BP: (!) 145/62 (!) 146/63 128/77 124/78   Pulse: 94 91  77   Resp: 19 21 18   Temp:    36.6 °C (97.8 °F)   TempSrc:    Temporal   SpO2: 94% 96%  96%   Weight:   158 lb 15.2 oz (72.1 kg)    Height:   5' 6\" (1.676 m)                                               BP Readings from Last 3 Encounters:   12/23/19 124/78   11/19/19 132/72   07/20/19 138/70       NPO Status:                                                                                 BMI:   Wt Readings from Last 3 Encounters:   12/23/19 158 lb 15.2 oz (72.1 kg)   11/19/19 145 lb 8 oz (66 kg)   07/20/19 145 lb (65.8 kg)     Body mass index is 25.66 kg/m².     CBC:   Lab Results   Component Value Date    WBC 3.9 12/23/2019    RBC 4.02 12/23/2019    HGB 13.1 12/23/2019    HCT 38.4 12/23/2019    MCV 95.6 12/23/2019    RDW 12.9 12/23/2019     12/23/2019       CMP:   Lab Results   Component Value Date     12/23/2019    K 4.0 12/23/2019     12/23/2019    CO2 24 12/23/2019    BUN 22 12/23/2019    CREATININE 1.2 12/23/2019    GFRAA 51 12/23/2019    GFRAA >60 05/09/2013    AGRATIO 1.2 12/23/2019    LABGLOM 42 12/23/2019    GLUCOSE 121 12/23/2019    PROT 6.9 12/23/2019    PROT 8.0 10/17/2012    CALCIUM 9.2 12/23/2019    BILITOT 0.4 12/23/2019    ALKPHOS 111 12/23/2019    AST 17 12/23/2019    ALT 15 12/23/2019       POC Tests: No results for input(s): POCGLU, POCNA, POCK, POCCL, POCBUN, POCHEMO, POCHCT in the last 72 hours. Coags:   Lab Results   Component Value Date    PROTIME 11.5 12/23/2019    INR 0.99 12/23/2019    APTT 31.9 05/27/2019       HCG (If Applicable): No results found for: PREGTESTUR, PREGSERUM, HCG, HCGQUANT     ABGs:   Lab Results   Component Value Date    BEART 0.9 06/28/2017        Type & Screen (If Applicable):  No results found for: LABABO, [address]    Anesthesia Evaluation  Patient summary reviewed and Nursing notes reviewed  Airway: Mallampati: II        Dental:    (+) edentulous and other  Comment: PT states loose bottom tooth    Pulmonary:Negative Pulmonary ROS and normal exam                               Cardiovascular:  Exercise tolerance: good (>4 METS),   (+) hypertension: no interval change, dysrhythmias: atrial fibrillation, hyperlipidemia      ECG reviewed  Rhythm: irregular  Rate: abnormal  Echocardiogram reviewed  Stress test reviewed       Beta Blocker:  Dose within 24 Hrs      ROS comment: Stress Echo:  02/29/2016     Conclusions      Summary   -Target heart rate not achieved. -Normal stress echocardiogram study. Poor exercise capacity, target rate achieved. Atrial fibrillation throughout the protocol. EF 55%     Neuro/Psych:   (+) neuromuscular disease:, headaches: migraine headaches, psychiatric history: stable with treatmentdepression/anxiety             GI/Hepatic/Renal: Neg GI/Hepatic/Renal ROS            Endo/Other:    (+) hypothyroidism, blood dyscrasia: anemia, arthritis: OA., .                 Abdominal:       Abdomen: soft. Vascular: negative vascular ROS.                                    Anesthesia Plan      general and regional     ASA 3       Induction: intravenous and rapid (DILAUDID) injection 0.5 mg  0.5 mg Intravenous Q3H PRN Osmar Jensen MD   0.5 mg at 12/24/19 0146    metoprolol tartrate (LOPRESSOR) tablet 25 mg  25 mg Oral BID Oscar Mahmood MD   25 mg at 12/23/19 1949        LABS:  Lab Results   Component Value Date    WBC 3.9 (L) 12/23/2019    HGB 13.1 12/23/2019    HCT 38.4 12/23/2019    MCV 95.6 12/23/2019     12/23/2019    GLUCOSE 121 (H) 12/23/2019    PROTIME 11.5 12/23/2019    INR 0.99 12/23/2019    APTT 31.9 05/27/2019       Lab Results   Component Value Date     12/23/2019    K 4.0 12/23/2019     12/23/2019    CO2 24 12/23/2019    PHOS 3.2 09/22/2016    BUN 22 (H) 12/23/2019    CREATININE 1.2 12/23/2019       Problem List:  Patient Active Problem List   Diagnosis    Hypertension    Palpitations    Fatigue    Atrial fibrillation (HCC)    Anxiety state    Glaucoma    Migraine    Hypothyroid    Imbalance    OA (osteoarthritis) of knee    Weakness of left foot    Ataxia    Peripheral neuropathy    Hearing loss    Constipation due to outlet dysfunction    Acquired hypothyroidism    Iron deficiency anemia due to chronic blood loss    Soreness of tongue    Disequilibrium    Bilateral sensorineural hearing loss    Weight loss    Contusion of left side of back    Chronic fatigue    Psychophysiological insomnia    Hyperglycemia    [facility]         Heather Tejada, APRN - CRNA   12/23/2019

## 2019-12-24 ENCOUNTER — ANESTHESIA (OUTPATIENT)
Dept: OPERATING ROOM | Age: 84
DRG: 470 | End: 2019-12-24
Payer: MEDICARE

## 2019-12-24 ENCOUNTER — APPOINTMENT (OUTPATIENT)
Dept: GENERAL RADIOLOGY | Age: 84
DRG: 470 | End: 2019-12-24
Payer: MEDICARE

## 2019-12-24 VITALS
RESPIRATION RATE: 3 BRPM | SYSTOLIC BLOOD PRESSURE: 155 MMHG | DIASTOLIC BLOOD PRESSURE: 70 MMHG | TEMPERATURE: 97.9 F | OXYGEN SATURATION: 94 %

## 2019-12-24 LAB
ABO/RH: NORMAL
ANTIBODY SCREEN: NORMAL
TSH REFLEX: 2.62 UIU/ML (ref 0.27–4.2)

## 2019-12-24 PROCEDURE — 6360000002 HC RX W HCPCS: Performed by: ANESTHESIOLOGY

## 2019-12-24 PROCEDURE — 86850 RBC ANTIBODY SCREEN: CPT

## 2019-12-24 PROCEDURE — 2580000003 HC RX 258: Performed by: ORTHOPAEDIC SURGERY

## 2019-12-24 PROCEDURE — 3700000000 HC ANESTHESIA ATTENDED CARE: Performed by: ORTHOPAEDIC SURGERY

## 2019-12-24 PROCEDURE — 7100000001 HC PACU RECOVERY - ADDTL 15 MIN: Performed by: ORTHOPAEDIC SURGERY

## 2019-12-24 PROCEDURE — 2500000003 HC RX 250 WO HCPCS: Performed by: NURSE ANESTHETIST, CERTIFIED REGISTERED

## 2019-12-24 PROCEDURE — 6360000002 HC RX W HCPCS: Performed by: ORTHOPAEDIC SURGERY

## 2019-12-24 PROCEDURE — 6360000002 HC RX W HCPCS: Performed by: NURSE ANESTHETIST, CERTIFIED REGISTERED

## 2019-12-24 PROCEDURE — 2580000003 HC RX 258: Performed by: HOSPITALIST

## 2019-12-24 PROCEDURE — 6360000002 HC RX W HCPCS

## 2019-12-24 PROCEDURE — 27236 TREAT THIGH FRACTURE: CPT | Performed by: ORTHOPAEDIC SURGERY

## 2019-12-24 PROCEDURE — 94760 N-INVAS EAR/PLS OXIMETRY 1: CPT

## 2019-12-24 PROCEDURE — 86900 BLOOD TYPING SEROLOGIC ABO: CPT

## 2019-12-24 PROCEDURE — 2700000000 HC OXYGEN THERAPY PER DAY

## 2019-12-24 PROCEDURE — 2580000003 HC RX 258: Performed by: NURSE PRACTITIONER

## 2019-12-24 PROCEDURE — 2500000003 HC RX 250 WO HCPCS: Performed by: ORTHOPAEDIC SURGERY

## 2019-12-24 PROCEDURE — 1200000000 HC SEMI PRIVATE

## 2019-12-24 PROCEDURE — 94150 VITAL CAPACITY TEST: CPT

## 2019-12-24 PROCEDURE — 3700000001 HC ADD 15 MINUTES (ANESTHESIA): Performed by: ORTHOPAEDIC SURGERY

## 2019-12-24 PROCEDURE — 2709999900 HC NON-CHARGEABLE SUPPLY: Performed by: ORTHOPAEDIC SURGERY

## 2019-12-24 PROCEDURE — 84443 ASSAY THYROID STIM HORMONE: CPT

## 2019-12-24 PROCEDURE — 6370000000 HC RX 637 (ALT 250 FOR IP): Performed by: ORTHOPAEDIC SURGERY

## 2019-12-24 PROCEDURE — 2500000003 HC RX 250 WO HCPCS: Performed by: INTERNAL MEDICINE

## 2019-12-24 PROCEDURE — 2580000003 HC RX 258: Performed by: NURSE ANESTHETIST, CERTIFIED REGISTERED

## 2019-12-24 PROCEDURE — 72170 X-RAY EXAM OF PELVIS: CPT

## 2019-12-24 PROCEDURE — 6370000000 HC RX 637 (ALT 250 FOR IP): Performed by: INTERNAL MEDICINE

## 2019-12-24 PROCEDURE — 7100000000 HC PACU RECOVERY - FIRST 15 MIN: Performed by: ORTHOPAEDIC SURGERY

## 2019-12-24 PROCEDURE — 6370000000 HC RX 637 (ALT 250 FOR IP): Performed by: HOSPITALIST

## 2019-12-24 PROCEDURE — 3600000005 HC SURGERY LEVEL 5 BASE: Performed by: ORTHOPAEDIC SURGERY

## 2019-12-24 PROCEDURE — C1776 JOINT DEVICE (IMPLANTABLE): HCPCS | Performed by: ORTHOPAEDIC SURGERY

## 2019-12-24 PROCEDURE — 0SRS0JZ REPLACEMENT OF LEFT HIP JOINT, FEMORAL SURFACE WITH SYNTHETIC SUBSTITUTE, OPEN APPROACH: ICD-10-PCS | Performed by: ORTHOPAEDIC SURGERY

## 2019-12-24 PROCEDURE — 3600000015 HC SURGERY LEVEL 5 ADDTL 15MIN: Performed by: ORTHOPAEDIC SURGERY

## 2019-12-24 PROCEDURE — 6360000002 HC RX W HCPCS: Performed by: HOSPITALIST

## 2019-12-24 PROCEDURE — 86901 BLOOD TYPING SEROLOGIC RH(D): CPT

## 2019-12-24 PROCEDURE — 36415 COLL VENOUS BLD VENIPUNCTURE: CPT

## 2019-12-24 DEVICE — AVENIR MÜLLER STEM 5 STANDARD
Type: IMPLANTABLE DEVICE | Site: HIP | Status: FUNCTIONAL
Brand: AVENIR® MÜLLER

## 2019-12-24 DEVICE — HEAD FEMORAL COCR 12/14 28MM -3.5: Type: IMPLANTABLE DEVICE | Site: HIP | Status: FUNCTIONAL

## 2019-12-24 DEVICE — BIPOLAR SHELL 45MM OD: Type: IMPLANTABLE DEVICE | Site: HIP | Status: FUNCTIONAL

## 2019-12-24 DEVICE — BIPOLAR LINER 44/45/46MM OD X 28MM ID: Type: IMPLANTABLE DEVICE | Site: HIP | Status: FUNCTIONAL

## 2019-12-24 RX ORDER — BUPIVACAINE HYDROCHLORIDE 2.5 MG/ML
INJECTION, SOLUTION EPIDURAL; INFILTRATION; INTRACAUDAL
Status: COMPLETED | OUTPATIENT
Start: 2019-12-24 | End: 2019-12-24

## 2019-12-24 RX ORDER — GLYCOPYRROLATE 1 MG/5 ML
SYRINGE (ML) INTRAVENOUS PRN
Status: DISCONTINUED | OUTPATIENT
Start: 2019-12-24 | End: 2019-12-24 | Stop reason: SDUPTHER

## 2019-12-24 RX ORDER — SODIUM CHLORIDE 9 MG/ML
INJECTION, SOLUTION INTRAVENOUS CONTINUOUS
Status: DISCONTINUED | OUTPATIENT
Start: 2019-12-24 | End: 2019-12-24

## 2019-12-24 RX ORDER — MAGNESIUM HYDROXIDE 1200 MG/15ML
LIQUID ORAL CONTINUOUS PRN
Status: COMPLETED | OUTPATIENT
Start: 2019-12-24 | End: 2019-12-24

## 2019-12-24 RX ORDER — MEPERIDINE HYDROCHLORIDE 25 MG/ML
12.5 INJECTION INTRAMUSCULAR; INTRAVENOUS; SUBCUTANEOUS EVERY 5 MIN PRN
Status: DISCONTINUED | OUTPATIENT
Start: 2019-12-24 | End: 2019-12-24 | Stop reason: HOSPADM

## 2019-12-24 RX ORDER — DEXAMETHASONE SODIUM PHOSPHATE 4 MG/ML
INJECTION, SOLUTION INTRA-ARTICULAR; INTRALESIONAL; INTRAMUSCULAR; INTRAVENOUS; SOFT TISSUE PRN
Status: DISCONTINUED | OUTPATIENT
Start: 2019-12-24 | End: 2019-12-24 | Stop reason: SDUPTHER

## 2019-12-24 RX ORDER — SODIUM CHLORIDE 0.9 % (FLUSH) 0.9 %
10 SYRINGE (ML) INJECTION EVERY 12 HOURS SCHEDULED
Status: DISCONTINUED | OUTPATIENT
Start: 2019-12-24 | End: 2020-01-01 | Stop reason: HOSPADM

## 2019-12-24 RX ORDER — ONDANSETRON 2 MG/ML
INJECTION INTRAMUSCULAR; INTRAVENOUS PRN
Status: DISCONTINUED | OUTPATIENT
Start: 2019-12-24 | End: 2019-12-24 | Stop reason: SDUPTHER

## 2019-12-24 RX ORDER — SODIUM CHLORIDE 9 MG/ML
INJECTION, SOLUTION INTRAVENOUS CONTINUOUS
Status: DISCONTINUED | OUTPATIENT
Start: 2019-12-24 | End: 2019-12-27

## 2019-12-24 RX ORDER — SUCCINYLCHOLINE CHLORIDE 20 MG/ML
INJECTION INTRAMUSCULAR; INTRAVENOUS PRN
Status: DISCONTINUED | OUTPATIENT
Start: 2019-12-24 | End: 2019-12-24 | Stop reason: SDUPTHER

## 2019-12-24 RX ORDER — EPHEDRINE SULFATE/0.9% NACL/PF 50 MG/5 ML
SYRINGE (ML) INTRAVENOUS PRN
Status: DISCONTINUED | OUTPATIENT
Start: 2019-12-24 | End: 2019-12-24 | Stop reason: SDUPTHER

## 2019-12-24 RX ORDER — NEOSTIGMINE METHYLSULFATE 5 MG/5 ML
SYRINGE (ML) INTRAVENOUS PRN
Status: DISCONTINUED | OUTPATIENT
Start: 2019-12-24 | End: 2019-12-24 | Stop reason: SDUPTHER

## 2019-12-24 RX ORDER — PROPOFOL 10 MG/ML
INJECTION, EMULSION INTRAVENOUS PRN
Status: DISCONTINUED | OUTPATIENT
Start: 2019-12-24 | End: 2019-12-24 | Stop reason: SDUPTHER

## 2019-12-24 RX ORDER — ONDANSETRON 2 MG/ML
4 INJECTION INTRAMUSCULAR; INTRAVENOUS EVERY 6 HOURS PRN
Status: DISCONTINUED | OUTPATIENT
Start: 2019-12-24 | End: 2020-01-01 | Stop reason: HOSPADM

## 2019-12-24 RX ORDER — HYDROMORPHONE HCL 110MG/55ML
0.5 PATIENT CONTROLLED ANALGESIA SYRINGE INTRAVENOUS EVERY 5 MIN PRN
Status: COMPLETED | OUTPATIENT
Start: 2019-12-24 | End: 2019-12-24

## 2019-12-24 RX ORDER — LABETALOL HYDROCHLORIDE 5 MG/ML
5 INJECTION, SOLUTION INTRAVENOUS EVERY 10 MIN PRN
Status: DISCONTINUED | OUTPATIENT
Start: 2019-12-24 | End: 2019-12-24 | Stop reason: HOSPADM

## 2019-12-24 RX ORDER — DOCUSATE SODIUM 100 MG/1
100 CAPSULE, LIQUID FILLED ORAL 2 TIMES DAILY
Status: DISCONTINUED | OUTPATIENT
Start: 2019-12-24 | End: 2020-01-01 | Stop reason: HOSPADM

## 2019-12-24 RX ORDER — OXYCODONE HYDROCHLORIDE AND ACETAMINOPHEN 5; 325 MG/1; MG/1
1 TABLET ORAL
Status: DISCONTINUED | OUTPATIENT
Start: 2019-12-24 | End: 2019-12-24 | Stop reason: HOSPADM

## 2019-12-24 RX ORDER — CEFAZOLIN SODIUM 2 G/100ML
2 INJECTION, SOLUTION INTRAVENOUS ONCE
Status: DISCONTINUED | OUTPATIENT
Start: 2019-12-24 | End: 2019-12-24

## 2019-12-24 RX ORDER — SENNA AND DOCUSATE SODIUM 50; 8.6 MG/1; MG/1
1 TABLET, FILM COATED ORAL 2 TIMES DAILY
Status: DISCONTINUED | OUTPATIENT
Start: 2019-12-24 | End: 2020-01-01 | Stop reason: HOSPADM

## 2019-12-24 RX ORDER — SODIUM CHLORIDE 9 MG/ML
INJECTION, SOLUTION INTRAVENOUS CONTINUOUS PRN
Status: DISCONTINUED | OUTPATIENT
Start: 2019-12-24 | End: 2019-12-24 | Stop reason: SDUPTHER

## 2019-12-24 RX ORDER — ROCURONIUM BROMIDE 10 MG/ML
INJECTION, SOLUTION INTRAVENOUS PRN
Status: DISCONTINUED | OUTPATIENT
Start: 2019-12-24 | End: 2019-12-24 | Stop reason: SDUPTHER

## 2019-12-24 RX ORDER — SODIUM CHLORIDE 0.9 % (FLUSH) 0.9 %
10 SYRINGE (ML) INJECTION PRN
Status: DISCONTINUED | OUTPATIENT
Start: 2019-12-24 | End: 2020-01-01 | Stop reason: HOSPADM

## 2019-12-24 RX ORDER — HYDRALAZINE HYDROCHLORIDE 20 MG/ML
5 INJECTION INTRAMUSCULAR; INTRAVENOUS EVERY 10 MIN PRN
Status: DISCONTINUED | OUTPATIENT
Start: 2019-12-24 | End: 2019-12-24 | Stop reason: HOSPADM

## 2019-12-24 RX ORDER — LIDOCAINE HYDROCHLORIDE 20 MG/ML
INJECTION, SOLUTION EPIDURAL; INFILTRATION; INTRACAUDAL; PERINEURAL PRN
Status: DISCONTINUED | OUTPATIENT
Start: 2019-12-24 | End: 2019-12-24 | Stop reason: SDUPTHER

## 2019-12-24 RX ORDER — ONDANSETRON 2 MG/ML
4 INJECTION INTRAMUSCULAR; INTRAVENOUS
Status: COMPLETED | OUTPATIENT
Start: 2019-12-24 | End: 2019-12-24

## 2019-12-24 RX ADMIN — Medication 10 MG: at 12:55

## 2019-12-24 RX ADMIN — LORAZEPAM 0.5 MG: 0.5 TABLET ORAL at 20:02

## 2019-12-24 RX ADMIN — HYDROMORPHONE HYDROCHLORIDE 0.5 MG: 2 INJECTION, SOLUTION INTRAMUSCULAR; INTRAVENOUS; SUBCUTANEOUS at 14:46

## 2019-12-24 RX ADMIN — TRANEXAMIC ACID 1000 MG: 1 INJECTION, SOLUTION INTRAVENOUS at 12:40

## 2019-12-24 RX ADMIN — HYDROMORPHONE HYDROCHLORIDE 0.5 MG: 2 INJECTION, SOLUTION INTRAMUSCULAR; INTRAVENOUS; SUBCUTANEOUS at 14:41

## 2019-12-24 RX ADMIN — Medication 10 ML: at 01:46

## 2019-12-24 RX ADMIN — HYDROMORPHONE HYDROCHLORIDE 0.5 MG: 1 INJECTION, SOLUTION INTRAMUSCULAR; INTRAVENOUS; SUBCUTANEOUS at 08:05

## 2019-12-24 RX ADMIN — HYDROMORPHONE HYDROCHLORIDE 0.5 MG: 2 INJECTION, SOLUTION INTRAMUSCULAR; INTRAVENOUS; SUBCUTANEOUS at 14:51

## 2019-12-24 RX ADMIN — Medication 3 MG: at 14:05

## 2019-12-24 RX ADMIN — HYDROMORPHONE HYDROCHLORIDE 0.5 MG: 1 INJECTION, SOLUTION INTRAMUSCULAR; INTRAVENOUS; SUBCUTANEOUS at 01:46

## 2019-12-24 RX ADMIN — DEXAMETHASONE SODIUM PHOSPHATE 4 MG: 4 INJECTION, SOLUTION INTRAMUSCULAR; INTRAVENOUS at 12:46

## 2019-12-24 RX ADMIN — Medication: at 12:24

## 2019-12-24 RX ADMIN — SUCCINYLCHOLINE CHLORIDE 140 MG: 20 INJECTION, SOLUTION INTRAMUSCULAR; INTRAVENOUS at 12:37

## 2019-12-24 RX ADMIN — LEVOTHYROXINE SODIUM 50 MCG: 0.03 TABLET ORAL at 09:52

## 2019-12-24 RX ADMIN — Medication 10 MG: at 13:47

## 2019-12-24 RX ADMIN — ONDANSETRON 4 MG: 2 INJECTION INTRAMUSCULAR; INTRAVENOUS at 12:46

## 2019-12-24 RX ADMIN — METOPROLOL TARTRATE 25 MG: 25 TABLET, FILM COATED ORAL at 09:51

## 2019-12-24 RX ADMIN — Medication 0.4 MG: at 14:05

## 2019-12-24 RX ADMIN — TRANEXAMIC ACID 1 G: 1 INJECTION, SOLUTION INTRAVENOUS at 13:47

## 2019-12-24 RX ADMIN — ONDANSETRON 4 MG: 2 INJECTION INTRAMUSCULAR; INTRAVENOUS at 14:28

## 2019-12-24 RX ADMIN — PROPOFOL 100 MG: 10 INJECTION, EMULSION INTRAVENOUS at 12:37

## 2019-12-24 RX ADMIN — Medication 10 MG: at 13:20

## 2019-12-24 RX ADMIN — Medication 10 ML: at 09:51

## 2019-12-24 RX ADMIN — SODIUM CHLORIDE: 9 INJECTION, SOLUTION INTRAVENOUS at 09:51

## 2019-12-24 RX ADMIN — SODIUM CHLORIDE: 9 INJECTION, SOLUTION INTRAVENOUS at 15:29

## 2019-12-24 RX ADMIN — SODIUM CHLORIDE: 9 INJECTION, SOLUTION INTRAVENOUS at 12:33

## 2019-12-24 RX ADMIN — SODIUM CHLORIDE: 9 INJECTION, SOLUTION INTRAVENOUS at 14:46

## 2019-12-24 RX ADMIN — HYDROMORPHONE HYDROCHLORIDE 0.5 MG: 1 INJECTION, SOLUTION INTRAMUSCULAR; INTRAVENOUS; SUBCUTANEOUS at 10:56

## 2019-12-24 RX ADMIN — PROPOFOL 50 MG: 10 INJECTION, EMULSION INTRAVENOUS at 12:43

## 2019-12-24 RX ADMIN — BUPIVACAINE HYDROCHLORIDE 30 ML: 2.5 INJECTION, SOLUTION EPIDURAL; INFILTRATION; INTRACAUDAL; PERINEURAL at 12:24

## 2019-12-24 RX ADMIN — HYDROMORPHONE HYDROCHLORIDE 0.5 MG: 2 INJECTION, SOLUTION INTRAMUSCULAR; INTRAVENOUS; SUBCUTANEOUS at 14:30

## 2019-12-24 RX ADMIN — ROCURONIUM BROMIDE 20 MG: 10 INJECTION, SOLUTION INTRAVENOUS at 12:50

## 2019-12-24 RX ADMIN — Medication 2 G: at 22:47

## 2019-12-24 RX ADMIN — LIDOCAINE HYDROCHLORIDE 100 MG: 20 INJECTION, SOLUTION EPIDURAL; INFILTRATION; INTRACAUDAL; PERINEURAL at 12:37

## 2019-12-24 ASSESSMENT — PAIN SCALES - GENERAL
PAINLEVEL_OUTOF10: 7
PAINLEVEL_OUTOF10: 10
PAINLEVEL_OUTOF10: 4
PAINLEVEL_OUTOF10: 8
PAINLEVEL_OUTOF10: 7
PAINLEVEL_OUTOF10: 8
PAINLEVEL_OUTOF10: 7
PAINLEVEL_OUTOF10: 8

## 2019-12-24 ASSESSMENT — PULMONARY FUNCTION TESTS
PIF_VALUE: 16
PIF_VALUE: 17
PIF_VALUE: 16
PIF_VALUE: 15
PIF_VALUE: 16
PIF_VALUE: 17
PIF_VALUE: 17
PIF_VALUE: 27
PIF_VALUE: 14
PIF_VALUE: 3
PIF_VALUE: 16
PIF_VALUE: 12
PIF_VALUE: 3
PIF_VALUE: 17
PIF_VALUE: 17
PIF_VALUE: 3
PIF_VALUE: 3
PIF_VALUE: 16
PIF_VALUE: 17
PIF_VALUE: 16
PIF_VALUE: 15
PIF_VALUE: 1
PIF_VALUE: 17
PIF_VALUE: 17
PIF_VALUE: 16
PIF_VALUE: 14
PIF_VALUE: 16
PIF_VALUE: 16
PIF_VALUE: 17
PIF_VALUE: 16
PIF_VALUE: 17
PIF_VALUE: 16
PIF_VALUE: 1
PIF_VALUE: 17
PIF_VALUE: 16
PIF_VALUE: 15
PIF_VALUE: 16
PIF_VALUE: 16
PIF_VALUE: 15
PIF_VALUE: 17
PIF_VALUE: 3
PIF_VALUE: 16
PIF_VALUE: 17
PIF_VALUE: 17
PIF_VALUE: 4
PIF_VALUE: 15
PIF_VALUE: 16
PIF_VALUE: 16
PIF_VALUE: 3
PIF_VALUE: 15
PIF_VALUE: 15
PIF_VALUE: 17
PIF_VALUE: 16
PIF_VALUE: 15
PIF_VALUE: 15
PIF_VALUE: 17
PIF_VALUE: 15
PIF_VALUE: 16
PIF_VALUE: 15
PIF_VALUE: 16
PIF_VALUE: 16
PIF_VALUE: 17
PIF_VALUE: 15
PIF_VALUE: 15
PIF_VALUE: 3
PIF_VALUE: 4
PIF_VALUE: 16
PIF_VALUE: 17
PIF_VALUE: 6
PIF_VALUE: 17
PIF_VALUE: 23
PIF_VALUE: 15
PIF_VALUE: 15
PIF_VALUE: 16
PIF_VALUE: 16
PIF_VALUE: 1
PIF_VALUE: 15
PIF_VALUE: 15
PIF_VALUE: 14
PIF_VALUE: 16
PIF_VALUE: 15
PIF_VALUE: 16
PIF_VALUE: 17
PIF_VALUE: 17
PIF_VALUE: 16
PIF_VALUE: 2
PIF_VALUE: 16
PIF_VALUE: 2
PIF_VALUE: 17
PIF_VALUE: 16
PIF_VALUE: 17
PIF_VALUE: 16
PIF_VALUE: 16
PIF_VALUE: 3

## 2019-12-24 ASSESSMENT — PAIN SCALES - WONG BAKER
WONGBAKER_NUMERICALRESPONSE: 0

## 2019-12-24 ASSESSMENT — PAIN DESCRIPTION - LOCATION
LOCATION: HIP
LOCATION: HIP

## 2019-12-24 ASSESSMENT — PAIN DESCRIPTION - ORIENTATION
ORIENTATION: LEFT
ORIENTATION: LEFT

## 2019-12-24 ASSESSMENT — PAIN DESCRIPTION - DESCRIPTORS
DESCRIPTORS: SHARP
DESCRIPTORS: DISCOMFORT

## 2019-12-24 ASSESSMENT — PAIN DESCRIPTION - PAIN TYPE
TYPE: ACUTE PAIN
TYPE: SURGICAL PAIN
TYPE: ACUTE PAIN

## 2019-12-24 NOTE — CONSULTS
[facility]                     [facility], [address]                                  CONSULTATION    PATIENT NAME: Darrell Orozco                 :        1928  MED REC NO:   [id number]                          ROOM:       1  ACCOUNT NO:   [de-identified]                           ADMIT DATE: 2019  PROVIDER:     Sandip Cardoza MD    CONSULT DATE:  2019    REASON FOR CONSULTATION:  Requested by [facility]opedics [facility] Hospital service  to clear for hip fracture repair. HISTORY OF PRESENT ILLNESS:  The patient is a 80-year-old lady who I  followed for intermittent dizziness, no signs of congestive heart  failure. She has paroxysmal atrial fibrillation. She is not felt to be  a good candidate for anticoagulation due to fall risk and advanced age. She is known to have intermittent atrial fibrillation. She has not been  anymore short of breath. She last had cardiac evaluation approximately  4-5 years ago when she had normal left ventricular ejection fraction. She was sitting in a chair. When she was awakened from sleep, she got  up to go to bed and she had an unfortunate fall. This resulted in  severe left hip pain and a left hip fracture. She currently denies  shortness of breath, denies chest discomfort. PAST MEDICAL HISTORY:  Notable for history of paroxysmal atrial  fibrillation, history of hypertension, history of migraine, history of  peripheral neuropathy, history of thyroid disease with hypothyroidism. PAST SURGICAL HISTORY:  Includes cataract surgery, colon surgery. She  had appendectomy, ovary removal.    MEDICATIONS:  Ativan t.i.d., Synthroid 50 mcg daily, vitamin B12,  aspirin, multivitamin. ALLERGIES:  ZOLOFT, SERTRALINE, MIRTAZAPINE. SOCIAL HISTORY:  The patient is on a considerable stress due to her  's advanced dementia. She has never been a cigarette user. She  does not drink alcohol.     FAMILY HISTORY:  Notable for mother with unspecified heart disease,  stroke in her father, unspecified heart disease in her father. REVIEW OF SYSTEMS:  A 14-system review of systems is done and is  otherwise negative. She is quite tearful over her current hip fracture  although her pain is well controlled. PHYSICAL EXAMINATION:  GENERAL:  Currently on physical examination, she appears comfortable at  rest.  VITAL SIGNS:  Blood pressure 120/77, pulse rate is 100-120. She does  have paroxysmal atrial fibrillation. HEENT:  Sclerae are clear. Posterior pharynx clear. SKIN:  Warm and dry. LUNGS:  Lung fields are clear. CARDIAC:  Cardiac sounds are notable for _____ rhythm. ABDOMEN:  Soft, nontender. EXTREMITIES:  Tenderness over the left hip area. No peripheral edema. NEUROLOGIC:  Moves all extremities well. Cranial nerves II through XII  intact. LABORATORY DATA:  Labs are reviewed. DIAGNOSTIC DATA:  EKG shows atrial fibrillation with occasional sinus  beats. Chest x-ray is unremarkable. IMPRESSION:  This is a preoperative clearance for a patient who has  paroxysmal atrial fibrillation with healthy cardiac status in the past.   There is no reason to suspect acute congestive heart failure or acute  coronary syndrome. The patient is as stable as possible for anticipated  surgery. She does have increased morbidity due to her advanced age. The patient has paroxysmal atrial fibrillation, for that reason would  start Lopressor 25 mg b.i.d. Would maintain DVT prophylaxis with  Lovenox. In the postoperative period, would favor the use of Eliquis  2.5 mg b.i.d. for both DVT prophylaxis and stroke prevention. Would  likely continue that for approximately one month. If she returns to her  home situation, I feel it would be best to maintain her off Eliquis due  to ongoing fall risk.     Please note that 70 minutes of spent was spent with majority of the time  with direct patient contact, performing this

## 2019-12-24 NOTE — OP NOTE
DATE OF SURGERY: 12/24/19    PREOPERATIVE DIAGNOSIS: Left femoral neck fracture. POSTOPERATIVE DIAGNOSIS: Left femoral neck fracture. PROCEDURE: Left hip hemiarthroplasty. SURGEON: Ty Menjivar MD.     ASSISTANTS: Nelly Nayak    ANESTHESIA: General.     ESTIMATED BLOOD LOSS: 200 mL. COMPLICATIONS: None. DISPOSITION: To PACU in good condition. IMPLANTS USED: Rachelle Avenir size 5 stem, 28-3.5 head, 45 mm shell and liner. INDICATIONS FOR PROCEDURE: The patient is a 80 y.o. female who sustained a fall onto the Left hip. The patient was brought to [facility] ER, and diagnosed with a femoral neck fracture. The patient was subsequently admitted to the hospital for definitive treatment. I recommended operative fixation of the hip in the form of a  hemiarthroplasty. The patient and/or family was explained the risks, benefits,  complications, and alternative to the procedure. They subsequently provided  written informed consent for the procedure. DESCRIPTION OF PROCEDURE: The patient was seen in the preoperative holding  area. The Left hip was marked. The patient was seen by Anesthesia Service. The patient was then brought to the operating room. The patient was induced under general anesthesia on the hospital bed. Patient was then transferred onto the operating room table. Prophylactic preoperative IV antibiotics were given. Patient had DVT prophylaxis with a sequential compression device on  the nonoperative lower extremity. Patient was then placed in the lateral  decubitus position with the operative side up. An axillary roll was placed. Care  was taken to ensure that all bony prominences were well padded. The operative leg  and hip were sterilely prepped and draped in the usual fashion. Timeout was  taken, where the patient, the operative extremity, and the operative  procedure were once again verified. We then performed a standard posterolateral approach to the hip. 45 degrees of anteversion. We thus inserted the 45 mm shell, this was then reduced and once again trialed, which demonstrated stability. We drilled 2 drill holes through the greater  trochanter and passed our tag sutures through this and reapproximated our  external rotators, as well as our capsule and tied over the bone bridge of  the femur. The leg was then left in external rotation. We then closed with  IT band with 0 Vicryl sutures in a figure-of-eight fashion. The skin was  then closed subcutaneously with 2-0 Vicryl suture in simple inverted  interrupted fashion. The skin was then closed with staples. Xeroform gauze,  4 x 4 gauze, ABD pad, and tape were then applied. The patient was then  placed in a hip abduction pillow. The patient was awoken from general  anesthesia and transferred onto the hospital bed and transported to the PACU  for recovery. The patient tolerated the procedure well and without any  complications. PLAN: The patient will be recovered in the PACU, then be readmitted to the  floor. The patient will have posterior hip precautions. The patient will have DVT  prophylaxis. The patient will have 24 hours of prophylactic IV  antibiotics and will have PT and OT consultations.          Electronically signed by Hossein Helton MD on 12/24/2019 at 2:03 PM

## 2019-12-24 NOTE — BRIEF OP NOTE
Brief Postoperative Note  ______________________________________________________________    Patient: Paulina Camejo  YOB: 1928  MRN: [id number]  Date of Procedure: 12/24/2019    Pre-Op Diagnosis: LEFT FEMORAL NECK FRACTURE    Post-Op Diagnosis: Same       Procedure(s):  LEFT HIP HEMIARTHROPLASTY    Anesthesia: Regional, General    Surgeon(s):  Mk Thompson MD    Assistant: Aury Patton    Estimated Blood Loss (mL): 740     Complications: None    Specimens:   * No specimens in log *    Implants:  Implant Name Type Inv.  Item Serial No.  Lot No. LRB No. Used   MultiPolar Bipolar Cup    Josesito Relcy [id number] Left 1   femoral head     [id number] Left 1   Multipolar Bipolar Cup     [id number] Left 1   5 taper Stem    [facility] [id number] Left 1         Drains:   Urethral Catheter (Active)   Catheter Indications Need for fluid management in critically ill patients in a critical care setting not able to be managed by other means such as BSC with hat, bedpan, urinal, condom catheter, or short term intermittent urethral catherization 12/24/2019  4:19 AM   Site Assessment Pink 12/23/2019  6:13 AM   Urine Color Yellow 12/23/2019  6:13 AM   Urine Appearance Clear 12/23/2019  6:13 AM   Output (mL) 475 mL 12/24/2019  1:46 AM           Mk Thompson MD  Date: 12/24/2019  Time: 2:01 PM

## 2019-12-24 NOTE — H&P
Preoperative H&P Update    The patient's History and Physical in the medical record from 12/23/19 was reviewed by me today. I reviewed the HPI, medications, allergies, reason for surgery, diagnosis and treatment plan and there has been no interval change. The patient was examined by me today.  Physical exam findings for this update include:    /78   Pulse 72   Temp 99.2 °F (37.3 °C) (Temporal)   Resp 16   Ht 5' 6\" (1.676 m)   Wt 172 lb 6.4 oz (78.2 kg)   SpO2 95%   BMI 27.83 kg/m²   Airway is intact  Chest: breathing comfortably  Heart: regular rate  Findings on exam of the body region where surgery is to be performed include: see last office and/or consult note      Electronically signed by Josefina Scott MD on 12/24/2019 at 12:16 PM

## 2019-12-24 NOTE — PROGRESS NOTES
Arrived from OR to pacu. Awakening. Resps adequate on O2 per NC. VSS. Dressing to left hip CDI, pedal pulse palpable.

## 2019-12-24 NOTE — PLAN OF CARE
Problem: Pain:  Goal: Control of acute pain  Description  Control of acute pain  Outcome: Ongoing  Note:   Pt A&OX4, /78   Pulse 72   Temp 99.2 °F (37.3 °C) (Temporal)   Resp 16   Ht 5' 6\" (1.676 m)   Wt 172 lb 6.4 oz (78.2 kg)   SpO2 95%   BMI 27.83 kg/m²   Afib on tele(chronic paroxysmal afib), c/o severe left hip pain, iv dilaudid given prn, pain reduced but still present, in surgery now     Problem: Falls - Risk of:  Goal: Absence of physical injury  Description  Absence of physical injury  Outcome: Ongoing  Note:   D:  Patient continues to be a fall risk d/t weakness  A:  Bed alarm on, reminded to call before ambulation  R:  Patient calls before ambulation, Will continue to assess fall risk status and report changes. Problem: Risk for Impaired Skin Integrity  Goal: Tissue integrity - skin and mucous membranes  Description  Structural intactness and normal physiological function of skin and  mucous membranes.   Outcome: Ongoing  Note:   Blister to right buttocks, redness as well but blanches, foam dressing applied to blister, turned and repositioned q2hrs

## 2019-12-24 NOTE — ANESTHESIA PROCEDURE NOTES
Peripheral Block    Patient location during procedure: pre-op  Start time: 12/24/2019 12:15 PM  End time: 12/24/2019 12:19 PM  Staffing  Anesthesiologist: Demond Aguilar MD  Resident/CRNA: LEILANI Jay CRNA  Performed: resident/CRNA   Preanesthetic Checklist  Completed: patient identified, site marked, surgical consent, pre-op evaluation, timeout performed, IV checked, risks and benefits discussed, monitors and equipment checked, anesthesia consent given, oxygen available and patient being monitored  Peripheral Block  Patient position: supine  Prep: ChloraPrep  Patient monitoring: cardiac monitor, continuous pulse ox, frequent blood pressure checks and IV access  Block type: Fascia iliaca  Laterality: left  Injection technique: single-shot  Procedures: ultrasound guided  Local infiltration: bupivacaine  Infiltration strength: 0.25 %  Dose: 30 mL  Provider prep: sterile gloves and sterile gown  Local infiltration: bupivacaine  Needle  Needle type: combined needle/nerve stimulator   Needle gauge: 20 G  Needle localization: anatomical landmarks and ultrasound guidance  Assessment  Injection assessment: negative aspiration for heme, no paresthesia on injection and local visualized surrounding nerve on ultrasound  Paresthesia pain: none  Slow fractionated injection: yes  Hemodynamics: stable  Reason for block: primary anesthetic and at surgeon's request

## 2019-12-24 NOTE — PLAN OF CARE
Patient instructed on the use of the 0-10 pain scale. Patient will use this scale to report the level of pain. Pain is being controlled at this time. Fall precautions in place, fall armband on arm, yellow towel on bed rail, sign on door, bed/chair alarm on. Patient instructed to call before getting up.

## 2019-12-25 LAB
ALBUMIN SERPL-MCNC: 3.2 G/DL (ref 3.4–5)
ANION GAP SERPL CALCULATED.3IONS-SCNC: 13 MMOL/L (ref 3–16)
BASOPHILS ABSOLUTE: 0 K/UL (ref 0–0.2)
BASOPHILS RELATIVE PERCENT: 0.2 %
BUN BLDV-MCNC: 21 MG/DL (ref 7–20)
CALCIUM SERPL-MCNC: 8.6 MG/DL (ref 8.3–10.6)
CHLORIDE BLD-SCNC: 98 MMOL/L (ref 99–110)
CO2: 20 MMOL/L (ref 21–32)
CREAT SERPL-MCNC: 1.1 MG/DL (ref 0.6–1.2)
EOSINOPHILS ABSOLUTE: 0 K/UL (ref 0–0.6)
EOSINOPHILS RELATIVE PERCENT: 0.1 %
GFR AFRICAN AMERICAN: 56
GFR NON-AFRICAN AMERICAN: 46
GLUCOSE BLD-MCNC: 137 MG/DL (ref 70–99)
HCT VFR BLD CALC: 31.7 % (ref 36–48)
HEMOGLOBIN: 10.9 G/DL (ref 12–16)
LYMPHOCYTES ABSOLUTE: 0.6 K/UL (ref 1–5.1)
LYMPHOCYTES RELATIVE PERCENT: 7.6 %
MCH RBC QN AUTO: 32.6 PG (ref 26–34)
MCHC RBC AUTO-ENTMCNC: 34.3 G/DL (ref 31–36)
MCV RBC AUTO: 95 FL (ref 80–100)
MONOCYTES ABSOLUTE: 0.5 K/UL (ref 0–1.3)
MONOCYTES RELATIVE PERCENT: 6.5 %
NEUTROPHILS ABSOLUTE: 6.8 K/UL (ref 1.7–7.7)
NEUTROPHILS RELATIVE PERCENT: 85.6 %
PDW BLD-RTO: 12.8 % (ref 12.4–15.4)
PHOSPHORUS: 2.4 MG/DL (ref 2.5–4.9)
PLATELET # BLD: 146 K/UL (ref 135–450)
PMV BLD AUTO: 8.1 FL (ref 5–10.5)
POTASSIUM SERPL-SCNC: 4.2 MMOL/L (ref 3.5–5.1)
RBC # BLD: 3.34 M/UL (ref 4–5.2)
SODIUM BLD-SCNC: 131 MMOL/L (ref 136–145)
WBC # BLD: 7.9 K/UL (ref 4–11)

## 2019-12-25 PROCEDURE — 6370000000 HC RX 637 (ALT 250 FOR IP): Performed by: ORTHOPAEDIC SURGERY

## 2019-12-25 PROCEDURE — 36415 COLL VENOUS BLD VENIPUNCTURE: CPT

## 2019-12-25 PROCEDURE — 97530 THERAPEUTIC ACTIVITIES: CPT

## 2019-12-25 PROCEDURE — 6360000002 HC RX W HCPCS: Performed by: ORTHOPAEDIC SURGERY

## 2019-12-25 PROCEDURE — 80069 RENAL FUNCTION PANEL: CPT

## 2019-12-25 PROCEDURE — 97166 OT EVAL MOD COMPLEX 45 MIN: CPT

## 2019-12-25 PROCEDURE — 97162 PT EVAL MOD COMPLEX 30 MIN: CPT

## 2019-12-25 PROCEDURE — 97116 GAIT TRAINING THERAPY: CPT

## 2019-12-25 PROCEDURE — 85025 COMPLETE CBC W/AUTO DIFF WBC: CPT

## 2019-12-25 PROCEDURE — 2580000003 HC RX 258: Performed by: ORTHOPAEDIC SURGERY

## 2019-12-25 PROCEDURE — 99024 POSTOP FOLLOW-UP VISIT: CPT | Performed by: ORTHOPAEDIC SURGERY

## 2019-12-25 PROCEDURE — 1200000000 HC SEMI PRIVATE

## 2019-12-25 PROCEDURE — 94760 N-INVAS EAR/PLS OXIMETRY 1: CPT

## 2019-12-25 RX ADMIN — Medication 10 ML: at 22:57

## 2019-12-25 RX ADMIN — ASPIRIN 81 MG 81 MG: 81 TABLET ORAL at 08:33

## 2019-12-25 RX ADMIN — DOCUSATE SODIUM 100 MG: 100 CAPSULE ORAL at 08:33

## 2019-12-25 RX ADMIN — HYDROCODONE BITARTRATE AND ACETAMINOPHEN 1 TABLET: 5; 325 TABLET ORAL at 09:21

## 2019-12-25 RX ADMIN — LORAZEPAM 0.5 MG: 0.5 TABLET ORAL at 17:42

## 2019-12-25 RX ADMIN — LORAZEPAM 0.5 MG: 0.5 TABLET ORAL at 08:33

## 2019-12-25 RX ADMIN — LEVOTHYROXINE SODIUM 50 MCG: 0.03 TABLET ORAL at 06:44

## 2019-12-25 RX ADMIN — Medication 2 G: at 04:13

## 2019-12-25 RX ADMIN — HYDROCODONE BITARTRATE AND ACETAMINOPHEN 1 TABLET: 5; 325 TABLET ORAL at 03:09

## 2019-12-25 RX ADMIN — METOPROLOL TARTRATE 25 MG: 25 TABLET, FILM COATED ORAL at 08:33

## 2019-12-25 RX ADMIN — SODIUM CHLORIDE: 9 INJECTION, SOLUTION INTRAVENOUS at 07:17

## 2019-12-25 RX ADMIN — HYDROCODONE BITARTRATE AND ACETAMINOPHEN 1 TABLET: 5; 325 TABLET ORAL at 18:53

## 2019-12-25 RX ADMIN — SENNOSIDES AND DOCUSATE SODIUM 1 TABLET: 8.6; 5 TABLET ORAL at 08:33

## 2019-12-25 RX ADMIN — ENOXAPARIN SODIUM 40 MG: 40 INJECTION SUBCUTANEOUS at 10:24

## 2019-12-25 RX ADMIN — METOPROLOL TARTRATE 25 MG: 25 TABLET, FILM COATED ORAL at 22:55

## 2019-12-25 ASSESSMENT — PAIN SCALES - GENERAL
PAINLEVEL_OUTOF10: 5
PAINLEVEL_OUTOF10: 0
PAINLEVEL_OUTOF10: 0
PAINLEVEL_OUTOF10: 4
PAINLEVEL_OUTOF10: 3
PAINLEVEL_OUTOF10: 5

## 2019-12-25 ASSESSMENT — PAIN SCALES - WONG BAKER
WONGBAKER_NUMERICALRESPONSE: 0
WONGBAKER_NUMERICALRESPONSE: 0

## 2019-12-25 NOTE — PROGRESS NOTES
Migraine, unspecified, without mention of intractable migraine without mention of status migrainosus, Other disorders of kidney and ureter, Peripheral neuropathy, Psychiatric problem, Risk for falls, Spinal stenosis, Thyroid disease, Unspecified glaucoma(365.9), and Unspecified hearing loss. has a past surgical history that includes Colon surgery; Kidney surgery; Ovary removal; Appendectomy; eye surgery; Cataract removal (Bilateral, 04/2017); and Colonoscopy. Treatment Diagnosis: Decreased functional mobility, ADL/IADL status, activity tolerance, balance related to s/p L hip hemiarthroplasty      Restrictions  Restrictions/Precautions  Restrictions/Precautions: Weight Bearing, Fall Risk(high fall risk)  Lower Extremity Weight Bearing Restrictions  Left Lower Extremity Weight Bearing: Weight Bearing As Tolerated  Position Activity Restriction  Hip Precautions: No hip flexion > 90 degrees, No ADduction, Posterior hip precautions, No hip internal rotation  Other position/activity restrictions: ABductor pillow while in bed and while turning while in hospital, then use regular pillow afterwards; S/p mechanical fall with hip fracture, s/p L hip hemiarthroplasty    Subjective   General  Chart Reviewed: Yes  Additional Pertinent Hx: hearing loss, glaucoma, spinal stenosis, neuropathy, a-fib  Family / Caregiver Present: Yes()  Diagnosis: s/p L hip hemiarthroplasty  Subjective  Subjective: Pt lying supine in bed upon arrival, agreeable to evaluation.   General Comment  Comments: Pt goes by [employer]"  Patient Currently in Pain: Denies(\"uncomfortable\")  Vital Signs  Patient Currently in Pain: Denies(\"uncomfortable\")  Social/Functional History  Social/Functional History  Lives With: Spouse  Type of Home: House  Home Layout: One level  Home Access: Stairs to enter without rails  Entrance Stairs - Number of Steps: 1 + 1 AYAH  Bathroom Shower/Tub: Tub/Shower unit, Shower chair with back  [employer]: Handicap assistance  Transfers  Stand Step Transfers: Dependent/Total(min A of 2 progressing to mod A of 2 with RW with increased time)  Sit to stand: Dependent/Total(mod A of 2 from EOB with bed height elevated)  Stand to sit: Dependent/Total(mod A of 2 into bedside chair)  Vision - Basic Assessment  Prior Vision: Wears glasses all the time  Visual History: Glaucoma  Patient Visual Report: No visual complaint reported. Cognition  Overall Cognitive Status: WFL        Sensation  Overall Sensation Status: Impaired(neuropathy from knee to toes)        LUE AROM (degrees)  LUE AROM : WFL  RUE AROM (degrees)  RUE AROM : WFL  LUE Strength  Gross LUE Strength: WFL  LUE Strength Comment: grossly 4/5  RUE Strength  Gross RUE Strength: WFL  RUE Strength Comment: grossly 4/5                   Plan   Plan  Times per week: 7x  Times per day: Daily  Specific instructions for Next Treatment: cotx  Current Treatment Recommendations: Balance Training, Endurance Training, Functional Mobility Training, Equipment Evaluation, Education, & procurement, Self-Care / ADL, Patient/Caregiver Education & Training, Safety Education & Training           AM-PAC Score        -[facility] Inpatient Daily Activity Raw Score: 13 (12/25/19 1149)  AM-PAC Inpatient ADL T-Scale Score : 32.03 (12/25/19 1149)  ADL Inpatient CMS 0-100% Score: 63.03 (12/25/19 1149)  ADL Inpatient CMS G-Code Modifier : CL (12/25/19 1149)    Goals  Short term goals  Time Frame for Short term goals: Discharge  Short term goal 1: SBA for bed mobility. Short term goal 2: Min A for functional transfers. Short term goal 3: Min A for functional mobility. Short term goal 4: Mod A for LB ADLs with AE. Short term goal 5: SBA for all UB ADLs. Long term goals  Time Frame for Long term goals : STG=LTG  Patient Goals   Patient goals :  \"To consider senior living apartment with  after rehab\"       Therapy Time   Individual Concurrent Group Co-treatment   Time In 1055         Time Out 7274

## 2019-12-25 NOTE — PLAN OF CARE
Problem: Pain:  Goal: Pain level will decrease  Description  Pain level will decrease  12/25/2019 0441 by Kelly Lion RN  Outcome: Ongoing  12/24/2019 1600 by Robb Reina RN  Outcome: Ongoing  Goal: Control of acute pain  Description  Control of acute pain  12/25/2019 0441 by Kelly Lion RN  Outcome: Ongoing  12/24/2019 1600 by Robb Reina RN  Outcome: Ongoing  Goal: Control of chronic pain  Description  Control of chronic pain  12/25/2019 0441 by Kelly Lion RN  Outcome: Ongoing  12/24/2019 1600 by Robb Reina RN  Outcome: Ongoing     Problem: Falls - Risk of:  Goal: Will remain free from falls  Description  Will remain free from falls  12/25/2019 0441 by Kelly Lion RN  Outcome: Ongoing  12/24/2019 1600 by Robb Reina RN  Outcome: Ongoing  Goal: Absence of physical injury  Description  Absence of physical injury  12/25/2019 0441 by Kelly Lion RN  Outcome: Ongoing  12/24/2019 1600 by Robb Reina RN  Outcome: Ongoing     Problem: Risk for Impaired Skin Integrity  Goal: Tissue integrity - skin and mucous membranes  Description  Structural intactness and normal physiological function of skin and  mucous membranes.   12/25/2019 0441 by Kelly Lion RN  Outcome: Ongoing  12/24/2019 1600 by Robb Reina RN  Outcome: Ongoing     Problem: Discharge Planning:  Goal: Knowledge of discharge instructions  Description  Knowledge of discharge instructions  12/25/2019 0441 by Kelly Lion RN  Outcome: Ongoing  12/24/2019 1600 by Robb Reina RN  Outcome: Ongoing     Problem: Infection - Surgical Site:  Goal: Signs of wound healing will improve  Description  Signs of wound healing will improve  12/25/2019 0441 by Kelly Lion RN  Outcome: Ongoing  12/24/2019 1600 by Robb Reina RN  Outcome: Ongoing     Problem: Mobility - Impaired:  Goal: Achieve maximum mobility

## 2019-12-25 NOTE — PROGRESS NOTES
Incentive Spirometry education and demonstration completed by Respiratory Therapy Yes      Response to education: Excellent     Teaching Time: 5 minutes    Minimum Predicted Vital Capacity - 501 mL. Patient's Actual Vital Capacity - 1000 mL. Turning over to Nursing for routine follow-up Yes.     Comments: pt. Performed IS very well    Electronically signed by Keena Lopez RCP on 12/25/2019 at 4:47 AM

## 2019-12-25 NOTE — PROGRESS NOTES
11/19/19 145 lb 8 oz (66 kg)   07/20/19 145 lb (65.8 kg)       General appearance:  Appears comfortable after some emotional supportive. Eyes: Sclera clear. Pupils equal.  ENT: Moist oral mucosa. Trachea midline, no adenopathy. Cardiovascular: Regular rhythm, normal S1, S2. No murmur. No edema in lower extremities  Respiratory: Not using accessory muscles. Good inspiratory effort. Clear to auscultation bilaterally, no wheeze or crackles. GI: Abdomen soft, no tenderness, not distended, normal bowel sounds  Musculoskeletal: No cyanosis in digits, neck supple. Neurology: CN 2-12 grossly intact. No speech or motor deficits  Psych: Normal affect. Alert and oriented in time, place and person  Skin: Warm, dry, normal turgor    Labs and Tests:  CBC:   Recent Labs     12/23/19  0324 12/25/19  0713   WBC 3.9* 7.9   HGB 13.1 10.9*    146     BMP:    Recent Labs     12/23/19  0324 12/25/19  0714    131*   K 4.0 4.2    98*   CO2 24 20*   BUN 22* 21*   CREATININE 1.2 1.1   GLUCOSE 121* 137*     Hepatic:   Recent Labs     12/23/19  0324   AST 17   ALT 15   BILITOT 0.4   ALKPHOS 111           Problem List  Active Problems:    Closed fracture of left hip (HCC)  Resolved Problems:    * No resolved hospital problems. *       Assessment & Plan:   1. S/p mechanical fall with hip fracture:  POD # 1 from left gerard arthroplasty. On lovenox for DVT prophylaxis  2. Acute blood loss anemia: To be expected. No indication for transfusion  3. Hyponatremia:  Will stop IV fluids,  Pt needs to eat. I made her a strawberry milkshake . Po intake encouraged   4. Hx of PAF:  Has been followed by cardiology and started on low dose BB. Notes reflect a recommendation for low dose eliquis in the post operative period. 5. Hypothyroidism: On levothyroxine,  tsh in range at 2.62   6. Chronic anxiety: on home dose of ativan   7. Will likely need ECF .   PT and OT to follow       Diet: Dietary Nutrition Supplements: Standard High Calorie Oral Supplement  DIET GENERAL;  Code:Full Code  DVT PPX      Dana LEILANI Rooney - CNP   12/25/2019 10:36 AM

## 2019-12-25 NOTE — PROGRESS NOTES
a past medical history of Anxiety, Atrial fibrillation ([facility] [address] Disorder of bone and cartilage, unspecified, Fatigue, Hypertension, Migraine, unspecified, without mention of intractable migraine without mention of status migrainosus, Other disorders of kidney and ureter, Peripheral neuropathy, Psychiatric problem, Risk for falls, Spinal stenosis, Thyroid disease, Unspecified glaucoma(365.9), and Unspecified hearing loss. has a past surgical history that includes Colon surgery; Kidney surgery; Ovary removal; Appendectomy; eye surgery; Cataract removal (Bilateral, 04/2017); and Colonoscopy.     Restrictions  Restrictions/Precautions  Restrictions/Precautions: Weight Bearing, Fall Risk(high fall risk)  Lower Extremity Weight Bearing Restrictions  Left Lower Extremity Weight Bearing: Weight Bearing As Tolerated  Position Activity Restriction  Hip Precautions: No hip flexion > 90 degrees, No ADduction, Posterior hip precautions, No hip internal rotation  Other position/activity restrictions: ABductor pillow while in bed and while turning while in hospital, then use regular pillow afterwards; S/p mechanical fall with hip fracture, s/p L hip hemiarthroplasty  Vision/Hearing wears B hearing aids,         Subjective  Pain Screening  Patient Currently in Pain: Denies(\"uncomfortable\")          Orientation  Orientation  Overall Orientation Status: Within Normal Limits  Social/Functional History  Social/Functional History  Lives With: Spouse  Type of Home: House  Home Layout: One level  Home Access: Stairs to enter without rails  Entrance Stairs - Number of Steps: 1 + 1 AYAH  Bathroom Shower/Tub: Tub/Shower unit, Shower chair with back  Bathroom Toilet: Handicap height  Bathroom Equipment: Shower chair  Bathroom Accessibility: Walker accessible  Home Equipment: [place], Rolling walker([employer]urrycane)  ADL Assistance: Independent  Homemaking Assistance: (pt cooks, completes laundry, making beds;  assists with sweeping (heavier

## 2019-12-26 LAB
ALBUMIN SERPL-MCNC: 3.3 G/DL (ref 3.4–5)
ANION GAP SERPL CALCULATED.3IONS-SCNC: 10 MMOL/L (ref 3–16)
BASOPHILS ABSOLUTE: 0 K/UL (ref 0–0.2)
BASOPHILS RELATIVE PERCENT: 0.5 %
BUN BLDV-MCNC: 21 MG/DL (ref 7–20)
CALCIUM SERPL-MCNC: 8.7 MG/DL (ref 8.3–10.6)
CHLORIDE BLD-SCNC: 101 MMOL/L (ref 99–110)
CO2: 22 MMOL/L (ref 21–32)
CREAT SERPL-MCNC: 1 MG/DL (ref 0.6–1.2)
EOSINOPHILS ABSOLUTE: 0 K/UL (ref 0–0.6)
EOSINOPHILS RELATIVE PERCENT: 0.2 %
GFR AFRICAN AMERICAN: >60
GFR NON-AFRICAN AMERICAN: 52
GLUCOSE BLD-MCNC: 107 MG/DL (ref 70–99)
HCT VFR BLD CALC: 30.7 % (ref 36–48)
HEMOGLOBIN: 10.5 G/DL (ref 12–16)
LYMPHOCYTES ABSOLUTE: 0.8 K/UL (ref 1–5.1)
LYMPHOCYTES RELATIVE PERCENT: 15.8 %
MCH RBC QN AUTO: 32.2 PG (ref 26–34)
MCHC RBC AUTO-ENTMCNC: 34 G/DL (ref 31–36)
MCV RBC AUTO: 94.6 FL (ref 80–100)
MONOCYTES ABSOLUTE: 0.5 K/UL (ref 0–1.3)
MONOCYTES RELATIVE PERCENT: 10.6 %
NEUTROPHILS ABSOLUTE: 3.6 K/UL (ref 1.7–7.7)
NEUTROPHILS RELATIVE PERCENT: 72.9 %
PDW BLD-RTO: 12.8 % (ref 12.4–15.4)
PHOSPHORUS: 2.2 MG/DL (ref 2.5–4.9)
PLATELET # BLD: 139 K/UL (ref 135–450)
PMV BLD AUTO: 8.5 FL (ref 5–10.5)
POTASSIUM SERPL-SCNC: 4.7 MMOL/L (ref 3.5–5.1)
RBC # BLD: 3.25 M/UL (ref 4–5.2)
SODIUM BLD-SCNC: 133 MMOL/L (ref 136–145)
WBC # BLD: 5 K/UL (ref 4–11)

## 2019-12-26 PROCEDURE — 99024 POSTOP FOLLOW-UP VISIT: CPT | Performed by: NURSE PRACTITIONER

## 2019-12-26 PROCEDURE — 6370000000 HC RX 637 (ALT 250 FOR IP): Performed by: NURSE PRACTITIONER

## 2019-12-26 PROCEDURE — 36415 COLL VENOUS BLD VENIPUNCTURE: CPT

## 2019-12-26 PROCEDURE — 97530 THERAPEUTIC ACTIVITIES: CPT

## 2019-12-26 PROCEDURE — 2580000003 HC RX 258: Performed by: ORTHOPAEDIC SURGERY

## 2019-12-26 PROCEDURE — 1200000000 HC SEMI PRIVATE

## 2019-12-26 PROCEDURE — APPNB60 APP NON BILLABLE TIME 46-60 MINS: Performed by: NURSE PRACTITIONER

## 2019-12-26 PROCEDURE — 97535 SELF CARE MNGMENT TRAINING: CPT

## 2019-12-26 PROCEDURE — 51798 US URINE CAPACITY MEASURE: CPT

## 2019-12-26 PROCEDURE — 6370000000 HC RX 637 (ALT 250 FOR IP): Performed by: ORTHOPAEDIC SURGERY

## 2019-12-26 PROCEDURE — 94760 N-INVAS EAR/PLS OXIMETRY 1: CPT

## 2019-12-26 PROCEDURE — 97116 GAIT TRAINING THERAPY: CPT

## 2019-12-26 PROCEDURE — 80069 RENAL FUNCTION PANEL: CPT

## 2019-12-26 PROCEDURE — 85025 COMPLETE CBC W/AUTO DIFF WBC: CPT

## 2019-12-26 PROCEDURE — 6360000002 HC RX W HCPCS: Performed by: ORTHOPAEDIC SURGERY

## 2019-12-26 RX ORDER — HYDROCODONE BITARTRATE AND ACETAMINOPHEN 5; 325 MG/1; MG/1
1 TABLET ORAL EVERY 6 HOURS PRN
Qty: 12 TABLET | Refills: 0 | Status: SHIPPED | OUTPATIENT
Start: 2019-12-26 | End: 2019-12-29

## 2019-12-26 RX ORDER — TAMSULOSIN HYDROCHLORIDE 0.4 MG/1
0.4 CAPSULE ORAL DAILY
Status: DISCONTINUED | OUTPATIENT
Start: 2019-12-26 | End: 2020-01-01 | Stop reason: HOSPADM

## 2019-12-26 RX ORDER — BETHANECHOL CHLORIDE 10 MG/1
10 TABLET ORAL 3 TIMES DAILY
Status: DISCONTINUED | OUTPATIENT
Start: 2019-12-26 | End: 2020-01-01 | Stop reason: HOSPADM

## 2019-12-26 RX ADMIN — TAMSULOSIN HYDROCHLORIDE 0.4 MG: 0.4 CAPSULE ORAL at 11:20

## 2019-12-26 RX ADMIN — LORAZEPAM 0.5 MG: 0.5 TABLET ORAL at 08:49

## 2019-12-26 RX ADMIN — HYDROCODONE BITARTRATE AND ACETAMINOPHEN 2 TABLET: 5; 325 TABLET ORAL at 00:21

## 2019-12-26 RX ADMIN — ASPIRIN 81 MG 81 MG: 81 TABLET ORAL at 08:49

## 2019-12-26 RX ADMIN — Medication 10 ML: at 22:00

## 2019-12-26 RX ADMIN — BETHANECHOL CHLORIDE 10 MG: 10 TABLET ORAL at 19:01

## 2019-12-26 RX ADMIN — LEVOTHYROXINE SODIUM 50 MCG: 0.03 TABLET ORAL at 06:06

## 2019-12-26 RX ADMIN — BETHANECHOL CHLORIDE 10 MG: 10 TABLET ORAL at 11:19

## 2019-12-26 RX ADMIN — ENOXAPARIN SODIUM 40 MG: 40 INJECTION SUBCUTANEOUS at 08:50

## 2019-12-26 RX ADMIN — HYDROCODONE BITARTRATE AND ACETAMINOPHEN 1 TABLET: 5; 325 TABLET ORAL at 08:48

## 2019-12-26 RX ADMIN — SENNOSIDES AND DOCUSATE SODIUM 1 TABLET: 8.6; 5 TABLET ORAL at 22:00

## 2019-12-26 RX ADMIN — HYDROCODONE BITARTRATE AND ACETAMINOPHEN 2 TABLET: 5; 325 TABLET ORAL at 16:49

## 2019-12-26 RX ADMIN — DOCUSATE SODIUM 100 MG: 100 CAPSULE ORAL at 22:00

## 2019-12-26 RX ADMIN — METOPROLOL TARTRATE 25 MG: 25 TABLET, FILM COATED ORAL at 08:49

## 2019-12-26 RX ADMIN — POLYETHYLENE GLYCOL 3350 17 G: 17 POWDER, FOR SOLUTION ORAL at 23:18

## 2019-12-26 ASSESSMENT — PAIN DESCRIPTION - DESCRIPTORS
DESCRIPTORS: ACHING
DESCRIPTORS: SHARP

## 2019-12-26 ASSESSMENT — PAIN SCALES - GENERAL
PAINLEVEL_OUTOF10: 0
PAINLEVEL_OUTOF10: 0
PAINLEVEL_OUTOF10: 2
PAINLEVEL_OUTOF10: 4
PAINLEVEL_OUTOF10: 5
PAINLEVEL_OUTOF10: 8
PAINLEVEL_OUTOF10: 0
PAINLEVEL_OUTOF10: 7
PAINLEVEL_OUTOF10: 0

## 2019-12-26 ASSESSMENT — PAIN DESCRIPTION - ORIENTATION
ORIENTATION: LEFT
ORIENTATION: LEFT

## 2019-12-26 ASSESSMENT — PAIN SCALES - WONG BAKER
WONGBAKER_NUMERICALRESPONSE: 0

## 2019-12-26 ASSESSMENT — PAIN DESCRIPTION - LOCATION
LOCATION: HIP
LOCATION: HIP

## 2019-12-26 ASSESSMENT — PAIN DESCRIPTION - PAIN TYPE
TYPE: SURGICAL PAIN
TYPE: SURGICAL PAIN

## 2019-12-26 NOTE — PROGRESS NOTES
[facility] Orthopedic Surgery   Progress Note    CHIEF COMPLAINT/DIAGNOSIS: S/p LEFT hip pain. SUBJECTIVE: The patient is sitting up in bed. She reports mild hip pain at rest worsened with activity. Denies numbness or tingling. Denies new issues. Posterior precautions reviewed. OBJECTIVE  Physical    VITALS:  BP (!) 147/65   Pulse 85   Temp 98.2 °F (36.8 °C) (Oral)   Resp 16   Ht 5' 6\" (1.676 m)   Wt 172 lb 6.4 oz (78.2 kg)   SpO2 97%   BMI 27.83 kg/m²     GENERAL: Alert and oriented x3, in no acute distress. MUSCULOSKELETAL:  Able to dorsi and plantarflex the ankle without issue. INCISION:  Covered with post-op dressing; clean dry and intact. ROM: Limited secondary pain and swelling. Sensory:  Intact to light touch in peroneal and tibial distributions. Vascular:   2+ DP pulses with brisk cap refill;  calf soft and nontender. Data    ALL MEDICATIONS HAVE BEEN REVIEWED    CBC:   Recent Labs     12/25/19  0713 12/26/19  0627   WBC 7.9 5.0   HGB 10.9* 10.5*   HCT 31.7* 30.7*    139     BMP:   Recent Labs     12/25/19  0714 12/26/19  0627   * 133*   K 4.2 4.7   CL 98* 101   CO2 20* 22   PHOS 2.4* 2.2*   BUN 21* 21*   CREATININE 1.1 1.0     INR: No results for input(s): INR in the last 72 hours. Post-op films show stable hemiarthroplasty construct in good alignment without fracture. ASSESSMENT:  S/p LEFT hip hemiarthroplasty (12/24/19), POD#2  Acute blood loss anemia as expected  PAF  Anxiety    PLAN:   - WB status:  100% weight bearing through operative extremity.   - DVT prophylaxis: Lovenox, will d/c with 2.5mg Eliquis x 30 days per cardiology recommendation.  - PT/OT  - D/C Plan: SNF  - Pain Control: Current regimen (script for norco in chart) Due to orthopaedic surgical procedure/condition, patient may require pain medication for up to 6-8 weeks. - Expected post op acute blood loss anemia: H/H today: 10.5/30.7. Follow-up in two weeks with Dr. Naima Choudhary.   Office #

## 2019-12-26 NOTE — PROGRESS NOTES
Shift assessment complete. Patient alert and oriented. Dorsiflexion and plantar flexion are moderate. Cap refill < 3 seconds. Pulses present. Reviewed meds. The care plan and education has been reviewed and mutually agreed upon with the patient. Patient remains free from falls or accidental injury. Room free from clutter. All fall precautions in place. Bed in lowest position with wheels locked and alarm on, call light and belongings within reach, and door open.

## 2019-12-26 NOTE — PROGRESS NOTES
[facility] PROGRESS NOTE    12/26/2019 3:04 PM        Name: Dee Felix .               Admitted: 12/23/2019  Primary Care Provider: Tianna Good MD (Tel: [phone])      Subjective:      Pt seen this am with ortho at bedside  In good spirits today   We discussed d/c plans  Not eating much     + urinary retention  Lucero re inserted      with dementia now being cared for by his son       Reviewed interval ancillary notes    Current Medications  tamsulosin (FLOMAX) capsule 0.4 mg, Daily  bethanechol (URECHOLINE) tablet 10 mg, TID  sodium chloride flush 0.9 % injection 10 mL, 2 times per day  sodium chloride flush 0.9 % injection 10 mL, PRN  docusate sodium (COLACE) capsule 100 mg, BID  sennosides-docusate sodium (SENOKOT-S) 8.6-50 MG tablet 1 tablet, BID  magnesium hydroxide (MILK OF MAGNESIA) 400 MG/5ML suspension 30 mL, Daily PRN  ondansetron (ZOFRAN) injection 4 mg, Q6H PRN  0.9 % sodium chloride infusion, Continuous  enoxaparin (LOVENOX) injection 40 mg, Daily  aspirin chewable tablet 81 mg, Daily  levothyroxine (SYNTHROID) tablet 50 mcg, QAM AC  LORazepam (ATIVAN) tablet 0.5 mg, Q8H PRN  polyethylene glycol (GLYCOLAX) packet 17 g, Daily PRN  acetaminophen (TYLENOL) tablet 650 mg, Q4H PRN  HYDROcodone-acetaminophen (NORCO) 5-325 MG per tablet 1 tablet, Q4H PRN    Or  HYDROcodone-acetaminophen (NORCO) 5-325 MG per tablet 2 tablet, Q4H PRN  HYDROmorphone HCl PF (DILAUDID) injection 0.25 mg, Q3H PRN    Or  HYDROmorphone HCl PF (DILAUDID) injection 0.5 mg, Q3H PRN  metoprolol tartrate (LOPRESSOR) tablet 25 mg, BID        Objective:  /61   Pulse 80   Temp 98.3 °F (36.8 °C) (Oral)   Resp 16   Ht 5' 6\" (1.676 m)   Wt 172 lb 6.4 oz (78.2 kg)   SpO2 97%   BMI 27.83 kg/m²     Intake/Output Summary (Last 24 hours) at 12/26/2019 1504  Last data filed at 12/26/2019 1042  Gross per 24 hour   Intake -- Output 1875 ml   Net -1875 ml      Wt Readings from Last 3 Encounters:   12/24/19 172 lb 6.4 oz (78.2 kg)   11/19/19 145 lb 8 oz (66 kg)   07/20/19 145 lb (65.8 kg)       General appearance:  Appears comfortable, alert and pleasant   Eyes: Sclera clear. Pupils equal.  ENT: Moist oral mucosa. Trachea midline, no adenopathy. Cardiovascular: Regular rhythm, normal S1, S2. No murmur. No edema in lower extremities  Respiratory: Not using accessory muscles. Good inspiratory effort. Clear to auscultation bilaterally, no wheeze or crackles. GI: Abdomen soft, no tenderness, not distended, normal bowel sounds  Musculoskeletal: No cyanosis in digits, neck supple. Neurology: CN 2-12 grossly intact. No speech or motor deficits  Psych: Normal affect. Alert and oriented in time, place and person  Skin: Warm, dry, normal turgor, left hip dressing is clean dry intact no significant edema noted     Labs and Tests:  CBC:   Recent Labs     12/25/19  0713 12/26/19  0627   WBC 7.9 5.0   HGB 10.9* 10.5*    139     BMP:    Recent Labs     12/25/19  0714 12/26/19  0627   * 133*   K 4.2 4.7   CL 98* 101   CO2 20* 22   BUN 21* 21*   CREATININE 1.1 1.0   GLUCOSE 137* 107*     Hepatic:   No results for input(s): AST, ALT, ALB, BILITOT, ALKPHOS in the last 72 hours. Problem List  Active Problems:    Closed fracture of left hip (HCC)  Resolved Problems:    * No resolved hospital problems. *       Assessment & Plan:   1. S/p mechanical fall with hip fracture:  POD # 2 from left gerard arthroplasty. On lovenox for DVT prophylaxis  2. Acute blood loss anemia: To be expected. No indication for transfusion  3. Hyponatremia:improving ,  Po solute intake encouraged   4. Hx of PAF:  Has been followed by cardiology and started on low dose BB. Notes reflect a recommendation for low dose eliquis in the post operative period.     5. Urinary retention:  Lucero re inserted,  flomax and urecholine added, will consider voiding trial in am   6. Hypothyroidism: On levothyroxine,  tsh in range at 2.62   7. Chronic anxiety: on home dose of ativan   8. Will likely need ECF .   PT and OT to follow       Diet: Dietary Nutrition Supplements: Standard High Calorie Oral Supplement  DIET GENERAL;  Code:Full Code  DVT PPX      LEILANI Mccord - CNP   12/26/2019 3:04 PM

## 2019-12-26 NOTE — PROGRESS NOTES
Deviations: Slow Amalia;Decreased step length;Decreased step height;Shuffles  Distance: 24'  Comments: multiple standing rest breaks  Stairs/Curb  Stairs?: No     Balance  Posture: Good  Sitting - Static: Good  Sitting - Dynamic: Good  Standing - Static: Fair;+(with RW)  Standing - Dynamic: Fair(with RW)  Comments: sat EOB ~15 minutes for ADL's SBA            Comment: discussed care with social workers upon d/c with patient in room              G-Code     OutComes Score                                                     AM-PAC Score  AM-PAC Inpatient Mobility Raw Score : 10 (12/26/19 1247)  AM-PAC Inpatient T-Scale Score : 32.29 (12/26/19 1247)  Mobility Inpatient CMS 0-100% Score: 76.75 (12/26/19 1247)  Mobility Inpatient CMS G-Code Modifier : CL (12/26/19 1247)          Goals  Short term goals  Time Frame for Short term goals: by discharged  Short term goal 1: Pt to demonstrate SBA for all transfers  Short term goal 2: Pt to AMB with LRD for 100 feet to allow return to PLOF  Short term goal 3: all Bed mobility SBA x 1  Patient Goals   Patient goals : pt goal is to return to PLOF so she can care for her    **no goals met this date    Plan    Plan  Times per week: 7x  Times per day: Daily  Plan weeks: until DC  Current Treatment Recommendations: Strengthening, Transfer Training, Neuromuscular Re-education, Patient/Caregiver Education & Training, Balance Training, Gait Training, Home Exercise Program, Functional Mobility Training, Safety Education & Training  Safety Devices  Type of devices: Chair alarm in place, Patient at risk for falls, Left in chair, All fall risk precautions in place, Call light within reach  Restraints  Initially in place: No     Therapy Time   Individual Concurrent Group Co-treatment   Time In 1022         Time Out 1101         Minutes 39         Timed Code Treatment Minutes: Calvinmatiffany 38, 2178 Mario Perez  I agree with the above note and have addressed this patient's

## 2019-12-26 NOTE — PROGRESS NOTES
Shift assessment and AM vitals complete, Am meds given, pt up with therapy this morning, pt tolerated well.

## 2019-12-26 NOTE — PROGRESS NOTES
Migraine, unspecified, without mention of intractable migraine without mention of status migrainosus, Other disorders of kidney and ureter, Peripheral neuropathy, Psychiatric problem, Risk for falls, Spinal stenosis, Thyroid disease, Unspecified glaucoma(365.9), and Unspecified hearing loss. has a past surgical history that includes Colon surgery; Kidney surgery; Ovary removal; Appendectomy; eye surgery; Cataract removal (Bilateral, 04/2017); and Colonoscopy. Restrictions  Restrictions/Precautions  Restrictions/Precautions: Weight Bearing, Fall Risk  Lower Extremity Weight Bearing Restrictions  Left Lower Extremity Weight Bearing: Weight Bearing As Tolerated  Position Activity Restriction  Hip Precautions: No hip flexion > 90 degrees, No ADduction, Posterior hip precautions, No hip internal rotation  Other position/activity restrictions: ABductor pillow while in bed and while turning while in hospital, then use regular pillow afterwards; S/p mechanical fall with hip fracture, s/p L hip hemiarthroplasty  Subjective   General  Chart Reviewed: Yes  Additional Pertinent Hx: hearing loss, glaucoma, spinal stenosis, neuropathy, a-fib  Response to previous treatment: Patient with no complaints from previous session  Family / Caregiver Present: No  Diagnosis: s/p L hip hemiarthroplasty  Subjective  Subjective: Pt suoine in bed at arrival,  pleasent and agreeable to OT/PT tx. General Comment  Comments: Pt goes by \"[place]\"  Pain Assessment  Pain Level: 4  Vital Signs  Patient Currently in Pain: Yes   Orientation  Orientation  Overall Orientation Status: Within Normal Limits  Objective    ADL  UE Bathing: Stand by assistance  LE Bathing: Dependent/Total  UE Dressing: Minimal assistance  LE Dressing: Dependent/Total  Toileting: Dependent/Total  Additional Comments: Seated EOB patient washed face, chest and thighs and changed hospital gown. DEP for LB bathing/dressing. Lucero in place.           Balance  Sitting Balance: Stand by assistance  Standing Balance: Dependent/Total  Standing Balance  Time: ~ 12 minutes   Activity: static standing, functional mobility room to/from hallway   Comment: standing, ambulating 24' with MIN A of 2  w/ RW and cues for sequence  Functional Mobility  Functional - Mobility Device: Rolling Walker  Activity: Other  Assist Level: Dependent/Total  Functional Mobility Comments: standing, ambulating 24' with MIN A of 2  w/ RW and cues for sequence  Bed mobility  Rolling to Left: Minimal assistance  Supine to Sit: Minimal assistance  Scooting: Contact guard assistance  Transfers  Stand Step Transfers: Dependent/Total  Sit to stand: Dependent/Total  Stand to sit: Dependent/Total  Transfer Comments: , ambulating 25' with MIN A of 2  w/ RW and cues for sequence, Min A sit<>stands         Plan   Plan  Times per week: 7x  Times per day: Daily  Specific instructions for Next Treatment: cotx, may not need co/tx next tx session   Current Treatment Recommendations: Balance Training, Endurance Training, Functional Mobility Training, Equipment Evaluation, Education, & procurement, Self-Care / ADL, Patient/Caregiver Education & Training, Safety Education & Training  G-Code     OutComes Score                                                  AM-PAC Score        [facility] Inpatient Daily Activity Raw Score: 13 (12/26/19 1222)  AM-PAC Inpatient ADL T-Scale Score : 32.03 (12/26/19 1222)  ADL Inpatient CMS 0-100% Score: 63.03 (12/26/19 1222)  ADL Inpatient CMS G-Code Modifier : CL (12/26/19 1222)    Goals  Short term goals  Time Frame for Short term goals: Discharge  Short term goal 1: SBA for bed mobility.- MIn A for L LE   12/26 /19   Short term goal 2: Min A for functional transfers. - , ambulating 25' with MIN A of 2  w/ RW and cues for sequence, Min A sit<>stands    12/26/19   Short term goal 3: Min A for functional mobility. - , ambulating 25' with MIN A of 2  w/ RW and cues for sequence, Min A sit<>stands   12/26/19

## 2019-12-26 NOTE — DISCHARGE INSTR - COC
Continuity of Care Form    Patient Name: Taylor Sanz   :  1928  MRN:  [id number]    Admit date:  2019  Discharge date:  19    Code Status Order: Full Code   Advance Directives:   Advance Care Flowsheet Documentation     Date/Time Healthcare Directive Type of Healthcare Directive Copy in [facility] [address] Agent's Name Healthcare Agent's Phone Number    19 1159  No, patient does not have an advance directive for healthcare treatment -- -- -- -- --    19 1049  No, patient does not have an advance directive for healthcare treatment -- -- -- -- --    19 1021  No, patient does not have an advance directive for healthcare treatment -- -- -- -- --          Admitting Physician:  Jeremiah Zabala MD  PCP: Jola Bosworth, MD    Discharging Nurse: [facility] Unit/Room#: 5QK-7898/1328-26  Discharging Unit Phone Number: ***    Emergency Contact:   Extended Emergency Contact Information  Primary Emergency Contact: Darwin Quiroz  Address: [address] [place] [address] Phone: [phone]  Relation: Spouse  Secondary Emergency Contact: [address] Phone: [phone]  Relation: Brother/Sister    Past Surgical History:  Past Surgical History:   Procedure Laterality Date    APPENDECTOMY      CATARACT REMOVAL Bilateral 2017    COLON SURGERY      COLONOSCOPY      EYE SURGERY      KIDNEY SURGERY      OVARY REMOVAL         Immunization History: There is no immunization history on file for this patient.     Active Problems:  Patient Active Problem List   Diagnosis Code    Hypertension I10    Palpitations R00.2    Fatigue R53.83    Atrial fibrillation (HCC) I48.91    Anxiety state F41.1    Glaucoma H40.9    Migraine G43.909    Hypothyroid E03.9    Imbalance R26.89    OA (osteoarthritis) of knee M17.10    Weakness of left foot R29.898    Ataxia Concerns:972886487}    Impairments/Disabilities:      [address] Impairments/Disabilities:599537539}    Nutrition Therapy:  Current Nutrition Therapy:   [address] Diet List:153314045}    Routes of Feeding: {CHP DME Other Feedings:515629387}  Liquids: {Slp liquid thickness:26696}  Daily Fluid Restriction: {CHP DME Yes amt example:673061177}  Last Modified Barium Swallow with Video (Video Swallowing Test): {Done Not Done ESOH:153319407}    Treatments at the Time of Hospital Discharge:   Respiratory Treatments: ***  Oxygen Therapy:  {Therapy; copd oxygen:50398}  Ventilator:    {MH CC Vent QKDJ:177413945}    Rehab Therapies: Physical Therapy  Weight Bearing Status/Restrictions: No weight bearing restirctions  Other Medical Equipment (for information only, NOT a DME order):  {EQUIPMENT:064746360}  Other Treatments: Wound Care:  Change dressing daily as needed and cleanse wound with rubbing alcohol and apply dry dressing and tegaderm  If patient has not seen by Dr. Matty Donald (or have appointment) by 2 weeks postop, please call office to speak with Dr. Hector Patel assistant, David Polanco, about staple removal.    DVT prophylaxis:  2.5mg Eliquis BID x 30 days;  then resume 81mg ASA daily. Follow Up: Follow up with Dr. Esteban Hashimoto 2 weeks post op. Call [phone] for appointment. You have demonstrated risk factors for osteoporosis, such as age greater than [de-identified] years and evidence of a fracture. Please see your primary care physician for evaluation for osteoporosis, including consideration for DEXA scanning, if this is felt to be clinically indicated. POSTERIOR HIP PRECAUTIONS:   Dont cross your legs. A pillow or triangular-shaped wedge may be used to block the legs from crossing. Don't roll your leg inward.  Dont bend the hip past ninety degrees. To avoid bending past ninety degrees when sitting in a chair, lean back slightly.        Dont bend over past 90 degrees at the waist, which may occur if you bend over

## 2019-12-27 LAB
BASOPHILS ABSOLUTE: 0 K/UL (ref 0–0.2)
BASOPHILS RELATIVE PERCENT: 0.6 %
EOSINOPHILS ABSOLUTE: 0 K/UL (ref 0–0.6)
EOSINOPHILS RELATIVE PERCENT: 0.5 %
HCT VFR BLD CALC: 28.9 % (ref 36–48)
HEMOGLOBIN: 10 G/DL (ref 12–16)
LYMPHOCYTES ABSOLUTE: 0.8 K/UL (ref 1–5.1)
LYMPHOCYTES RELATIVE PERCENT: 18.3 %
MCH RBC QN AUTO: 32.6 PG (ref 26–34)
MCHC RBC AUTO-ENTMCNC: 34.5 G/DL (ref 31–36)
MCV RBC AUTO: 94.6 FL (ref 80–100)
MONOCYTES ABSOLUTE: 0.4 K/UL (ref 0–1.3)
MONOCYTES RELATIVE PERCENT: 10.3 %
NEUTROPHILS ABSOLUTE: 2.9 K/UL (ref 1.7–7.7)
NEUTROPHILS RELATIVE PERCENT: 70.3 %
PDW BLD-RTO: 12.7 % (ref 12.4–15.4)
PLATELET # BLD: 161 K/UL (ref 135–450)
PMV BLD AUTO: 8.1 FL (ref 5–10.5)
RBC # BLD: 3.06 M/UL (ref 4–5.2)
WBC # BLD: 4.1 K/UL (ref 4–11)

## 2019-12-27 PROCEDURE — 97535 SELF CARE MNGMENT TRAINING: CPT

## 2019-12-27 PROCEDURE — 2580000003 HC RX 258: Performed by: ORTHOPAEDIC SURGERY

## 2019-12-27 PROCEDURE — 6370000000 HC RX 637 (ALT 250 FOR IP): Performed by: NURSE PRACTITIONER

## 2019-12-27 PROCEDURE — 1200000000 HC SEMI PRIVATE

## 2019-12-27 PROCEDURE — APPNB45 APP NON BILLABLE 31-45 MINUTES: Performed by: NURSE PRACTITIONER

## 2019-12-27 PROCEDURE — 94760 N-INVAS EAR/PLS OXIMETRY 1: CPT

## 2019-12-27 PROCEDURE — 97530 THERAPEUTIC ACTIVITIES: CPT

## 2019-12-27 PROCEDURE — 36415 COLL VENOUS BLD VENIPUNCTURE: CPT

## 2019-12-27 PROCEDURE — 6360000002 HC RX W HCPCS: Performed by: ORTHOPAEDIC SURGERY

## 2019-12-27 PROCEDURE — 85025 COMPLETE CBC W/AUTO DIFF WBC: CPT

## 2019-12-27 PROCEDURE — 6370000000 HC RX 637 (ALT 250 FOR IP): Performed by: ORTHOPAEDIC SURGERY

## 2019-12-27 PROCEDURE — 99024 POSTOP FOLLOW-UP VISIT: CPT | Performed by: NURSE PRACTITIONER

## 2019-12-27 PROCEDURE — 97116 GAIT TRAINING THERAPY: CPT

## 2019-12-27 RX ORDER — BISACODYL 10 MG
10 SUPPOSITORY, RECTAL RECTAL DAILY PRN
Status: DISCONTINUED | OUTPATIENT
Start: 2019-12-27 | End: 2020-01-01 | Stop reason: HOSPADM

## 2019-12-27 RX ORDER — TAMSULOSIN HYDROCHLORIDE 0.4 MG/1
0.4 CAPSULE ORAL DAILY
Qty: 30 CAPSULE | Refills: 3 | Status: SHIPPED | OUTPATIENT
Start: 2019-12-28 | End: 2020-07-15

## 2019-12-27 RX ORDER — LORAZEPAM 0.5 MG/1
0.5 TABLET ORAL EVERY 8 HOURS PRN
Qty: 9 TABLET | Refills: 0 | Status: SHIPPED | OUTPATIENT
Start: 2019-12-27 | End: 2020-04-13 | Stop reason: SDUPTHER

## 2019-12-27 RX ADMIN — TAMSULOSIN HYDROCHLORIDE 0.4 MG: 0.4 CAPSULE ORAL at 08:58

## 2019-12-27 RX ADMIN — Medication 10 ML: at 20:21

## 2019-12-27 RX ADMIN — ENOXAPARIN SODIUM 40 MG: 40 INJECTION SUBCUTANEOUS at 09:04

## 2019-12-27 RX ADMIN — DOCUSATE SODIUM 100 MG: 100 CAPSULE ORAL at 09:00

## 2019-12-27 RX ADMIN — METOPROLOL TARTRATE 25 MG: 25 TABLET, FILM COATED ORAL at 20:18

## 2019-12-27 RX ADMIN — HYDROCODONE BITARTRATE AND ACETAMINOPHEN 2 TABLET: 5; 325 TABLET ORAL at 09:01

## 2019-12-27 RX ADMIN — HYDROCODONE BITARTRATE AND ACETAMINOPHEN 2 TABLET: 5; 325 TABLET ORAL at 12:46

## 2019-12-27 RX ADMIN — BETHANECHOL CHLORIDE 10 MG: 10 TABLET ORAL at 12:46

## 2019-12-27 RX ADMIN — SENNOSIDES AND DOCUSATE SODIUM 1 TABLET: 8.6; 5 TABLET ORAL at 20:18

## 2019-12-27 RX ADMIN — SENNOSIDES AND DOCUSATE SODIUM 1 TABLET: 8.6; 5 TABLET ORAL at 09:00

## 2019-12-27 RX ADMIN — ASPIRIN 81 MG 81 MG: 81 TABLET ORAL at 09:00

## 2019-12-27 RX ADMIN — LEVOTHYROXINE SODIUM 50 MCG: 0.03 TABLET ORAL at 05:15

## 2019-12-27 RX ADMIN — LORAZEPAM 0.5 MG: 0.5 TABLET ORAL at 05:15

## 2019-12-27 RX ADMIN — BISACODYL 10 MG: 10 SUPPOSITORY RECTAL at 16:07

## 2019-12-27 RX ADMIN — BETHANECHOL CHLORIDE 10 MG: 10 TABLET ORAL at 05:15

## 2019-12-27 RX ADMIN — BETHANECHOL CHLORIDE 10 MG: 10 TABLET ORAL at 17:09

## 2019-12-27 RX ADMIN — METOPROLOL TARTRATE 25 MG: 25 TABLET, FILM COATED ORAL at 08:58

## 2019-12-27 RX ADMIN — DOCUSATE SODIUM 100 MG: 100 CAPSULE ORAL at 20:18

## 2019-12-27 RX ADMIN — LORAZEPAM 0.5 MG: 0.5 TABLET ORAL at 17:09

## 2019-12-27 ASSESSMENT — PAIN SCALES - GENERAL
PAINLEVEL_OUTOF10: 3
PAINLEVEL_OUTOF10: 3
PAINLEVEL_OUTOF10: 7
PAINLEVEL_OUTOF10: 7
PAINLEVEL_OUTOF10: 2

## 2019-12-27 ASSESSMENT — PAIN DESCRIPTION - PAIN TYPE
TYPE: SURGICAL PAIN

## 2019-12-27 ASSESSMENT — PAIN SCALES - WONG BAKER
WONGBAKER_NUMERICALRESPONSE: 0
WONGBAKER_NUMERICALRESPONSE: 0

## 2019-12-27 ASSESSMENT — PAIN DESCRIPTION - LOCATION
LOCATION: HIP

## 2019-12-27 ASSESSMENT — PAIN DESCRIPTION - ORIENTATION
ORIENTATION: LEFT

## 2019-12-27 ASSESSMENT — PAIN DESCRIPTION - DESCRIPTORS: DESCRIPTORS: SORE

## 2019-12-27 NOTE — PROGRESS NOTES
Physical Therapy  Facility/Department: [address] ORTHO/NEURO NURSING  Daily Treatment Note  NAME: Rosalie Sanchez  : 1928  MRN: [id number]    Date of Service: 2019    Discharge Recommendations:Izzy Amezcua scored a 15/24 on the AM-PAC short mobility form. Current research shows that an AM-PAC score of 17 or less is typically not associated with a discharge to the patient's home setting. Based on the patients AM-PAC score and their current functional mobility deficits, it is recommended that the patient have 3-5 sessions per week of Physical Therapy at d/c to increase the patients independence. PT Equipment Recommendations  Equipment Needed: No  Other: TBD at next level of care           Assessment   Body structures, Functions, Activity limitations: Decreased functional mobility ; Decreased balance;Decreased ADL status; Decreased ROM; Decreased endurance;Decreased high-level IADLs; Increased pain;Decreased strength;Decreased sensation  Assessment: Pt p/op L JEANNE,  she is anxious regarding her situation as she is sole caregiver of her  w/dementia. She performed well with PT this am,  and will benefit from continued PT to address impairments and return to PLOF:  Treatment Diagnosis: decreased  functional mobility for allDL/IADL, impaired gait, transfers  Prognosis: Good  Decision Making: Medium Complexity  History: Pt with Afib  Clinical Presentation: stable,   PT Education: Goals;PT Role;General Safety;Gait Training;Precautions; Functional Mobility Training;Transfer Training  Patient Education: Education and clarification on hip precautions reguarding no bending at waist passed 90. REQUIRES PT FOLLOW UP: Yes  Activity Tolerance  Activity Tolerance: Patient Tolerated treatment well;Patient limited by endurance  Activity Tolerance: Pt fearful, anxious and tearful throughout session, requiring encouragement.      Patient Diagnosis(es): The primary encounter diagnosis was Closed fracture of left hip, initial encounter ([facility] [address] A diagnosis of Anxiety was also pertinent to this visit. has a past medical history of Anxiety, Atrial fibrillation ([address] Disorder of bone and cartilage, unspecified, Fatigue, Hypertension, Migraine, unspecified, without mention of intractable migraine without mention of status migrainosus, Other disorders of kidney and ureter, Peripheral neuropathy, Psychiatric problem, Risk for falls, Spinal stenosis, Thyroid disease, Unspecified glaucoma(365.9), and Unspecified hearing loss. has a past surgical history that includes Colon surgery; Kidney surgery; Ovary removal; Appendectomy; eye surgery; Cataract removal (Bilateral, 04/2017); Colonoscopy; and hip surgery (Left, 12/24/2019). Restrictions  Restrictions/Precautions  Restrictions/Precautions: Weight Bearing, Fall Risk(High Fall Risk )  Lower Extremity Weight Bearing Restrictions  Left Lower Extremity Weight Bearing: Weight Bearing As Tolerated  Position Activity Restriction  Hip Precautions: No hip flexion > 90 degrees, No ADduction, Posterior hip precautions, No hip internal rotation  Other position/activity restrictions: ABductor pillow while in bed and while turning while in hospital, then use regular pillow afterwards; S/p mechanical fall with hip fracture, s/p L hip hemiarthroplasty  Subjective   General  Chart Reviewed: Yes  Response To Previous Treatment: Patient with no complaints from previous session. Family / Caregiver Present: Yes()  Subjective  Subjective: Pt states no pain, recently had pn meds. Agreeable to PT/OT.   General Comment  Comments: Pt supine in bed with HOB elevated upon arrival.  Pain Screening  Patient Currently in Pain: Denies  Vital Signs  Patient Currently in Pain: Denies       Orientation  Orientation  Overall Orientation Status: Within Normal Limits  Cognition      Objective   Bed mobility  Supine to Sit: Minimal assistance(with L LE)  Scooting: Contact guard assistance  Comment: Functional Mobility Training, Safety Education & Training  Safety Devices  Type of devices: Chair alarm in place, Patient at risk for falls, Left in chair, All fall risk precautions in place, Call light within reach, Gait belt  Restraints  Initially in place: No     Therapy Time   Individual Concurrent Group Co-treatment   Time In       1119   Time Out       [address] [place] [address]     I agree with the above note. Goals addressed by PTDomenico Garcia, [place], [place] [address]

## 2019-12-27 NOTE — PROGRESS NOTES
Evening assessment  Complete and vitals taken. Pain reassessed and pt is satisfied. Took pt off tele this afternoon, vitals are stable.

## 2019-12-27 NOTE — PROGRESS NOTES
Occupational Therapy  Facility/Department: [address] ORTHO/NEURO NURSING  Daily Treatment Note  NAME: Daniel Dunbar  : 1928  MRN: [id number]    Date of Service: 2019    Discharge Recommendations:      Daniel Dunbar scored a 16/24 on the AM-PAC ADL Inpatient form. Current research shows that an AM-PAC score of 17 or less is typically not associated with a discharge to the patient's home setting. Based on the patients AM-PAC score and their current ADL deficits, it is recommended that the patient have 3-5 sessions per week of Occupational Therapy at d/c to increase the patients independence. OT Equipment Recommendations  Other: defer to next setting - would need LH shoe horn, sponge, elastic laces, reacher, sock aid    Assessment   Performance deficits / Impairments: Decreased functional mobility ; Decreased ADL status; Decreased endurance;Decreased balance;Decreased high-level IADLs  Assessment: Pt is not at baseline level of function and will benefit from skilled OT in order to address the above limitations. Treatment Diagnosis: Decreased functional mobility, ADL/IADL status, activity tolerance, balance related to s/p L hip hemiarthroplasty  Prognosis: Good  OT Education: OT Role;Plan of Care;Precautions;Transfer Training;ADL Adaptive Strategies  Patient Education: d/c, OT Role, Plan of Care, Precautions, Transfer Training, A/E for LB dressing, ADL Adaptive Strategies  Barriers to Learning: hearing  REQUIRES OT FOLLOW UP: Yes  Activity Tolerance  Activity Tolerance: Patient Tolerated treatment well  Safety Devices  Safety Devices in place: Yes  Type of devices: All fall risk precautions in place;Call light within reach; Chair alarm in place;Gait belt;Patient at risk for falls; Left in chair;Nurse notified         Patient Diagnosis(es): The primary encounter diagnosis was Closed fracture of left hip, initial encounter ([facility] [address] A diagnosis of Anxiety was also pertinent to this visit. has a past medical history of Anxiety, Atrial fibrillation ([facility] [address] Disorder of bone and cartilage, unspecified, Fatigue, Hypertension, Migraine, unspecified, without mention of intractable migraine without mention of status migrainosus, Other disorders of kidney and ureter, Peripheral neuropathy, Psychiatric problem, Risk for falls, Spinal stenosis, Thyroid disease, Unspecified glaucoma(365.9), and Unspecified hearing loss. has a past surgical history that includes Colon surgery; Kidney surgery; Ovary removal; Appendectomy; eye surgery; Cataract removal (Bilateral, 04/2017); Colonoscopy; and hip surgery (Left, 12/24/2019). Restrictions  Restrictions/Precautions  Restrictions/Precautions: Weight Bearing, Fall Risk(High Fall Risk )  Lower Extremity Weight Bearing Restrictions  Left Lower Extremity Weight Bearing: Weight Bearing As Tolerated  Position Activity Restriction  Hip Precautions: No hip flexion > 90 degrees, No ADduction, Posterior hip precautions, No hip internal rotation  Other position/activity restrictions: ABductor pillow while in bed and while turning while in hospital, then use regular pillow afterwards; S/p mechanical fall with hip fracture, s/p L hip hemiarthroplasty  Subjective   General  Chart Reviewed: Yes  Additional Pertinent Hx: hearing loss, glaucoma, spinal stenosis, neuropathy, a-fib  Response to previous treatment: Patient with no complaints from previous session  Family / Caregiver Present: Yes(Spouse)  Diagnosis: s/p L hip hemiarthroplasty  Subjective  Subjective: Pt supine in bed upon arrival, spouse present throughout session. Denies pain at rest and stated she recently received pain medication. Pt tearful with current situation, pt stating \"this never should have happened to me. \"  General Comment  Comments: Pt goes by \"[place]\"      Orientation  Orientation  Overall Orientation Status: Within Normal Limits  Objective    ADL  Grooming: Setup;Stand by assistance  UE Bathing: Setup;Stand by assistance  LE Bathing: Dependent/Total  UE Dressing: Setup;Minimal assistance(doffing/donning gown)  LE Dressing: Setup;Minimal assistance(use of reacher to doff B socks, use of sock aid to alex B socks, cueing for sequence)  Toileting: None  Additional Comments: Pt completed the above ADLs seated EOB.  Pt able to state 3/3 posterior hip precautions-maintained throughout, educated on use of A/E for LB dressing, pt demonstrated well        Balance  Sitting Balance: Stand by assistance  Standing Balance: Contact guard assistance  Standing Balance  Time: ~12 minutes  Activity: functional transfers, functional mobility to/from hallway  Comment: with use of rw, cueing to promote thoracic extension  Functional Mobility  Functional - Mobility Device: Rolling Walker  Activity: Other  Assist Level: Contact guard assistance  Functional Mobility Comments: initially min a of 2 for ambulation, progressing to CGA of 1, tactile cues for rw sequence and management throughout ambulation  Bed mobility  Supine to Sit: Minimal assistance(with L LE)  Scooting: Contact guard assistance  Transfers  Sit to stand: Dependent/Total  Stand to sit: Contact guard assistance  Transfer Comments: x2 sit to stands initially min a + mod a of 2 due to inability to shift weight forward-following cues for sequence-pt requiring CGA of 1 for the following sit to stand/stand to sit transfers-cueing for hand placement throughout     Cognition  Overall Cognitive Status: WFL     Perception  Overall Perceptual Status: [facility]        Plan   Plan  Times per week: 7x  Times per day: Daily  Specific instructions for Next Treatment: no longer co-treat  Current Treatment Recommendations: Balance Training, Endurance Training, Functional Mobility Training, Equipment Evaluation, Education, & procurement, Self-Care / ADL, Patient/Caregiver Education & Training, Safety Education & Training    AM-PAC Score        AM-PAC Inpatient Daily Activity Raw Score: 16 (12/27/19 1217)  AM-PAC Inpatient ADL T-Scale Score : 35.96 (12/27/19 1217)  ADL Inpatient CMS 0-100% Score: 53.32 (12/27/19 1217)  ADL Inpatient CMS G-Code Modifier : CK (12/27/19 1217)    Goals  Short term goals  Time Frame for Short term goals: Discharge  Short term goal 1: SBA for bed mobility.- Min A for L LE   12/27  Short term goal 2: Min A for functional transfers. - , initially min a + mod a of 2 progressing to [facility] 12/27  Short term goal 3: Min A for functional mobility. - , initially min a of 2 progressing to [facility] 12/27  Short term goal 4: Mod A for LB ADLs with AE.-  dependent for LB bathing, A/E for LB dressing-min a 12/27  Short term goal 5: SBA for all UB ADLs. - set up/SBA 12/27  Long term goals  Time Frame for Long term goals : STG=LTG  Patient Goals   Patient goals :  \"To consider senior living apartment with  after rehab\"       Therapy Time   Individual Concurrent Group Co-treatment   Time In       1119   Time Out       1201   Minutes       42         Timed Code Treatment Minutes:  42 minutes  Total Treatment Minutes:  42 minutes    [address]

## 2019-12-27 NOTE — CARE COORDINATION
Met w/pt regarding SNF choice-provided pt w/list.  Pt stated she [facility]'s [facility]. Pt stated [facility] to her home. [facility]ax[facility] referral to [facility] and spoke w/Cassie in admissions to make aware of incoming referral-she will review and notify [facility] of ability to accept. The Plan for Transition of Care is related to the following treatment goals: improve functional status    The Patient and/or patient representative was provided with a choice of provider and agrees   with the discharge plan. [x] Yes [] No    Freedom of choice list was provided with basic dialogue that supports the patient's individualized plan of care/goals, treatment preferences and shares the quality data associated with the providers.  [x] Yes [] No    Electronically signed by HU Hollingsworth on 12/27/2019 at 10:52 AM

## 2019-12-27 NOTE — PROGRESS NOTES
Wt Readings from Last 3 Encounters:   12/24/19 172 lb 6.4 oz (78.2 kg)   11/19/19 145 lb 8 oz (66 kg)   07/20/19 145 lb (65.8 kg)       General appearance:  Appears anxious, tearful at times   Eyes: Sclera clear. Pupils equal.  ENT: Moist oral mucosa. Trachea midline, no adenopathy. Cardiovascular: Regular rhythm, normal S1, S2. No murmur. No edema in lower extremities  Respiratory: Not using accessory muscles. Good inspiratory effort. Clear to auscultation bilaterally, no wheeze or crackles. GI: Abdomen soft, no tenderness, not distended, normal bowel sounds  Musculoskeletal: No cyanosis in digits, neck supple. Neurology: CN 2-12 grossly intact. No speech or motor deficits  Psych: anxious affect. Alert and oriented in time, place and person  Skin: Warm, dry, normal turgor, left hip dressing is clean dry intact no significant edema noted     Labs and Tests:  CBC:   Recent Labs     12/25/19  0713 12/26/19  0627 12/27/19  0612   WBC 7.9 5.0 4.1   HGB 10.9* 10.5* 10.0*    139 161     BMP:    Recent Labs     12/25/19  0714 12/26/19  0627   * 133*   K 4.2 4.7   CL 98* 101   CO2 20* 22   BUN 21* 21*   CREATININE 1.1 1.0   GLUCOSE 137* 107*     Hepatic:   No results for input(s): AST, ALT, ALB, BILITOT, ALKPHOS in the last 72 hours. Problem List  Active Problems:    Closed fracture of left hip (HCC)  Resolved Problems:    * No resolved hospital problems. *       Assessment & Plan:   1. S/p mechanical fall with hip fracture:  POD # 3 from left gerard arthroplasty. On lovenox for DVT prophylaxis  2. Acute blood loss anemia: To be expected. No indication for transfusion  3. Hyponatremia:improving ,  Po solute intake encouraged   4. Hx of PAF:  Has been followed by cardiology and started on low dose BB. Notes reflect a recommendation for low dose eliquis in the post operative period. this was discussed with ortho. .. will transition to eliquis when d/c to [facility]     5.  Urinary retention:  Lucero removed this am for voiding trial.  On flomax and urecholine   6. Hypothyroidism: On levothyroxine,  tsh in range at 2.62   7. Chronic anxiety: on home dose of ativan   8. Stable for d/c .   Awaiting pre cert       Diet: Dietary Nutrition Supplements: Standard High Calorie Oral Supplement  DIET GENERAL;  Code:Full Code  DVT PPX      LEILANI Mccord - CNP   12/27/2019 1:10 PM

## 2019-12-27 NOTE — PROGRESS NOTES
Shift assessment and AM vitals complete, AM meds given, pt given pain meds and ativan. Pt will get up with therapy.

## 2019-12-28 PROCEDURE — 1200000000 HC SEMI PRIVATE

## 2019-12-28 PROCEDURE — 6370000000 HC RX 637 (ALT 250 FOR IP): Performed by: NURSE PRACTITIONER

## 2019-12-28 PROCEDURE — 97110 THERAPEUTIC EXERCISES: CPT

## 2019-12-28 PROCEDURE — 2580000003 HC RX 258: Performed by: ORTHOPAEDIC SURGERY

## 2019-12-28 PROCEDURE — 97530 THERAPEUTIC ACTIVITIES: CPT

## 2019-12-28 PROCEDURE — 6370000000 HC RX 637 (ALT 250 FOR IP): Performed by: ORTHOPAEDIC SURGERY

## 2019-12-28 PROCEDURE — 6360000002 HC RX W HCPCS: Performed by: ORTHOPAEDIC SURGERY

## 2019-12-28 PROCEDURE — 97116 GAIT TRAINING THERAPY: CPT

## 2019-12-28 PROCEDURE — 94760 N-INVAS EAR/PLS OXIMETRY 1: CPT

## 2019-12-28 RX ADMIN — HYDROCODONE BITARTRATE AND ACETAMINOPHEN 1 TABLET: 5; 325 TABLET ORAL at 10:58

## 2019-12-28 RX ADMIN — HYDROCODONE BITARTRATE AND ACETAMINOPHEN 1 TABLET: 5; 325 TABLET ORAL at 00:43

## 2019-12-28 RX ADMIN — ASPIRIN 81 MG 81 MG: 81 TABLET ORAL at 10:46

## 2019-12-28 RX ADMIN — Medication 10 ML: at 11:00

## 2019-12-28 RX ADMIN — LEVOTHYROXINE SODIUM 50 MCG: 0.03 TABLET ORAL at 05:13

## 2019-12-28 RX ADMIN — SENNOSIDES AND DOCUSATE SODIUM 1 TABLET: 8.6; 5 TABLET ORAL at 10:47

## 2019-12-28 RX ADMIN — BETHANECHOL CHLORIDE 10 MG: 10 TABLET ORAL at 13:21

## 2019-12-28 RX ADMIN — BETHANECHOL CHLORIDE 10 MG: 10 TABLET ORAL at 05:13

## 2019-12-28 RX ADMIN — DOCUSATE SODIUM 100 MG: 100 CAPSULE ORAL at 10:47

## 2019-12-28 RX ADMIN — METOPROLOL TARTRATE 25 MG: 25 TABLET, FILM COATED ORAL at 21:23

## 2019-12-28 RX ADMIN — LORAZEPAM 0.5 MG: 0.5 TABLET ORAL at 00:44

## 2019-12-28 RX ADMIN — LORAZEPAM 0.5 MG: 0.5 TABLET ORAL at 21:23

## 2019-12-28 RX ADMIN — METOPROLOL TARTRATE 25 MG: 25 TABLET, FILM COATED ORAL at 10:47

## 2019-12-28 RX ADMIN — TAMSULOSIN HYDROCHLORIDE 0.4 MG: 0.4 CAPSULE ORAL at 10:47

## 2019-12-28 RX ADMIN — HYDROCODONE BITARTRATE AND ACETAMINOPHEN 1 TABLET: 5; 325 TABLET ORAL at 05:13

## 2019-12-28 RX ADMIN — LORAZEPAM 0.5 MG: 0.5 TABLET ORAL at 10:47

## 2019-12-28 RX ADMIN — BETHANECHOL CHLORIDE 10 MG: 10 TABLET ORAL at 21:23

## 2019-12-28 RX ADMIN — ENOXAPARIN SODIUM 40 MG: 40 INJECTION SUBCUTANEOUS at 10:48

## 2019-12-28 RX ADMIN — HYDROCODONE BITARTRATE AND ACETAMINOPHEN 2 TABLET: 5; 325 TABLET ORAL at 22:47

## 2019-12-28 ASSESSMENT — PAIN DESCRIPTION - LOCATION
LOCATION: HIP

## 2019-12-28 ASSESSMENT — PAIN DESCRIPTION - ONSET
ONSET: ON-GOING

## 2019-12-28 ASSESSMENT — PAIN DESCRIPTION - PAIN TYPE
TYPE: SURGICAL PAIN

## 2019-12-28 ASSESSMENT — PAIN DESCRIPTION - PROGRESSION
CLINICAL_PROGRESSION: NOT CHANGED

## 2019-12-28 ASSESSMENT — PAIN DESCRIPTION - FREQUENCY
FREQUENCY: CONTINUOUS
FREQUENCY: INTERMITTENT

## 2019-12-28 ASSESSMENT — PAIN SCALES - GENERAL
PAINLEVEL_OUTOF10: 10
PAINLEVEL_OUTOF10: 4
PAINLEVEL_OUTOF10: 4
PAINLEVEL_OUTOF10: 1
PAINLEVEL_OUTOF10: 3
PAINLEVEL_OUTOF10: 1
PAINLEVEL_OUTOF10: 4

## 2019-12-28 ASSESSMENT — PAIN DESCRIPTION - DESCRIPTORS
DESCRIPTORS: SORE;ACHING
DESCRIPTORS: SORE

## 2019-12-28 ASSESSMENT — PAIN DESCRIPTION - ORIENTATION
ORIENTATION: LEFT

## 2019-12-28 NOTE — PROGRESS NOTES
Shift assessment and AM vitals complete. AM meds given. Pt feeling pressure in bladder, will bladder scan. Therapy worked with pt today, said she did well, but did not have a lot of urine output. Pain is controlled.

## 2019-12-28 NOTE — PROGRESS NOTES
treatment  Vital Signs  Patient Currently in Pain: Yes   Orientation  Orientation  Overall Orientation Status: Within Functional Limits  Objective    ADL  Additional Comments: ADLs not addressed during session        Balance  Sitting Balance: Supervision  Standing Balance: Contact guard assistance(min A while turning with RW)  Standing Balance  Time: x4-5 min, x30 sec  Activity: functional mobility in room, sit<>stand to/from chair  Comment: slightly unsteady while turning in tight corner with RW, min A  Functional Mobility  Functional - Mobility Device: Rolling Walker  Activity: Other  Assist Level: Contact guard assistance  Functional Mobility Comments: x~15' with tight turn, cues for small steps to avoid pivoting/internal rotation  Bed mobility  Supine to Sit: Contact guard assistance(at LLE)  Scooting: Stand by assistance  Transfers  Sit to stand:  Moderate assistance(from EOB, min A from chair)  Stand to sit: Minimal assistance(into chair x2)  Transfer Comments: min cues for hand placement and to extend LLE                       Cognition  Overall Cognitive Status: [address]  Times per week: 7x  Times per day: Daily  Specific instructions for Next Treatment: no longer co-treat  Current Treatment Recommendations: Balance Training, Endurance Training, Functional Mobility Training, Equipment Evaluation, Education, & procurement, Self-Care / ADL, Patient/Caregiver Education & Training, Safety Education & Training    AM-PAC Score        AM-[facility] Inpatient Daily Activity Raw Score: 16 (12/28/19 1522)  AM-PAC Inpatient ADL T-Scale Score : 35.96 (12/28/19 1522)  ADL Inpatient CMS 0-100% Score: 53.32 (12/28/19 1522)  ADL Inpatient CMS G-Code Modifier : CK (12/28/19 1522)    Goals  Short term goals  Time Frame for Short term goals: Discharge  Short term goal 1: SBA for bed mobility. - CGA supine>sit 12/28  Short term goal 2: Min A for functional transfers.- mod A from EOB, min A

## 2019-12-28 NOTE — CARE COORDINATION
SW left message with [facility] admissions asking for an update on pt's precert for SNF.     Electronically signed by HU Torrez, ZULAY on 12/28/2019 at 9:31 AM

## 2019-12-28 NOTE — PROGRESS NOTES
Shift assessment complete. Patient alert and oriented. Neuro check WDL. Patient stand by assist with walker. Assisted patient to the bedside commode. Tolerated well. reviewed medications. The care plan and education has been reviewed and mutually agreed upon with the patient. Patient remains free from falls or accidental injury. Room free from clutter. All fall precautions in place. Bed in lowest position with wheels locked and alarm on, call light and belongings within reach.

## 2019-12-28 NOTE — PROGRESS NOTES
[facility]    12/28/2019 8:09 AM        Name: Fabiola Rosas . Admitted: 12/23/2019  Primary Care Provider: Phill Hull MD (Tel: [phone])      Subjective:       Lucero removed on Friday   Now voiding qs  In better spirits today  Less anxious   with dementia at the bedside       Reviewed interval ancillary notes    Current Medications  bisacodyl (DULCOLAX) suppository 10 mg, Daily PRN  tamsulosin (FLOMAX) capsule 0.4 mg, Daily  bethanechol (URECHOLINE) tablet 10 mg, TID  sodium chloride flush 0.9 % injection 10 mL, 2 times per day  sodium chloride flush 0.9 % injection 10 mL, PRN  docusate sodium (COLACE) capsule 100 mg, BID  sennosides-docusate sodium (SENOKOT-S) 8.6-50 MG tablet 1 tablet, BID  magnesium hydroxide (MILK OF MAGNESIA) 400 MG/5ML suspension 30 mL, Daily PRN  ondansetron (ZOFRAN) injection 4 mg, Q6H PRN  enoxaparin (LOVENOX) injection 40 mg, Daily  aspirin chewable tablet 81 mg, Daily  levothyroxine (SYNTHROID) tablet 50 mcg, QAM AC  LORazepam (ATIVAN) tablet 0.5 mg, Q8H PRN  polyethylene glycol (GLYCOLAX) packet 17 g, Daily PRN  acetaminophen (TYLENOL) tablet 650 mg, Q4H PRN  HYDROcodone-acetaminophen (NORCO) 5-325 MG per tablet 1 tablet, Q4H PRN    Or  HYDROcodone-acetaminophen (NORCO) 5-325 MG per tablet 2 tablet, Q4H PRN  HYDROmorphone HCl PF (DILAUDID) injection 0.25 mg, Q3H PRN    Or  HYDROmorphone HCl PF (DILAUDID) injection 0.5 mg, Q3H PRN  metoprolol tartrate (LOPRESSOR) tablet 25 mg, BID        Objective:  /70   Pulse 82   Temp 98.6 °F (37 °C) (Oral)   Resp 16   Ht 5' 6\" (1.676 m)   Wt 172 lb 6.4 oz (78.2 kg)   SpO2 97%   BMI 27.83 kg/m²     Intake/Output Summary (Last 24 hours) at 12/28/2019 0809  Last data filed at 12/28/2019 0515  Gross per 24 hour   Intake 240 ml   Output 800 ml   Net -560 ml      Wt Readings from Last 3 Encounters:   12/24/19

## 2019-12-29 PROCEDURE — 6370000000 HC RX 637 (ALT 250 FOR IP): Performed by: ORTHOPAEDIC SURGERY

## 2019-12-29 PROCEDURE — 1200000000 HC SEMI PRIVATE

## 2019-12-29 PROCEDURE — 97530 THERAPEUTIC ACTIVITIES: CPT

## 2019-12-29 PROCEDURE — 97116 GAIT TRAINING THERAPY: CPT

## 2019-12-29 PROCEDURE — 6360000002 HC RX W HCPCS: Performed by: ORTHOPAEDIC SURGERY

## 2019-12-29 PROCEDURE — 97110 THERAPEUTIC EXERCISES: CPT

## 2019-12-29 PROCEDURE — 6370000000 HC RX 637 (ALT 250 FOR IP): Performed by: NURSE PRACTITIONER

## 2019-12-29 RX ADMIN — HYDROCODONE BITARTRATE AND ACETAMINOPHEN 1 TABLET: 5; 325 TABLET ORAL at 09:15

## 2019-12-29 RX ADMIN — BETHANECHOL CHLORIDE 10 MG: 10 TABLET ORAL at 05:42

## 2019-12-29 RX ADMIN — ENOXAPARIN SODIUM 40 MG: 40 INJECTION SUBCUTANEOUS at 09:24

## 2019-12-29 RX ADMIN — LORAZEPAM 0.5 MG: 0.5 TABLET ORAL at 18:17

## 2019-12-29 RX ADMIN — BETHANECHOL CHLORIDE 10 MG: 10 TABLET ORAL at 18:15

## 2019-12-29 RX ADMIN — LEVOTHYROXINE SODIUM 50 MCG: 0.03 TABLET ORAL at 05:42

## 2019-12-29 RX ADMIN — METOPROLOL TARTRATE 25 MG: 25 TABLET, FILM COATED ORAL at 21:38

## 2019-12-29 RX ADMIN — METOPROLOL TARTRATE 25 MG: 25 TABLET, FILM COATED ORAL at 09:23

## 2019-12-29 RX ADMIN — BETHANECHOL CHLORIDE 10 MG: 10 TABLET ORAL at 14:31

## 2019-12-29 RX ADMIN — ASPIRIN 81 MG 81 MG: 81 TABLET ORAL at 09:23

## 2019-12-29 RX ADMIN — HYDROCODONE BITARTRATE AND ACETAMINOPHEN 2 TABLET: 5; 325 TABLET ORAL at 21:38

## 2019-12-29 RX ADMIN — LORAZEPAM 0.5 MG: 0.5 TABLET ORAL at 07:40

## 2019-12-29 RX ADMIN — TAMSULOSIN HYDROCHLORIDE 0.4 MG: 0.4 CAPSULE ORAL at 09:23

## 2019-12-29 ASSESSMENT — PAIN DESCRIPTION - ONSET: ONSET: ON-GOING

## 2019-12-29 ASSESSMENT — PAIN DESCRIPTION - PROGRESSION: CLINICAL_PROGRESSION: NOT CHANGED

## 2019-12-29 ASSESSMENT — PAIN SCALES - GENERAL
PAINLEVEL_OUTOF10: 7
PAINLEVEL_OUTOF10: 0
PAINLEVEL_OUTOF10: 4
PAINLEVEL_OUTOF10: 0
PAINLEVEL_OUTOF10: 4

## 2019-12-29 ASSESSMENT — PAIN DESCRIPTION - DESCRIPTORS: DESCRIPTORS: SORE;ACHING

## 2019-12-29 ASSESSMENT — PAIN DESCRIPTION - LOCATION: LOCATION: HIP

## 2019-12-29 ASSESSMENT — PAIN DESCRIPTION - ORIENTATION: ORIENTATION: LEFT

## 2019-12-29 ASSESSMENT — PAIN DESCRIPTION - PAIN TYPE: TYPE: SURGICAL PAIN

## 2019-12-29 ASSESSMENT — PAIN DESCRIPTION - FREQUENCY: FREQUENCY: CONTINUOUS

## 2019-12-29 NOTE — PROGRESS NOTES
Denies       Orientation  Orientation  Overall Orientation Status: Within Normal Limits  Cognition   Cognition  Overall Cognitive Status: WFL  Objective   Bed mobility  Supine to Sit: Contact guard assistance(at LLE)  Sit to Supine: Minimal assistance(assist with LLE )  Scooting: Stand by assistance  Transfers  Stand to sit: Contact guard assistance  Bed to Chair: Contact guard assistance  Comment: Pt sit to stand from bed with CGAx1, cues for hand placement, especially when return to sit. In restroom, pt feeling needing to stand to \"take pressure off\" to urinate, sit to stand from commode x 4 while attempting to use restroom, increased urine input noted this date. Ambulation  Ambulation?: Yes  Ambulation 1  Surface: level tile  Device: Rolling Walker  Assistance: Contact guard assistance  Quality of Gait: Pt with improved control of gait, step to gait pattern, slow gait   Gait Deviations: Slow Amalia;Decreased step length;Decreased step height  Distance: 25 feet x 1 and 10 feet x 1   Comments: \"I just feel so weak when walking today\"  Pt with decreased cues for sequencing of gait this date   Stairs/Curb  Stairs?: No     Balance  Posture: Good  Sitting - Static: Good  Sitting - Dynamic: Good  Standing - Static: Good;-  Standing - Dynamic: Good;-  Comments: Pt sat EOB x 10 minutes with S to complete hygiene and grooming tasks. Exercises  Straight Leg Raise: x 10 with assist   Quad Sets: x 10   Heel slides: x 10 with assist LLE   Gluteal Sets: x 10   Knee Long Arc Quad: x 10 in sitting   Ankle Pumps: x 20    AROM RLE (degrees)  RLE AROM: WFL  AROM LLE (degrees)  LLE General AROM: L hip limited due to recent sx;  all knee ankle ROM WFL  Strength RLE  Strength RLE: WFL  Strength LLE  Comment: Grossly 3-/5 quad and hip flexion   Comment: Pt toilet with mod assist for hygiene tasks and clothing management.                 G-Code     OutComes Score                                                     AM-PAC

## 2019-12-29 NOTE — PROGRESS NOTES
Wt Readings from Last 3 Encounters:   12/24/19 172 lb 6.4 oz (78.2 kg)   11/19/19 145 lb 8 oz (66 kg)   07/20/19 145 lb (65.8 kg)       General appearance:  Appears comfortable. Looks better ,  No current anxiety   Eyes: Sclera clear. Pupils equal.  ENT: Moist oral mucosa. Trachea midline, no adenopathy. Cardiovascular: Regular rhythm, normal S1, S2. No murmur. No edema in lower extremities  Respiratory: Not using accessory muscles. Good inspiratory effort. Clear to auscultation bilaterally, no wheeze or crackles. GI: Abdomen soft, no tenderness, not distended, normal bowel sounds  Musculoskeletal: No cyanosis in digits, neck supple. Neurology: CN 2-12 grossly intact. No speech or motor deficits  Psych: anxious affect. Alert and oriented in time, place and person  Skin: Warm, dry, normal turgor, left hip dressing is clean dry intact trace edema noted left thigh trace pedal edema noted bilaterally      Labs and Tests:  CBC:   Recent Labs     12/27/19  0612   WBC 4.1   HGB 10.0*        BMP:    No results for input(s): NA, K, CL, CO2, BUN, CREATININE, GLUCOSE in the last 72 hours. Hepatic:   No results for input(s): AST, ALT, ALB, BILITOT, ALKPHOS in the last 72 hours. Problem List  Active Problems:    Closed fracture of left hip (HCC)  Resolved Problems:    * No resolved hospital problems. *       Assessment & Plan:   1. S/p mechanical fall with hip fracture:  POD # 5 from left gerard arthroplasty. Awaiting pre cert for placement   2. Acute blood loss anemia: To be expected. No indication for transfusion  3. Hyponatremia: improved  4. Hx of PAF:  Has been followed by cardiology and started on low dose BB. Notes reflect a recommendation for low dose eliquis in the post operative period. this was discussed with ortho. .. will transition to eliquis when d/c to [facility]     5. Urinary retention: now voiding qs since carranza removed   6. Hypothyroidism: On levothyroxine,  tsh in range at 2.62   7.  Chronic anxiety: on home dose of ativan   8. Stable for d/c .   Awaiting pre cert       Diet: Dietary Nutrition Supplements: Standard High Calorie Oral Supplement  DIET GENERAL;  Code:Full Code  DVT PPX      LEILANI Mccord CNP   12/29/2019 7:56 AM

## 2019-12-29 NOTE — PLAN OF CARE
Problem: Pain:  Goal: Pain level will decrease  Description  Pain level will decrease  Outcome: Ongoing  Goal: Control of acute pain  Description  Control of acute pain  Outcome: Ongoing  Goal: Control of chronic pain  Description  Control of chronic pain  Outcome: Ongoing     Problem: Falls - Risk of:  Goal: Will remain free from falls  Description  Will remain free from falls  Outcome: Ongoing  Goal: Absence of physical injury  Description  Absence of physical injury  Outcome: Ongoing     Problem: Risk for Impaired Skin Integrity  Goal: Tissue integrity - skin and mucous membranes  Description  Structural intactness and normal physiological function of skin and  mucous membranes.   Outcome: Ongoing     Problem: Discharge Planning:  Goal: Knowledge of discharge instructions  Description  Knowledge of discharge instructions  Outcome: Ongoing     Problem: Infection - Surgical Site:  Goal: Signs of wound healing will improve  Description  Signs of wound healing will improve  Outcome: Ongoing     Problem: Mobility - Impaired:  Goal: Achieve maximum mobility level  Description  Achieve maximum mobility level  Outcome: Ongoing     Problem: Cardiac:  Goal: Ability to maintain an adequate cardiac output will improve  Description  Ability to maintain an adequate cardiac output will improve  Outcome: Ongoing  Goal: Hemodynamic stability will improve  Description  Hemodynamic stability will improve  Outcome: Ongoing

## 2019-12-30 LAB
ANION GAP SERPL CALCULATED.3IONS-SCNC: 10 MMOL/L (ref 3–16)
BASOPHILS ABSOLUTE: 0 K/UL (ref 0–0.2)
BASOPHILS RELATIVE PERCENT: 0.4 %
BUN BLDV-MCNC: 18 MG/DL (ref 7–20)
CALCIUM SERPL-MCNC: 8.8 MG/DL (ref 8.3–10.6)
CHLORIDE BLD-SCNC: 98 MMOL/L (ref 99–110)
CO2: 24 MMOL/L (ref 21–32)
CREAT SERPL-MCNC: 1.1 MG/DL (ref 0.6–1.2)
EOSINOPHILS ABSOLUTE: 0 K/UL (ref 0–0.6)
EOSINOPHILS RELATIVE PERCENT: 0.4 %
GFR AFRICAN AMERICAN: 56
GFR NON-AFRICAN AMERICAN: 46
GLUCOSE BLD-MCNC: 110 MG/DL (ref 70–99)
HCT VFR BLD CALC: 29.7 % (ref 36–48)
HEMOGLOBIN: 10.1 G/DL (ref 12–16)
LYMPHOCYTES ABSOLUTE: 0.7 K/UL (ref 1–5.1)
LYMPHOCYTES RELATIVE PERCENT: 16.2 %
MCH RBC QN AUTO: 32.4 PG (ref 26–34)
MCHC RBC AUTO-ENTMCNC: 33.9 G/DL (ref 31–36)
MCV RBC AUTO: 95.5 FL (ref 80–100)
MONOCYTES ABSOLUTE: 0.4 K/UL (ref 0–1.3)
MONOCYTES RELATIVE PERCENT: 8.8 %
NEUTROPHILS ABSOLUTE: 3 K/UL (ref 1.7–7.7)
NEUTROPHILS RELATIVE PERCENT: 74.2 %
PDW BLD-RTO: 12.8 % (ref 12.4–15.4)
PLATELET # BLD: 247 K/UL (ref 135–450)
PMV BLD AUTO: 7.8 FL (ref 5–10.5)
POTASSIUM SERPL-SCNC: 4.5 MMOL/L (ref 3.5–5.1)
RBC # BLD: 3.11 M/UL (ref 4–5.2)
SODIUM BLD-SCNC: 132 MMOL/L (ref 136–145)
WBC # BLD: 4 K/UL (ref 4–11)

## 2019-12-30 PROCEDURE — 99024 POSTOP FOLLOW-UP VISIT: CPT | Performed by: NURSE PRACTITIONER

## 2019-12-30 PROCEDURE — 6370000000 HC RX 637 (ALT 250 FOR IP): Performed by: NURSE PRACTITIONER

## 2019-12-30 PROCEDURE — 6360000002 HC RX W HCPCS: Performed by: ORTHOPAEDIC SURGERY

## 2019-12-30 PROCEDURE — APPNB30 APP NON BILLABLE TIME 0-30 MINS: Performed by: NURSE PRACTITIONER

## 2019-12-30 PROCEDURE — 97530 THERAPEUTIC ACTIVITIES: CPT

## 2019-12-30 PROCEDURE — 6370000000 HC RX 637 (ALT 250 FOR IP): Performed by: ORTHOPAEDIC SURGERY

## 2019-12-30 PROCEDURE — 97535 SELF CARE MNGMENT TRAINING: CPT

## 2019-12-30 PROCEDURE — 36415 COLL VENOUS BLD VENIPUNCTURE: CPT

## 2019-12-30 PROCEDURE — 80048 BASIC METABOLIC PNL TOTAL CA: CPT

## 2019-12-30 PROCEDURE — 1200000000 HC SEMI PRIVATE

## 2019-12-30 PROCEDURE — 97116 GAIT TRAINING THERAPY: CPT

## 2019-12-30 PROCEDURE — 85025 COMPLETE CBC W/AUTO DIFF WBC: CPT

## 2019-12-30 RX ADMIN — DOCUSATE SODIUM 100 MG: 100 CAPSULE ORAL at 09:46

## 2019-12-30 RX ADMIN — METOPROLOL TARTRATE 25 MG: 25 TABLET, FILM COATED ORAL at 20:33

## 2019-12-30 RX ADMIN — ENOXAPARIN SODIUM 40 MG: 40 INJECTION SUBCUTANEOUS at 09:45

## 2019-12-30 RX ADMIN — METOPROLOL TARTRATE 25 MG: 25 TABLET, FILM COATED ORAL at 09:46

## 2019-12-30 RX ADMIN — LORAZEPAM 0.5 MG: 0.5 TABLET ORAL at 17:07

## 2019-12-30 RX ADMIN — LEVOTHYROXINE SODIUM 50 MCG: 0.03 TABLET ORAL at 05:22

## 2019-12-30 RX ADMIN — LORAZEPAM 0.5 MG: 0.5 TABLET ORAL at 05:24

## 2019-12-30 RX ADMIN — HYDROCODONE BITARTRATE AND ACETAMINOPHEN 1 TABLET: 5; 325 TABLET ORAL at 05:24

## 2019-12-30 RX ADMIN — ASPIRIN 81 MG 81 MG: 81 TABLET ORAL at 09:46

## 2019-12-30 RX ADMIN — BETHANECHOL CHLORIDE 10 MG: 10 TABLET ORAL at 05:23

## 2019-12-30 RX ADMIN — SENNOSIDES AND DOCUSATE SODIUM 1 TABLET: 8.6; 5 TABLET ORAL at 09:45

## 2019-12-30 ASSESSMENT — PAIN DESCRIPTION - ORIENTATION
ORIENTATION: LEFT
ORIENTATION: LEFT

## 2019-12-30 ASSESSMENT — PAIN DESCRIPTION - PAIN TYPE
TYPE: SURGICAL PAIN
TYPE: SURGICAL PAIN

## 2019-12-30 ASSESSMENT — PAIN DESCRIPTION - ONSET: ONSET: ON-GOING

## 2019-12-30 ASSESSMENT — PAIN SCALES - GENERAL
PAINLEVEL_OUTOF10: 4
PAINLEVEL_OUTOF10: 0
PAINLEVEL_OUTOF10: 2
PAINLEVEL_OUTOF10: 2

## 2019-12-30 ASSESSMENT — PAIN DESCRIPTION - PROGRESSION: CLINICAL_PROGRESSION: NOT CHANGED

## 2019-12-30 ASSESSMENT — PAIN DESCRIPTION - LOCATION
LOCATION: HIP
LOCATION: HIP

## 2019-12-30 ASSESSMENT — PAIN DESCRIPTION - FREQUENCY: FREQUENCY: CONTINUOUS

## 2019-12-30 ASSESSMENT — PAIN DESCRIPTION - DESCRIPTORS: DESCRIPTORS: SORE

## 2019-12-30 NOTE — PLAN OF CARE
Problem: Pain:  Goal: Pain level will decrease  Description  Pain level will decrease  12/29/2019 2317 by Martha Seals RN  Outcome: Ongoing  Pt has PRN pain medication available upon request. Pt aware to let nursing know when pain medication is needed. 12/29/2019 1830 by Loco Martinez RN  Outcome: Ongoing  Goal: Control of acute pain  Description  Control of acute pain  12/29/2019 2317 by Martha Seals RN  Outcome: Ongoing  12/29/2019 1830 by Loco Martinez RN  Outcome: Ongoing  Goal: Control of chronic pain  Description  Control of chronic pain  12/29/2019 2317 by Martha Seals RN  Outcome: Ongoing  12/29/2019 1830 by Loco Martinez RN  Outcome: Ongoing     Problem: Falls - Risk of:  Goal: Will remain free from falls  Description  Will remain free from falls  12/29/2019 2317 by Martha Seals RN  Outcome: Ongoing  Fall precautions in place, hourly rounding, call light and belongings in reach, bed in lowest position, wheels locked in place, side rails up x 2, walkways free of clutter    12/29/2019 1830 by Loco Martinez RN  Outcome: Ongoing  Goal: Absence of physical injury  Description  Absence of physical injury  12/29/2019 2317 by Martha Seals RN  Outcome: Ongoing  12/29/2019 1830 by Loco Martinez RN  Outcome: Ongoing     Problem: Risk for Impaired Skin Integrity  Goal: Tissue integrity - skin and mucous membranes  Description  Structural intactness and normal physiological function of skin and  mucous membranes. 12/29/2019 2317 by Martha Seals RN  Outcome: Ongoing  Skin remains dry and intact, no signs of breakdown noted. Patient encouraged to reposition every 2 hours and is assisted to do so when unable to reposition independently.      12/29/2019 1830 by Loco Martinez RN  Outcome: Ongoing     Problem: Discharge Planning:  Goal: Knowledge of discharge instructions  Description  Knowledge of discharge instructions  12/29/2019 2317 by Martha Seals RN  Outcome: Ongoing  12/29/2019 1830 by Loco Martinez RN  Outcome: Ongoing     Problem: Infection - Surgical Site:  Goal: Signs of wound healing will improve  Description  Signs of wound healing will improve  12/29/2019 2317 by Thu Andrade RN  Outcome: Ongoing  Dressing to incision remains CDI  12/29/2019 1830 by Lizeth Marquez RN  Outcome: Ongoing     Problem: Mobility - Impaired:  Goal: Achieve maximum mobility level  Description  Achieve maximum mobility level  12/29/2019 2317 by Thu Andrade RN  Outcome: Ongoing  Pt able to reposition self in bed, and maintains proper body alignment.  Transfers and ambulates well with walker  12/29/2019 1830 by Lizeth Marquez RN  Outcome: Ongoing     Problem: Cardiac:  Goal: Ability to maintain an adequate cardiac output will improve  Description  Ability to maintain an adequate cardiac output will improve  12/29/2019 2317 by Thu Andrade RN  Outcome: Ongoing  VSS  12/29/2019 1830 by Lizeth Marquez RN  Outcome: Ongoing  Goal: Hemodynamic stability will improve  Description  Hemodynamic stability will improve  12/29/2019 2317 by Thu Andrade RN  Outcome: Ongoing  12/29/2019 1830 by Lizeth Marquez RN  Outcome: Ongoing

## 2019-12-30 NOTE — PLAN OF CARE
Problem: Pain:  Goal: Pain level will decrease  Description  Pain level will decrease  Outcome: Ongoing     Problem: Pain:  Goal: Control of acute pain  Description  Control of acute pain  Outcome: Ongoing     Problem: Falls - Risk of:  Goal: Will remain free from falls  Description  Will remain free from falls  Outcome: Ongoing     Problem: Falls - Risk of:  Goal: Absence of physical injury  Description  Absence of physical injury  Outcome: Ongoing

## 2019-12-30 NOTE — PROGRESS NOTES
[facility] 2: Stand by assistance  Quality of Gait 2: step through pattern  Gait Deviations: Slow Amalia;Decreased step height;Decreased step length  Distance: 30'  Stairs/Curb  Stairs?: No     Balance  Posture: Good  Sitting - Static: Good  Sitting - Dynamic: Good  Standing - Static: Fair;+(with RW)  Standing - Dynamic: Fair;+(with RW)  Exercises  Gluteal Sets: 2 x10   Knee Long Arc Quad: 2 x10 BLE  Ankle Pumps: 2 x10 BLE         Comment: educated on HEP and its frequency              G-Code     OutComes Score                                                     AM-PAC Score  AM-PAC Inpatient Mobility Raw Score : 17 (12/30/19 1351)  AM-PAC Inpatient T-Scale Score : 42.13 (12/30/19 1351)  Mobility Inpatient CMS 0-100% Score: 50.57 (12/30/19 1351)  Mobility Inpatient CMS G-Code Modifier : CK (12/30/19 1351)          Goals  Short term goals  Time Frame for Short term goals: by discharged  Short term goal 1: Pt to demonstrate SBA for all transfers--met 12/30  Short term goal 2: Pt to AMB with LRD for 100 feet to allow return to PLOF  Short term goal 3: all Bed mobility SBA x 1  Short term goal 4: Pt will complete transfers with mod I  Patient Goals   Patient goals : pt goal is to return to PLOF so she can care for her   **1 goal met this date    Plan    Plan  Times per week: 7x  Times per day: Daily  Plan weeks: until DC  Current Treatment Recommendations: Strengthening, Transfer Training, Neuromuscular Re-education, Patient/Caregiver Education & Training, Balance Training, Gait Training, Home Exercise Program, Functional Mobility Training, Safety Education & Training  Safety Devices  Type of devices:  All fall risk precautions in place, Call light within reach, Nurse notified, Left in bed, Chair alarm in place, Left in chair  Restraints  Initially in place: No     Therapy Time   Individual Concurrent Group Co-treatment   Time In 1312         Time Out 1335         Minutes 23         Timed Code Treatment Minutes: [address], [address]  I agree with the above note and have addressed this patient's goals.   Janet Curry, PT, DPT, [id number]

## 2019-12-30 NOTE — PROGRESS NOTES
initial encounter [facility]). A diagnosis of Anxiety was also pertinent to this visit. has a past medical history of Anxiety, Atrial fibrillation ([address] Disorder of bone and cartilage, unspecified, Fatigue, Hypertension, Migraine, unspecified, without mention of intractable migraine without mention of status migrainosus, Other disorders of kidney and ureter, Peripheral neuropathy, Psychiatric problem, Risk for falls, Spinal stenosis, Thyroid disease, Unspecified glaucoma(365.9), and Unspecified hearing loss. has a past surgical history that includes Colon surgery; Kidney surgery; Ovary removal; Appendectomy; eye surgery; Cataract removal (Bilateral, 04/2017); Colonoscopy; and hip surgery (Left, 12/24/2019). Restrictions  Restrictions/Precautions  Restrictions/Precautions: Weight Bearing, Fall Risk(high fall risk)  Lower Extremity Weight Bearing Restrictions  Left Lower Extremity Weight Bearing: Weight Bearing As Tolerated  Position Activity Restriction  Hip Precautions: No hip flexion > 90 degrees, No ADduction, Posterior hip precautions, No hip internal rotation  Other position/activity restrictions: ABductor pillow while in bed and while turning while in hospital, then use regular pillow afterwards; S/p mechanical fall with hip fracture, s/p L hip hemiarthroplasty  Subjective   General  Chart Reviewed: Yes  Patient assessed for rehabilitation services?: Yes  Additional Pertinent Hx: hearing loss, glaucoma, spinal stenosis, neuropathy, a-fib  Response to previous treatment: Patient with no complaints from previous session  Family / Caregiver Present: No  Diagnosis: s/p L hip hemiarthroplasty  Subjective  Subjective: Pt supine in bed upon arrival. Pleasant and agreeable to therapy session. Denies pain at this time-requesting to shower.    General Comment  Comments: Pt goes by \"[place]\"  Vital Signs  Patient Currently in Pain: Denies   Orientation  Orientation  Overall Orientation Status: Within Functional Limits  Objective    ADL  Grooming: Setup;Stand by assistance(in stance at sink to brush teeth, pt combed hair at supervision seated in chair )  UE Bathing: Setup;Stand by assistance  LE Bathing: Setup;Stand by assistance(with use of long handled sponge to wash B LEs, in stance at SBA pt washed posterior ximena area)  UE Dressing: Setup;Stand by assistance  LE Dressing: Setup; Moderate assistance(Pt threaded B LEs into depends requiring mod a to complete-pt donned B socks with use of sock aid-min a to adjust socks, pt performed LB clothing management over hips in stance at mod a)  Toileting: Moderate assistance(pt requiring mod a to perform LB clothing management x2 following toileting-performed ximena care seated on toilet)  Additional Comments: UB/LB bathing and dressing tasks completed at the above assist levels during shower session. Balance  Sitting Balance: Supervision  Standing Balance: Contact guard assistance(SBA initially progressing to CGA with fatigue and to perform LB clothing management )  Standing Balance  Time: ~10 minutes  Activity: functional mobility to/from restroom, in stance to perform LB clothing management x2 following toileting, LB bathing in stance  Comment: with use of rw, SBA   Functional Mobility  Functional - Mobility Device: Rolling Walker  Activity: To/from bathroom  Assist Level: Stand by assistance  Functional Mobility Comments: with use of rw  Toilet Transfers  Toilet - Technique: Ambulating  Equipment Used: Standard bedside commode(placed over toilet)  Toilet Transfer: Contact guard assistance  Toilet Transfers Comments: cues for hand placment  Shower Transfers  Shower - Transfer From: Walker  Shower - Transfer Type:  To and From  Shower - Transfer To: (shower bench)  Shower - Technique: Ambulating  Shower Transfers: Contact Guard  Shower Transfers Comments: cues for sequence-min a for stand to sit transfer due to decreased eccentric control onto shower bench   Bed mobility  Supine to Sit: Stand by assistance  Scooting: Stand by assistance  Comment: HOB elevated, increased time to complete  Transfers  Sit to stand: Contact guard assistance  Stand to sit: Contact guard assistance  Transfer Comments: cues for hand placement         Cognition  Overall Cognitive Status: WFL     Perception  Overall Perceptual Status: [facility]        Plan   Plan  Times per week: 7x  Times per day: Daily  Specific instructions for Next Treatment: no longer co-treat  Current Treatment Recommendations: Balance Training, Endurance Training, Functional Mobility Training, Equipment Evaluation, Education, & procurement, Self-Care / ADL, Patient/Caregiver Education & Training, Safety Education & Training    AM-PAC Score        AM-PAC Inpatient Daily Activity Raw Score: 15 (12/30/19 1308)  AM-PAC Inpatient ADL T-Scale Score : 34.69 (12/30/19 1308)  ADL Inpatient CMS 0-100% Score: 56.46 (12/30/19 1308)  ADL Inpatient CMS G-Code Modifier : CK (12/30/19 1308)    Goals  Short term goals  Time Frame for Short term goals: Discharge  Short term goal 1: SBA for bed mobility. - goal met 12/30  Short term goal 2: Min A for functional transfers.- SBA-CGA 12/30  Short term goal 3: Min A for functional mobility. - min A for tight turn, otherwise, CGA, GOAL MET 12/28  Short term goal 4: Mod A for LB ADLs with AE.-  mod a 12/30  Short term goal 5: SBA for all UB ADLs. - goal met 12/30  Long term goals  Time Frame for Long term goals : STG=LTG  Long term goal 1: New goal: CGA for functional mobility. -SBA progressing to CGA due to fatigue-continue goal 12/30  Patient Goals   Patient goals :  \"To consider senior living apartment with  after rehab\"       Therapy Time   Individual Concurrent Group Co-treatment   Time In 1108         Time Out 1202         Minutes 54            Timed Code Treatment Minutes:  54 minutes  Total Treatment Minutes:  54 minutes    [address]

## 2019-12-30 NOTE — PROGRESS NOTES
[facility] PROGRESS NOTE    12/30/2019 2:56 PM        Name: Zenobia Blackburn . Admitted: 12/23/2019  Primary Care Provider: Horace Locke MD (Tel: [phone])      Subjective:    Doing well. No complaints other than the fact that her dentures were not fitting properly and she has been having trouble chewing of food. No chest pain or shortness of breath. Pain is currently controlled.       Reviewed interval ancillary notes    Current Medications  bisacodyl (DULCOLAX) suppository 10 mg, Daily PRN  tamsulosin (FLOMAX) capsule 0.4 mg, Daily  bethanechol (URECHOLINE) tablet 10 mg, TID  sodium chloride flush 0.9 % injection 10 mL, 2 times per day  sodium chloride flush 0.9 % injection 10 mL, PRN  docusate sodium (COLACE) capsule 100 mg, BID  sennosides-docusate sodium (SENOKOT-S) 8.6-50 MG tablet 1 tablet, BID  magnesium hydroxide (MILK OF MAGNESIA) 400 MG/5ML suspension 30 mL, Daily PRN  ondansetron (ZOFRAN) injection 4 mg, Q6H PRN  enoxaparin (LOVENOX) injection 40 mg, Daily  aspirin chewable tablet 81 mg, Daily  levothyroxine (SYNTHROID) tablet 50 mcg, QAM AC  LORazepam (ATIVAN) tablet 0.5 mg, Q8H PRN  polyethylene glycol (GLYCOLAX) packet 17 g, Daily PRN  acetaminophen (TYLENOL) tablet 650 mg, Q4H PRN  HYDROcodone-acetaminophen (NORCO) 5-325 MG per tablet 1 tablet, Q4H PRN    Or  HYDROcodone-acetaminophen (NORCO) 5-325 MG per tablet 2 tablet, Q4H PRN  HYDROmorphone HCl PF (DILAUDID) injection 0.25 mg, Q3H PRN    Or  HYDROmorphone HCl PF (DILAUDID) injection 0.5 mg, Q3H PRN  metoprolol tartrate (LOPRESSOR) tablet 25 mg, BID        Objective:  /60   Pulse 80   Temp 97.8 °F (36.6 °C) (Oral)   Resp 16   Ht 5' 6\" (1.676 m)   Wt 172 lb 6.4 oz (78.2 kg)   SpO2 96%   BMI 27.83 kg/m²     Intake/Output Summary (Last 24 hours) at 12/30/2019 1456  Last data filed at 12/29/2019 2138  Gross per 24 hour tsh in range at 2.62   7.  Chronic anxiety: on home dose of ativan       Diet: Dietary Nutrition Supplements: Standard High Calorie Oral Supplement  DIET GENERAL; Dental Soft  Code:Full Code  DVT PPX Tabatha Zeng PA-C   12/30/2019 2:56 PM

## 2019-12-30 NOTE — PROGRESS NOTES
Nutrition Assessment    Type and Reason for Visit: Initial(RD identified LOS)    Nutrition Recommendations:   1. Continue current diet  2. Continue supplement  3. Replete phos, phos is currently low    Nutrition Assessment: Pt is nutritionally stable at this time. Pt eating well s/p left hemiarthroplasty. Pt eating 51-75% at meals. Pt is consuming % of supplement. No needs at this time. Pt will remain nutritionally stable as long as po remains greater than 75% at meals.      Malnutrition Assessment:  · Malnutrition Status: No malnutrition    Nutrition Risk Level: Low    Nutrition Diagnosis:   · Problem: No nutrition diagnosis at this time    Objective Information:  · Nutrition-Focused Physical Findings: RLE trace, LLE +1  · Wound Type: None  · Current Nutrition Therapies:  · Oral Diet Orders: General   · Oral Diet intake: 51-75%  · Oral Nutrition Supplement (ONS) Orders: Standard High Calorie Oral Supplement  · ONS intake: %  · Anthropometric Measures:  · Ht: 5' 6\" (167.6 cm)   · Current Body Wt: 172 lb (78 kg)  · BMI Classification: BMI 25.0 - 29.9 Overweight    Nutrition Interventions:   Continue current diet, Continue current ONS  Continued Inpatient Monitoring, Education Not Indicated    Nutrition Evaluation:   · Evaluation: Goals set   · Goals: PO 75% or more at meals/supp    · Monitoring: Meal Intake, Supplement Intake, Weight      Electronically signed by Etta Pathak RD, CNSC, LD on 12/30/19 at 11:13 AM    Contact Number: [phone]

## 2019-12-30 NOTE — PROGRESS NOTES
[facility] Orthopedic Surgery   Progress Note    CHIEF COMPLAINT/DIAGNOSIS:  12/24/19 LEFT hip hemiarthroplasty    SUBJECTIVE: Patient sitting up in bed; describes no significant hip pain    OBJECTIVE  Physical    VITALS:  /64   Pulse 84   Temp 98.2 °F (36.8 °C) (Oral)   Resp 16   Ht 5' 6\" (1.676 m)   Wt 172 lb 6.4 oz (78.2 kg)   SpO2 94%   BMI 27.83 kg/m²     GENERAL: Alert, NAD   MUSCULOSKELETAL: LEFT LE  INCISION:  Dressing c/d/i  ROM: intact DF/PF  Sensory:  Intact to light touch in peroneal and tibial distributions  Vascular:   Intact post tib pulse;  calf soft and nontender    Data    ALL MEDICATIONS HAVE BEEN REVIEWED    CBC: No results for input(s): WBC, HGB, HCT, PLT in the last 72 hours. BMP: No results for input(s): NA, K, CL, CO2, PHOS, BUN, CREATININE in the last 72 hours. Invalid input(s): CA  INR: No results for input(s): INR in the last 72 hours. ASSESSMENT:  12/24/19 LEFT hip hemiarthroplasty, POD#6  Atrial fibrillation  HTN  Peripheral neuropathy  Spinal stenosis    PLAN:   - WB status:  WBAT, Posterior hip precautions reviewed post op precautions  - DVT prophylaxis: Lovenox; transition to Eliquis 2.5 mg BID x 30 days as OP  - PT/OT  - D/C Plan: Awaiting SNF precert  - Pain Control: current regimen. Due to orthopaedic surgical procedure/condition, patient may require pain medication for up to 6-8 weeks. - F U with Dr. Yesenia Gómez in 2 weeks; call [phone] for appt.     LEILANI SOLOMON-CNP  12/30/2019  10:56 AM    .

## 2019-12-31 VITALS
SYSTOLIC BLOOD PRESSURE: 147 MMHG | HEART RATE: 75 BPM | WEIGHT: 172.4 LBS | BODY MASS INDEX: 27.71 KG/M2 | RESPIRATION RATE: 16 BRPM | TEMPERATURE: 98.5 F | HEIGHT: 66 IN | OXYGEN SATURATION: 97 % | DIASTOLIC BLOOD PRESSURE: 67 MMHG

## 2019-12-31 PROCEDURE — 97530 THERAPEUTIC ACTIVITIES: CPT

## 2019-12-31 PROCEDURE — 97116 GAIT TRAINING THERAPY: CPT

## 2019-12-31 PROCEDURE — 6370000000 HC RX 637 (ALT 250 FOR IP): Performed by: ORTHOPAEDIC SURGERY

## 2019-12-31 PROCEDURE — APPNB30 APP NON BILLABLE TIME 0-30 MINS: Performed by: NURSE PRACTITIONER

## 2019-12-31 PROCEDURE — 97535 SELF CARE MNGMENT TRAINING: CPT

## 2019-12-31 PROCEDURE — 99024 POSTOP FOLLOW-UP VISIT: CPT | Performed by: NURSE PRACTITIONER

## 2019-12-31 PROCEDURE — 6370000000 HC RX 637 (ALT 250 FOR IP): Performed by: PHYSICIAN ASSISTANT

## 2019-12-31 PROCEDURE — 6370000000 HC RX 637 (ALT 250 FOR IP): Performed by: NURSE PRACTITIONER

## 2019-12-31 PROCEDURE — 6360000002 HC RX W HCPCS: Performed by: ORTHOPAEDIC SURGERY

## 2019-12-31 RX ADMIN — ENOXAPARIN SODIUM 40 MG: 40 INJECTION SUBCUTANEOUS at 10:12

## 2019-12-31 RX ADMIN — BETHANECHOL CHLORIDE 10 MG: 10 TABLET ORAL at 18:44

## 2019-12-31 RX ADMIN — BETHANECHOL CHLORIDE 10 MG: 10 TABLET ORAL at 07:01

## 2019-12-31 RX ADMIN — HYDROCODONE BITARTRATE AND ACETAMINOPHEN 1 TABLET: 5; 325 TABLET ORAL at 01:14

## 2019-12-31 RX ADMIN — SENNOSIDES AND DOCUSATE SODIUM 1 TABLET: 8.6; 5 TABLET ORAL at 10:12

## 2019-12-31 RX ADMIN — LEVOTHYROXINE SODIUM 50 MCG: 0.03 TABLET ORAL at 07:02

## 2019-12-31 RX ADMIN — ASPIRIN 81 MG 81 MG: 81 TABLET ORAL at 10:13

## 2019-12-31 RX ADMIN — DOCUSATE SODIUM 100 MG: 100 CAPSULE ORAL at 10:13

## 2019-12-31 RX ADMIN — METOPROLOL TARTRATE 25 MG: 25 TABLET, FILM COATED ORAL at 10:13

## 2019-12-31 RX ADMIN — METOPROLOL TARTRATE 25 MG: 25 TABLET, FILM COATED ORAL at 20:38

## 2019-12-31 RX ADMIN — HYDROCODONE BITARTRATE AND ACETAMINOPHEN 1 TABLET: 5; 325 TABLET ORAL at 18:46

## 2019-12-31 RX ADMIN — APIXABAN 2.5 MG: 2.5 TABLET, FILM COATED ORAL at 18:44

## 2019-12-31 RX ADMIN — LORAZEPAM 0.5 MG: 0.5 TABLET ORAL at 18:49

## 2019-12-31 ASSESSMENT — PAIN SCALES - GENERAL
PAINLEVEL_OUTOF10: 7
PAINLEVEL_OUTOF10: 6

## 2019-12-31 NOTE — PROGRESS NOTES
[place] Orth[facility]   Progress Note    CHIEF COMPLAINT/DIAGNOSIS:  12/24/19 LEFT hip hemiarthroplasty    SUBJECTIVE: Patient sitting up in chair with  at side. Denies hip pain. States she is waiting for insurance to approve SNF. OBJECTIVE  Physical    VITALS:  /71   Pulse 93   Temp 98.1 °F (36.7 °C) (Oral)   Resp 18   Ht 5' 6\" (1.676 m)   Wt 172 lb 6.4 oz (78.2 kg)   SpO2 97%   BMI 27.83 kg/m²     GENERAL: Alert, NAD   MUSCULOSKELETAL: LEFT LE  INCISION:  Dressing c/d/i  ROM: intact DF/PF  Sensory:  Intact to light touch in peroneal and tibial distributions  Vascular:   Intact post tib pulse;  calf soft and nontender    Data    ALL MEDICATIONS HAVE BEEN REVIEWED    CBC:   Recent Labs     12/30/19  1436   WBC 4.0   HGB 10.1*   HCT 29.7*        BMP:   Recent Labs     12/30/19  1436   *   K 4.5   CL 98*   CO2 24   BUN 18   CREATININE 1.1     INR: No results for input(s): INR in the last 72 hours. ASSESSMENT:  12/24/19 LEFT hip hemiarthroplasty, POD#7  Atrial fibrillation  HTN  Peripheral neuropathy  Spinal stenosis    PLAN:   - WB status:  WBAT, Posterior hip precautions  - DVT prophylaxis: Lovenox; transition to Eliquis 2.5 mg BID x 30 days as OP  - PT/OT  - D/C Plan: Awaiting SNF precert  - Pain Control: current regimen. Due to orthopaedic surgical procedure/condition, patient may require pain medication for up to 6-8 weeks. - F U with Dr. Lakesha Gibbs in 2 weeks; call [phone] for appt.     LEILANI SOLOMON-CNP  12/31/2019  12:51 PM    .

## 2019-12-31 NOTE — PROGRESS NOTES
Physical Therapy  Facility/Department: [address] ORTHO/NEURO NURSING  Daily Treatment Note  NAME: Taylor Sanz  : 1928  MRN: [id number]    Date of Service: 2019    Discharge Recommendations:    Taylor Sanz scored a 17/24 on the AM-PAC short mobility form. Current research shows that an AM-PAC score of 17 or less is typically not associated with a discharge to the patient's home setting. Based on the patients AM-PAC score and their current functional mobility deficits, it is recommended that the patient have 3-5 sessions per week of Physical Therapy at d/c to increase the patients independence. PT Equipment Recommendations  Equipment Needed: No  Other: TBD at next level of care    Assessment   Body structures, Functions, Activity limitations: Decreased functional mobility ; Decreased balance;Decreased ADL status; Decreased ROM; Decreased endurance;Decreased high-level IADLs; Increased pain;Decreased strength;Decreased sensation  Assessment: Pt p/op L JEANNE,  she is anxious regarding her situation as she is sole caregiver of her  w/dementia. Pt performed well with PT this date Min A for transfers and SBA with RW for ambulation. Pt will benefit from continued PT to address impairments and return to PLOF:  Treatment Diagnosis: decreased  functional mobility for allDL/IADL, impaired gait, transfers  Prognosis: Good  Decision Making: Medium Complexity  History: Pt with Afib  Clinical Presentation: stable,   PT Education: Goals;PT Role;General Safety;Gait Training;Precautions; Functional Mobility Training;Transfer Training;Home Exercise Program  REQUIRES PT FOLLOW UP: Yes  Activity Tolerance  Activity Tolerance: Patient Tolerated treatment well  Activity Tolerance: Pt slightly more fatigued this afternoon from this morning. Ambulated 140' with RW SBA/CGA. Pt will benefit from continued PT.       Patient Diagnosis(es): The primary encounter diagnosis was Closed fracture of left hip,

## 2019-12-31 NOTE — PROGRESS NOTES
Occupational Therapy  Facility/Department: [address] ORTHO/NEURO NURSING  Daily Treatment Note  NAME: Bharat Brown  : 1928  MRN: [id number]    Date of Service: 2019    Discharge Recommendations:      Bharat Brown scored a 17/24 on the AM-PAC ADL Inpatient form. Current research shows that an AM-PAC score of 17 or less is typically not associated with a discharge to the patient's home setting. Based on the patients AM-PAC score and their current ADL deficits, it is recommended that the patient have 3-5 sessions per week of Occupational Therapy at d/c to increase the patients independence. OT Equipment Recommendations  Equipment Needed: No  Other: defer to next setting - would need LH shoe horn, sponge, elastic laces, reacher, sock aid    Assessment   Performance deficits / Impairments: Decreased functional mobility ; Decreased ADL status; Decreased endurance;Decreased balance;Decreased high-level IADLs  Assessment: Pt is not at baseline level of function and will benefit from skilled OT in order to address the above limitations. Treatment Diagnosis: Decreased functional mobility, ADL/IADL status, activity tolerance, balance related to s/p L hip hemiarthroplasty  Prognosis: Good  Decision Making: Medium Complexity  OT Education: OT Role;Plan of Care;Precautions;Transfer Training;ADL Adaptive Strategies  Patient Education: d/c, OT Role, Plan of Care, Precautions, Transfer Training, A/E for LB dressing, ADL Adaptive Strategies  Barriers to Learning: hearing  REQUIRES OT FOLLOW UP: Yes  Activity Tolerance  Activity Tolerance: Patient Tolerated treatment well  Safety Devices  Safety Devices in place: Yes  Type of devices: All fall risk precautions in place;Call light within reach; Chair alarm in place;Gait belt;Patient at risk for falls; Left in chair;Nurse notified         Patient Diagnosis(es): The primary encounter diagnosis was Closed fracture of left hip, initial encounter ([facility] [address]  A Stand by assistance(in stance at sink to brush teeth, wash face)  Toileting: Stand by assistance  Additional Comments: Pt performed functional mobility to/from restroom requesting to toilet, following voiding urine pt completed ximena care seated on toilet, Pt able to complete LB clothing management SBA-Pt ambulated to sink and performed grooming tasks in stance at sink. Pt declined need for further ADLs during session. Balance  Sitting Balance: Supervision  Standing Balance: Stand by assistance  Standing Balance  Time: ~15 minutes  Activity: functional mobility to/from restroom, in stance to perform LB clothing management x2 following toileting, grooming tasks in stance at sink, functional mobility in hallway  Comment: with use of rw, SBA, no LOB  Functional Mobility  Functional - Mobility Device: Rolling Walker  Activity: To/from bathroom; Other  Assist Level: Stand by assistance  Functional Mobility Comments: with use of rw ~50 ft ambulated in hallway, no LOB, tactile cueing to promote thoracic extension once in stance  Toilet Transfers  Toilet - Technique: Ambulating  Equipment Used: Standard bedside commode(placed over toilet)  Toilet Transfer: Stand by assistance  Toilet Transfers Comments: pt demonstrated good hand placment   Bed mobility  Supine to Sit: Stand by assistance  Scooting: Stand by assistance  Transfers  Sit to stand: Stand by assistance  Stand to sit: Stand by assistance     Cognition  Overall Cognitive Status: WFL     Perception  Overall Perceptual Status: [facility]        Plan   Plan  Times per week: 7x  Times per day: Daily  Specific instructions for Next Treatment: no longer co-treat  Current Treatment Recommendations: Balance Training, Endurance Training, Functional Mobility Training, Equipment Evaluation, Education, & procurement, Self-Care / ADL, Patient/Caregiver Education & Training, Safety Education & Training    AM-PAC Score        AM-PAC Inpatient Daily Activity Raw Score: 17 (12/31/19 1220)  AM-PAC Inpatient ADL T-Scale Score : 37.26 (12/31/19 1220)  ADL Inpatient CMS 0-100% Score: 50.11 (12/31/19 1220)  ADL Inpatient CMS G-Code Modifier : CK (12/31/19 1220)    Goals  Short term goals  Time Frame for Short term goals: Discharge  Short term goal 1: SBA for bed mobility. - goal met 12/31  Short term goal 2: Min A for functional transfers.- SBA 12/31 goal met 12/31  Short term goal 3: Min A for functional mobility.- SBA with use of rw , 12/31 goal met   Short term goal 4: Mod A for LB ADLs with AE.-  mod a 12/30, SBA 12/31  Short term goal 5: SBA for all UB ADLs. - goal met 12/30, not addressed 12/31  Long term goals  Time Frame for Long term goals : STG=LTG  Long term goal 1: New goal: CGA for functional mobility. -SBA with use of rw, goal met 12/31  Patient Goals   Patient goals :  \"To consider senior living apartment with  after rehab\"       Therapy Time   Individual Concurrent Group Co-treatment   Time In 1043         Time Out 1121         Minutes 38            Timed Code Treatment Minutes:  38 minutes  Total Treatment Minutes:  38 minutes    [address]

## 2019-12-31 NOTE — DISCHARGE SUMMARY
[facility]    Patient Demographics    Patient. Elfredia Kanner  Date of Birth. 4/17/1928  MRN. [id number]     Primary care provider. Peace Galindo MD  (Tel: [phone])    Admit date: 12/23/2019    Discharge date (blank if same as Note Date): Note Date: 12/31/2019     Reason for Hospitalization. Chief Complaint   Patient presents with    Fall     Pt from home, via  EMS, states she fell and now has left hip pain. Significant Findings. Active Problems:    Closed fracture of left hip [facility])  Atrial fibrillation  Hyponatremia    Problems and results from this hospitalization that need follow up. 1. Postop follow-up  2. Atrial fibrillation  3. Hyponatremia-repeat BMP in 1 week    Significant test results and incidental findings. 1. CT left hip  Fracture of the left femoral head/neck junction.  Superior displacement of   the distal fracture fragment by approximately 2 cm. Invasive procedures and treatments. 1. Left hip hemiarthroplasty by Dr. Naima Choudhary on December 24    [address] [facility] Course. Patient is very pleasant 27-year-old female who presented with left hip pain after losing her balance and falling. In the emergency room he was found to have a left hip fracture. She has a history of paroxysmal atrial fibrillation and was recently taken off anticoagulation as well as Cardizem after wearing a Holter monitor. She was admitted and seen by cardiology as well as orthopedic surgery preoperatively. She underwent a left hip hemiarthroplasty on December 24 by Dr. Naima Choudhary. She tolerated the procedure well. She was seen by therapy recommended SNF. Her hospitalization was prolonged by very lengthy insurance approval process.   In regards to anticoagulation for atrial fibrillation as well as DVT prophylaxis, cardiology has recommended Eliquis 2.5 mg twice daily for

## 2019-12-31 NOTE — CARE COORDINATION
DISCHARGE SUMMARY     DATE OF DISCHARGE: 12/31/19    DISCHARGE DESTINATION: [place]    FACILITY    Level of Care: Skilled    Report Number: [phone]    Fax Number:  [phone]    Precert Obtained: yes    Hens Completed: yes    PASARR: N/A    Notified: RN, Family and Facility/Agency-pt and facility aware of transport time and d/c plan.     Prescriptions Faxed:yes    TRANSPORTATION: [place] [address] Name: [address] [place] [address] up Time: 730PM    Phone Number: [phone]    Electronically signed by HU Barnett on 12/31/2019 at 1:50 PM

## 2020-01-01 NOTE — PROGRESS NOTES
Gave PM meds, assessment complete, VSS, pt belongings packed up ready for transport, assisted to the BR, tolerated well.   Clementina Zaragoza

## 2020-01-03 ENCOUNTER — TELEPHONE (OUTPATIENT)
Dept: FAMILY MEDICINE CLINIC | Age: 85
End: 2020-01-03

## 2020-01-03 NOTE — TELEPHONE ENCOUNTER
[facility] Transitions Initial Follow Up Call    Outreach made within 2 business days of discharge: Yes    Patient: Gena Long Patient : 1928   MRN: [id number]  Reason for Admission: There are no discharge diagnoses documented for the most recent discharge. Discharge Date: 19       Spoke with: Step son, not on hipaa. Informed him to have patient call back when home. Patient is currently in a rehab facility. Discharge department/facility: [facility]    TCM Interactive Patient Contact:  Was patient able to fill all prescriptions: Yes  Was patient instructed to bring all medications to the follow-up visit: Yes  Is patient taking all medications as directed in the discharge summary?  Yes  Does patient understand their discharge instructions: Yes  Does patient have questions or concerns that need addressed prior to 7-14 day follow up office visit: no    Scheduled appointment with PCP within 7-14 days    Follow Up  Future Appointments   Date Time Provider Calderon Sargent   2020 11:00 AM Royer Shepherd MD [facility]   2020 12:30 PM MD Zainab Domínguez [facility] [address] [facility]

## 2020-01-09 ENCOUNTER — TELEPHONE (OUTPATIENT)
Dept: ORTHOPEDIC SURGERY | Age: 85
End: 2020-01-09

## 2020-01-09 NOTE — TELEPHONE ENCOUNTER
Called Narcisa Joseph with [facility]. LM explaining that patient will have posterior hip precautions. The therapist will determine her therapy using the posterior hip precautions. She had surgery 12/24/19. She has not been seen in our office postoperatively yet. She is to follow up 2 weeks post op. I have no office notes at this time to provide.

## 2020-01-14 ENCOUNTER — TELEPHONE (OUTPATIENT)
Dept: ORTHOPEDIC SURGERY | Age: 85
End: 2020-01-14

## 2020-01-14 ENCOUNTER — OFFICE VISIT (OUTPATIENT)
Dept: ORTHOPEDIC SURGERY | Age: 85
End: 2020-01-14

## 2020-01-14 VITALS — WEIGHT: 172.4 LBS | HEIGHT: 66 IN | RESPIRATION RATE: 16 BRPM | BODY MASS INDEX: 27.71 KG/M2

## 2020-01-14 PROCEDURE — 99024 POSTOP FOLLOW-UP VISIT: CPT | Performed by: ORTHOPAEDIC SURGERY

## 2020-01-23 ENCOUNTER — TELEPHONE (OUTPATIENT)
Dept: ORTHOPEDIC SURGERY | Age: 85
End: 2020-01-23

## 2020-01-23 NOTE — TELEPHONE ENCOUNTER
Nilda Muller with Dr Ketty Conner office. Informed her per Dr Starr Less she does not require any prophylactic antibiotics for dental procedures. She requested letter be faxed as well. Chhaya Melvin [phone] letter completed and faxed.

## 2020-01-23 NOTE — TELEPHONE ENCOUNTER
Dr. Zorita Duane  / [phone]  Caller: Eva Melissa     Requesting: Release for dental treatment & Pre Meds    Patient Dental appointment: Feb. 10, 2020    Please fax:  [phone]

## 2020-01-29 ENCOUNTER — TELEPHONE (OUTPATIENT)
Dept: ORTHOPEDIC SURGERY | Age: 85
End: 2020-01-29

## 2020-01-29 NOTE — TELEPHONE ENCOUNTER
Saira from [address] [address] called deena an order for home health aide for this pt. Please advise.       Call  [phone] to discuss

## 2020-01-29 NOTE — TELEPHONE ENCOUNTER
I have tried 3x calling number provided in previous message. Recording states its not in service or disconnected.

## 2020-01-30 NOTE — TELEPHONE ENCOUNTER
Due to not being able to contact this [facility] facility, I put in an order for Home Health to [facility] the [facility] [address]. Order with OV note and demographic info.

## 2020-02-10 ENCOUNTER — TELEPHONE (OUTPATIENT)
Dept: ORTHOPEDIC SURGERY | Age: 85
End: 2020-02-10

## 2020-02-11 NOTE — TELEPHONE ENCOUNTER
Called Viky garvin/[facility]. with the new number provided. LM that I put home health order into [facility] on 1/30/20. Faxed patient's info to them as well. Asked that she reach out to see if they could set up with the patient.

## 2020-02-25 ENCOUNTER — OFFICE VISIT (OUTPATIENT)
Dept: ORTHOPEDIC SURGERY | Age: 85
End: 2020-02-25

## 2020-02-25 VITALS — WEIGHT: 172.4 LBS | BODY MASS INDEX: 27.71 KG/M2 | HEIGHT: 66 IN

## 2020-02-25 PROCEDURE — 99024 POSTOP FOLLOW-UP VISIT: CPT | Performed by: ORTHOPAEDIC SURGERY

## 2020-02-25 NOTE — LETTER
[facility]  [address] [address]  Phone: [phone]  Fax: [phone]    Rema Cooks, MD        February 25, 2020     Patient: Rubina Verduzco   YOB: 1928   Date of Visit: 2/25/2020       To Whom It May Concern:     Rubina Verduzco She broke down/crying because she was overwhelmed with everything. She did not understand hip precautions/restrictions. She is worried that she has to use walker. She move into senior living facility. She has to help take care of  who has dementia.     Continue strengthening exercises.       Strict Posterior hip precautions for 1 more month. Handout provided as she does not seem to understand or even know.       Follow up in 2 months. At that time, we will take AP pelvis and 2 views of the affected hip. If you have any questions or concerns, please don't hesitate to call.     Sincerely,        Rema Cooks, MD

## 2020-03-24 RX ORDER — LEVOTHYROXINE SODIUM 0.05 MG/1
50 TABLET ORAL DAILY
Qty: 90 TABLET | Refills: 3 | Status: SHIPPED | OUTPATIENT
Start: 2020-03-24 | End: 2021-03-29 | Stop reason: SDUPTHER

## 2020-04-02 ENCOUNTER — TELEPHONE (OUTPATIENT)
Dept: CARDIOLOGY CLINIC | Age: 85
End: 2020-04-02

## 2020-04-06 ENCOUNTER — TELEPHONE (OUTPATIENT)
Dept: DERMATOLOGY | Age: 85
End: 2020-04-06

## 2020-04-24 ENCOUNTER — TELEPHONE (OUTPATIENT)
Dept: ORTHOPEDIC SURGERY | Age: 85
End: 2020-04-24

## 2020-04-24 NOTE — TELEPHONE ENCOUNTER
Fax # [phone] called and I asked her which one was not received since we have faxed over so many in the past week. She told me which one told her this had been faxed. Re faxed to her directly at the # above.   She will call me if any issues

## 2020-05-12 ENCOUNTER — OFFICE VISIT (OUTPATIENT)
Dept: ORTHOPEDIC SURGERY | Age: 85
End: 2020-05-12
Payer: MEDICARE

## 2020-05-12 VITALS — HEIGHT: 66 IN | TEMPERATURE: 98.7 F | BODY MASS INDEX: 27.64 KG/M2 | WEIGHT: 172 LBS

## 2020-05-12 PROCEDURE — G8417 CALC BMI ABV UP PARAM F/U: HCPCS | Performed by: ORTHOPAEDIC SURGERY

## 2020-05-12 PROCEDURE — 1090F PRES/ABSN URINE INCON ASSESS: CPT | Performed by: ORTHOPAEDIC SURGERY

## 2020-05-12 PROCEDURE — 1123F ACP DISCUSS/DSCN MKR DOCD: CPT | Performed by: ORTHOPAEDIC SURGERY

## 2020-05-12 PROCEDURE — 4040F PNEUMOC VAC/ADMIN/RCVD: CPT | Performed by: ORTHOPAEDIC SURGERY

## 2020-05-12 PROCEDURE — 1036F TOBACCO NON-USER: CPT | Performed by: ORTHOPAEDIC SURGERY

## 2020-05-12 PROCEDURE — G8427 DOCREV CUR MEDS BY ELIG CLIN: HCPCS | Performed by: ORTHOPAEDIC SURGERY

## 2020-05-12 PROCEDURE — 99213 OFFICE O/P EST LOW 20 MIN: CPT | Performed by: ORTHOPAEDIC SURGERY

## 2020-05-12 NOTE — LETTER
[facility]  [address]  [address] [address]  Phone: [phone]  Fax: [phone]    Deloris Knott MD        May 12, 2020     Patient: Juan Daigle   YOB: 1928   Date of Visit: 5/12/2020       To Whom It May Concern: It is my medical opinion that Juan Daigle requires a disability parking placard for the following reasons:  She has limited walking ability due to an orthopedic condition. Duration of need: 1 year    If you have any questions or concerns, please don't hesitate to call. Sincerely,    Seth Luis.  Drew Barros MD  Orthopaedic Surgery and Sports Medicine

## 2020-07-08 ENCOUNTER — OFFICE VISIT (OUTPATIENT)
Dept: CARDIOLOGY CLINIC | Age: 85
End: 2020-07-08
Payer: MEDICARE

## 2020-07-08 VITALS
DIASTOLIC BLOOD PRESSURE: 80 MMHG | BODY MASS INDEX: 22.15 KG/M2 | WEIGHT: 141.12 LBS | HEART RATE: 70 BPM | SYSTOLIC BLOOD PRESSURE: 152 MMHG | HEIGHT: 67 IN

## 2020-07-08 PROCEDURE — 4040F PNEUMOC VAC/ADMIN/RCVD: CPT | Performed by: INTERNAL MEDICINE

## 2020-07-08 PROCEDURE — G8427 DOCREV CUR MEDS BY ELIG CLIN: HCPCS | Performed by: INTERNAL MEDICINE

## 2020-07-08 PROCEDURE — 1123F ACP DISCUSS/DSCN MKR DOCD: CPT | Performed by: INTERNAL MEDICINE

## 2020-07-08 PROCEDURE — G8420 CALC BMI NORM PARAMETERS: HCPCS | Performed by: INTERNAL MEDICINE

## 2020-07-08 PROCEDURE — 99214 OFFICE O/P EST MOD 30 MIN: CPT | Performed by: INTERNAL MEDICINE

## 2020-07-08 PROCEDURE — 93000 ELECTROCARDIOGRAM COMPLETE: CPT | Performed by: INTERNAL MEDICINE

## 2020-07-08 PROCEDURE — 1090F PRES/ABSN URINE INCON ASSESS: CPT | Performed by: INTERNAL MEDICINE

## 2020-07-08 PROCEDURE — 1036F TOBACCO NON-USER: CPT | Performed by: INTERNAL MEDICINE

## 2020-07-08 RX ORDER — LORAZEPAM 0.5 MG/1
0.5 TABLET ORAL EVERY 8 HOURS PRN
COMMUNITY
End: 2020-07-15 | Stop reason: SDUPTHER

## 2020-07-08 RX ORDER — ASPIRIN 81 MG/1
81 TABLET ORAL DAILY
COMMUNITY
End: 2021-04-09 | Stop reason: ALTCHOICE

## 2020-07-08 NOTE — PROGRESS NOTES
[facility]   Cardiac Follow-up    Referring Provider:  Kendell Edwards MD     Chief Complaint   Patient presents with   [facility]     c/o unstable BP, aching in chest, and fatigue    Hypertension      History of Present Illness:     Mrs. Annmarie Elliott is a 80 y.o. female here for irregular heart beat; she was a former patient of Dr. Elly Macdonald. She has a history of hypertension and anxiety. She was originally diagnosed with Atrial fib in November of 2011 but converted spontaneously without treatment. She presented to the ER in October 2012 with palpitations; ECG at that time showed sinus arrhythmia. She then wore a Holter monitor in 10/2012 which showed short burst of PAF and was started on Xarelto, but no longer on it because of h/o anemia. Event recorder 2014 showed PAC's and brief PAT. Holter thru [address] in 2015 showed AF, amio was recommended. She takes Ativan as needed for her anxiety. Today, she states that she continues to be fatigued, and nervous, she has been concerned about her BP, and wonders if she is on the right medications. She has not been taking metoprolol, she does not believe she has taken this since her discharge in December. She admits to being overly anxious and continues on Ativan for this as needed (~ few times a week). She states she has been very stressed, and anxious lately. She care for her  with advanced dementia. Past Medical History:   has a past medical history of Anxiety, Atrial fibrillation ([address] Disorder of bone and cartilage, unspecified, Fatigue, Hypertension, Migraine, unspecified, without mention of intractable migraine without mention of status migrainosus, Other disorders of kidney and ureter, Peripheral neuropathy, Psychiatric problem, Risk for falls, Spinal stenosis, Thyroid disease, Unspecified glaucoma(365.9), and Unspecified hearing loss.     Surgical History:   has a past surgical history that includes Colon surgery; Kidney surgery; Ovary removal; Appendectomy; eye surgery; Cataract removal (Bilateral, 04/2017); Colonoscopy; and hip surgery (Left, 12/24/2019). Social History:   reports that she has never smoked. She has never used smokeless tobacco. She reports that she does not drink alcohol or use drugs. Family History:  family history includes Heart Disease in her father, mother, and paternal aunt; High Blood Pressure in her mother; High Cholesterol in her mother; Stroke in her father. Home Medications:  Outpatient Encounter Medications as of 7/8/2020   Medication Sig Dispense Refill    LORazepam (ATIVAN) 0.5 MG tablet Take 0.5 mg by mouth every 8 hours as needed for Anxiety.  aspirin 81 MG EC tablet Take 81 mg by mouth daily      metoprolol tartrate (LOPRESSOR) 25 MG tablet Take 1 tablet by mouth 2 times daily 180 tablet 1    levothyroxine (SYNTHROID) 50 MCG tablet Take 1 tablet by mouth daily 90 tablet 3    vitamin B-12 (CYANOCOBALAMIN) 1000 MCG tablet Take 2,000 mcg by mouth daily      Multiple Vitamins-Minerals (THERAPEUTIC MULTIVITAMIN-MINERALS) tablet Take 1 tablet by mouth every other day      tamsulosin (FLOMAX) 0.4 MG capsule Take 1 capsule by mouth daily (Patient not taking: Reported on 7/8/2020) 30 capsule 3    [DISCONTINUED] metoprolol tartrate (LOPRESSOR) 25 MG tablet Take 1 tablet by mouth 2 times daily (Patient not taking: Reported on 7/8/2020) 60 tablet 3     No facility-administered encounter medications on file as of 7/8/2020. Allergies:  Mirtazapine; Zoloft [sertraline hcl]; and Sertraline     Review of Systems:   · Constitutional: there has been no unanticipated weight loss. There's been no change in energy level, sleep pattern, or activity level. Ongoing fatigue  · Eyes: No visual changes or diplopia. No scleral icterus. · ENT: No Headaches, hearing loss or vertigo. No mouth sores or sore throat.   · Cardiovascular: Reviewed in HPI    · Respiratory: No cough or wheezing, no sputum production. No hematemesis. · Gastrointestinal: No abdominal pain, appetite loss, blood in stools. No change in bowel or bladder habits. · Genitourinary: No dysuria, trouble voiding, or hematuria. · Musculoskeletal:  No gait disturbance, weakness or joint complaints. · Integumentary: No rash or pruritis. · Neurological: No headache, diplopia, change in muscle strength, numbness or tingling. No change in gait, balance, coordination, mood, affect, memory, mentation, behavior. · Psychiatric: History of anxiety  · Endocrine: No malaise, fatigue or temperature intolerance. No excessive thirst, fluid intake, or urination. No tremor. · Hematologic/Lymphatic: No abnormal bruising or bleeding, blood clots or swollen lymph nodes. · Allergic/Immunologic: No nasal congestion or hives. Physical Examination:    Blood pressure (!) 152/80, pulse 70, height 5' 7\" (1.702 m), weight 141 lb 1.9 oz (64 kg), not currently breastfeeding. Constitutional and General Appearance: NAD   Skin:good turgor,intact without lesions  HEENT: EOMI ,normal  Neck:no JVD    Respiratory:  · Normal excursion and expansion without use of accessory muscles  · Resp Auscultation: Normal breath sounds without dullness  Cardiovascular:  · The apical impulses not displaced  · Heart tones are crisp and normal  · Cervical veins are not engorged  · The carotid upstroke is normal in amplitude and contour without delay or bruit  · Normal S1S2, No S3, AS Murmur,  Rhythm is regular, rate is controlled  · Peripheral pulses are symmetrical and full  · There is no clubbing, cyanosis of the extremities. · No edema.   · Femoral Arteries: 2+ and equal  · Pedal Pulses: 2+ and equal   Abdomen:  · No masses or tenderness  · Liver/Spleen: No Abnormalities Noted  Neurological/Psychiatric:  · Alert and oriented in all spheres  · Moves all extremities well  · Exhibits normal gait balance and coordination  · No abnormalities of mood, affect, memory, mentation, or behavior are noted    Carotid dopplers 7/7/17:  No significant stenosis nnaci. Myoview 2/2017 normal    Echo 2/2016  Summary   -Normal left ventricle size, wall thickness and systolic function with an   estimated ejection fraction of 55%.   -Mild to moderate mitral regurgitation is present.   -Trivial aortic regurgitation is present.   -There is mild tricuspid regurgitation with RVSP estimated at 42 mmHg. Holter monitor 2015 ([address]):  Baseline rhythm is atrial fibrillation, with HR ranging between 48 to  124 bpm, average HR 68 bpm.  No significant conduction defects or pauses. Rare PVCs. Patient complaints of dizziness and nausea were correlated with AF at the rates of 70s-80s. Echo  5/2013  Preserved LV systolic function with EF of 60%  Mild mitral regurgitation is present. Mild tricuspid regurgitation with RVSP estimated at 39 mmHg. Assessment:     1. Paroxysmal Atrial fibrillation: In NSR with PACs on 3/9/18. Most of her ECGs show sinus rhythm with very frequent PACs, which might be mistaken for AF. Event recorder 4/2014> PAF- burden unclear but probably minimal  Holter monitor worn 11/2015 with [address] showed AF, ahr 68.      2. Anxiety/Depression: treated with prn Ativan> takes 2-3 times a week. 3.   Hypertension- Blood pressure (!) 152/80, pulse 70, height 5' 7\" (1.702 m), weight 141 lb 1.9 oz (64 kg), not currently breastfeeding. Restart Metoprolol - she has not taken this since her hospital dc in December. Plan:  Mrs. Sharda Sheikh has a stable cardiac status. She does have issues with fatigue at times but no clear etiology. 1. Restart Metoprolol - she has not taken this since her hospital dc in December. 2. Will continue with risk factor modifications. 3. Follow up with cardiology 6  months. She should not be on anticoagulation at this time due to infrequent nature of a.  Fib and advanced age    Thank you for allowing me to participate in the care of this individual.    Patient's problem list, medications, allergies, past medical, surgical, social and family histories were reviewed and updated as appropriate. Scribe's attestation: This note was scribed in the presence of Dr. Tara Lee MD, by Vandana Jaramillo RN. The scribe's documentation has been prepared under my direction and personally reviewed by me in its entirety. I confirm that the note above accurately reflects all work, treatment, procedures, and medical decision making performed by me.

## 2020-07-13 ENCOUNTER — TELEPHONE (OUTPATIENT)
Dept: ADMINISTRATIVE | Age: 85
End: 2020-07-13

## 2020-07-13 NOTE — TELEPHONE ENCOUNTER
Patient requesting a medication refill. Medication: LORazepam (ATIVAN) 0.5 MG tablet  Pharmacy: [facility] [place] [address]   Last office visit: 4/13/2020  Next office visit: Not able to do vv would like to schedule an in office visit.

## 2020-07-14 ENCOUNTER — TELEPHONE (OUTPATIENT)
Dept: FAMILY MEDICINE CLINIC | Age: 85
End: 2020-07-14

## 2020-07-14 NOTE — TELEPHONE ENCOUNTER
[facility] received a call from:    Name of Caller: Lauren Casiano    Relationship to patient:Self    Organization name:N/A    Best contact number: [phone]    Reason for call: Pt is requesting an in office appointment. She is unable to do a virtual appointment.  Please advise

## 2020-07-15 ENCOUNTER — OFFICE VISIT (OUTPATIENT)
Dept: FAMILY MEDICINE CLINIC | Age: 85
End: 2020-07-15
Payer: MEDICARE

## 2020-07-15 VITALS
WEIGHT: 141 LBS | DIASTOLIC BLOOD PRESSURE: 80 MMHG | TEMPERATURE: 98.1 F | SYSTOLIC BLOOD PRESSURE: 124 MMHG | BODY MASS INDEX: 22.08 KG/M2

## 2020-07-15 PROCEDURE — G8420 CALC BMI NORM PARAMETERS: HCPCS | Performed by: FAMILY MEDICINE

## 2020-07-15 PROCEDURE — 4040F PNEUMOC VAC/ADMIN/RCVD: CPT | Performed by: FAMILY MEDICINE

## 2020-07-15 PROCEDURE — 1123F ACP DISCUSS/DSCN MKR DOCD: CPT | Performed by: FAMILY MEDICINE

## 2020-07-15 PROCEDURE — 99213 OFFICE O/P EST LOW 20 MIN: CPT | Performed by: FAMILY MEDICINE

## 2020-07-15 PROCEDURE — 1090F PRES/ABSN URINE INCON ASSESS: CPT | Performed by: FAMILY MEDICINE

## 2020-07-15 PROCEDURE — G8427 DOCREV CUR MEDS BY ELIG CLIN: HCPCS | Performed by: FAMILY MEDICINE

## 2020-07-15 PROCEDURE — 1036F TOBACCO NON-USER: CPT | Performed by: FAMILY MEDICINE

## 2020-07-15 RX ORDER — LORAZEPAM 0.5 MG/1
0.5 TABLET ORAL EVERY 8 HOURS PRN
Qty: 90 TABLET | Refills: 2 | Status: SHIPPED | OUTPATIENT
Start: 2020-07-15 | End: 2020-08-14

## 2020-07-15 ASSESSMENT — ENCOUNTER SYMPTOMS
BACK PAIN: 1
DIARRHEA: 0
CONSTIPATION: 1
CHEST TIGHTNESS: 0
RHINORRHEA: 0
SHORTNESS OF BREATH: 0

## 2020-07-15 NOTE — PROGRESS NOTES
SUBJECTIVE:    Gisell Mckeon is a 80 y.o. female who presents for a follow up visit. Chief Complaint   Patient presents with    Follow-up     Patient is here for a follow up. Did not have lab. Broke hip in December. Was given Metoprolol by cardiology , but has stopped this b/c it was making her feel bad and causing nausea. HPI   Atrial Fibrillation  Mrs. Lito Lopez has had a long history of irregular heartbeat. She was diagnosed with atrial fibrillation in November 2011 but converted spontaneously. Seen in the emergency room in October 2012 with palpitations and the rhythm at that time was sinus arrhythmia. She had a Holter monitor placed and it showed bursts of paroxysmal atrial fibrillation. She was started on Xarelto but this was stopped due to anemia. An event recorder in 2014 revealed PACs and brief PAT. following year Holter done through [facility] revealed atrial fibrillation and patient was started on amiodarone. Patient saw Dr. Abran Champion on the eighth of this month. In his office her blood pressure was elevated. Her pulse at that time was 70. He tried to put her back on metoprolol which she had been on in the past.  She states she does not tolerate it that it makes her feel very fatigued and nauseated. She stopped this after just a couple doses. Hypothyroidism  This is a chronic problem. Patient does have chronic constipation. Denies any recent change in hair or skin. She had a recent TSH that was normal.  On December 24 last year her TSH was 2.62. Anxiety  Patient has had a long history of anxiety. She has been tried on SSRIs in the past and did not tolerate them. Most recently she was given a prescription for BuSpar but feels it does not help. She has been on lorazepam in the past and requests a refill on this. Patient has been under increased stressors recently in December she fractured left hip. She has recovered nicely after having surgical repair.   She [place] with [place]  who has dementia. 's dementia adds and not the level of stress to her. Patient's medications, allergies, past medical,surgical, social and family histories were reviewed and updated as appropriate.      Past Medical History:   Diagnosis Date    Anxiety     Atrial fibrillation (HCC)     Disorder of bone and cartilage, unspecified     Fatigue     Hypertension     Migraine, unspecified, without mention of intractable migraine without mention of status migrainosus     Other disorders of kidney and ureter     Peripheral neuropathy 4/17/2015    Psychiatric problem     Risk for falls 5/20/14    Spinal stenosis     Thyroid disease     Unspecified glaucoma(365.9)     Unspecified hearing loss      Past Surgical History:   Procedure Laterality Date    APPENDECTOMY      CATARACT REMOVAL Bilateral 04/2017    COLON SURGERY      COLONOSCOPY      EYE SURGERY      HIP SURGERY Left 12/24/2019    LEFT HIP HEMIARTHROPLASTY performed by Urbano Munoz MD at [address] [facility]. [address]       Family History   Problem Relation Age of Onset    Heart Disease Mother     High Cholesterol Mother     High Blood Pressure Mother     Stroke Father     Heart Disease Father     Heart Disease Paternal Aunt      Social History     Tobacco Use    Smoking status: Never Smoker    Smokeless tobacco: Never Used   Substance Use Topics    Alcohol use: No     Alcohol/week: 0.0 standard drinks      Allergies   Allergen Reactions    Metoprolol      Nausea  \"felt bad\"    Mirtazapine     Zoloft [Sertraline Hcl]      Shaking and more anxiety    Sertraline Anxiety     Shaking and more anxiety     Current Outpatient Medications on File Prior to Visit   Medication Sig Dispense Refill    aspirin 81 MG EC tablet Take 81 mg by mouth daily      levothyroxine (SYNTHROID) 50 MCG tablet Take 1 tablet by mouth daily 90 tablet 3    vitamin B-12 (CYANOCOBALAMIN) 1000 MCG tablet Take 2,000 mcg by mouth daily      Multiple Vitamins-Minerals (THERAPEUTIC MULTIVITAMIN-MINERALS) tablet Take 1 tablet by mouth every other day       No current facility-administered medications on file prior to visit. Review of Systems   Constitutional: Negative for activity change and fatigue. HENT: Negative for congestion, postnasal drip and rhinorrhea. Respiratory: Negative for chest tightness and shortness of breath. Cardiovascular: Negative for leg swelling. Gastrointestinal: Positive for constipation (Dulcolax, prune juice). Negative for diarrhea. Genitourinary: Negative for difficulty urinating. Musculoskeletal: Positive for arthralgias (left hip) and back pain. Neurological: Negative for dizziness and light-headedness. Psychiatric/Behavioral: Negative for dysphoric mood. The patient is nervous/anxious. OBJECTIVE:    /80   Temp 98.1 °F (36.7 °C)   Wt 141 lb (64 kg)   BMI 22.08 kg/m²    Physical Exam  Constitutional:       Appearance: She is well-developed. HENT:      Head: Normocephalic and atraumatic. Right Ear: External ear normal.      Left Ear: External ear normal.      Nose: Nose normal.   Neck:      Musculoskeletal: Normal range of motion and neck supple. Thyroid: No thyromegaly. Vascular: No JVD. Trachea: No tracheal deviation. Cardiovascular:      Rate and Rhythm: Normal rate and regular rhythm. Heart sounds: Normal heart sounds. Pulmonary:      Effort: Pulmonary effort is normal. No respiratory distress. Breath sounds: Normal breath sounds. No rales. Lymphadenopathy:      Cervical: No cervical adenopathy. Skin:     General: Skin is warm and dry. Neurological:      Mental Status: She is alert and oriented to person, place, and time. Psychiatric:         Mood and Affect: Mood is anxious. Behavior: Behavior normal.         ASSESSMENT/PLAN:    Nikko Velásquez was seen today for follow-up.     Diagnoses and all orders for this visit:    Anxiety  -     LORazepam (ATIVAN) 0.5 MG tablet; Take 1 tablet by mouth every 8 hours as needed for Anxiety for up to 30 days. Constipation due to outlet dysfunction  Doing fairly well with the use of Dulcolax. She will actually try switching to MiraLAX. Acquired hypothyroidism  Stable on current medication    Paroxysmal atrial fibrillation (HCC)  There is some question of the diagnosis. She is unable to take metoprolol but her rate is good off of metoprolol. Imbalance  Patient has had some difficulty regaining good mobility after her hip replacement. He seems to have little more weakness with her legs rather than a balance problem. Return in about 3 months (around 10/15/2020). Please note portions of this note were completed with a voicerecognition program.  Efforts were made to edit the dictations but occasionally words are mis-transcribed.

## 2020-08-27 ENCOUNTER — TELEPHONE (OUTPATIENT)
Dept: FAMILY MEDICINE CLINIC | Age: 85
End: 2020-08-27

## 2020-08-27 NOTE — TELEPHONE ENCOUNTER
Called and spoke with patient. Patient has two concerns. First concern is that recently she has had an increase in fatigue and feeling tried all the time. Second concern is that patient has noticed that the last few days her stool has been grey/cordell in color. Patient denies any changes in diet and medications. Patient has a history of IBS and constipation. Patient has complaints of some nausea. No vomiting. Please advise.

## 2020-08-27 NOTE — TELEPHONE ENCOUNTER
----- Message from Landon Brionna sent at 8/27/2020 10:07 AM EDT -----  Subject: Message to Provider    QUESTIONS  Information for Provider? patient has a concern about her discolored stool     ---------------------------------------------------------------------------  --------------  CALL BACK INFO  What is the best way for the office to contact you? OK to leave message on   voicemail  Preferred Call Back Phone Number? [phone]  ---------------------------------------------------------------------------  --------------  SCRIPT ANSWERS  Relationship to Patient? Self  Appointment reason? Symptomatic  Select script based on patient symptoms? Adult No Script  (Patient requests to see the provider urgently  today or tomorrow. )? No  (Is the patient requesting to see the provider for a procedure?)? No  (Is the patient requesting to be seen routinely (not today or tomorrow)?    No

## 2020-08-28 NOTE — TELEPHONE ENCOUNTER
Called patient and left message. Lab orders have been placed. Need to know where patient would like to go to have labs completed. And also need to schedule with Dr. Vinita Lucas next week.

## 2020-08-31 ENCOUNTER — HOSPITAL ENCOUNTER (OUTPATIENT)
Age: 85
Discharge: HOME | End: 2020-08-31
Payer: MEDICARE

## 2020-08-31 LAB
A/G RATIO: 1.2 (ref 1.1–2.2)
ALBUMIN SERPL-MCNC: 4.1 G/DL (ref 3.4–5)
ALP BLD-CCNC: 193 U/L (ref 40–129)
ALT SERPL-CCNC: 72 U/L (ref 10–40)
ANION GAP SERPL CALCULATED.3IONS-SCNC: 12 MMOL/L (ref 3–16)
AST SERPL-CCNC: 24 U/L (ref 15–37)
BASOPHILS ABSOLUTE: 0 K/UL (ref 0–0.2)
BASOPHILS RELATIVE PERCENT: 0.7 %
BILIRUB SERPL-MCNC: 0.9 MG/DL (ref 0–1)
BUN BLDV-MCNC: 21 MG/DL (ref 7–20)
CALCIUM SERPL-MCNC: 10 MG/DL (ref 8.3–10.6)
CHLORIDE BLD-SCNC: 105 MMOL/L (ref 99–110)
CO2: 24 MMOL/L (ref 21–32)
CREAT SERPL-MCNC: 1.1 MG/DL (ref 0.6–1.2)
EOSINOPHILS ABSOLUTE: 0 K/UL (ref 0–0.6)
EOSINOPHILS RELATIVE PERCENT: 0.6 %
GFR AFRICAN AMERICAN: 56
GFR NON-AFRICAN AMERICAN: 46
GLOBULIN: 3.3 G/DL
GLUCOSE BLD-MCNC: 101 MG/DL (ref 70–99)
HCT VFR BLD CALC: 39 % (ref 36–48)
HEMOGLOBIN: 13.1 G/DL (ref 12–16)
LYMPHOCYTES ABSOLUTE: 1.2 K/UL (ref 1–5.1)
LYMPHOCYTES RELATIVE PERCENT: 32.5 %
MCH RBC QN AUTO: 31.7 PG (ref 26–34)
MCHC RBC AUTO-ENTMCNC: 33.5 G/DL (ref 31–36)
MCV RBC AUTO: 94.4 FL (ref 80–100)
MONOCYTES ABSOLUTE: 0.3 K/UL (ref 0–1.3)
MONOCYTES RELATIVE PERCENT: 9.2 %
NEUTROPHILS ABSOLUTE: 2.1 K/UL (ref 1.7–7.7)
NEUTROPHILS RELATIVE PERCENT: 57 %
PDW BLD-RTO: 13.1 % (ref 12.4–15.4)
PLATELET # BLD: 195 K/UL (ref 135–450)
PMV BLD AUTO: 9.4 FL (ref 5–10.5)
POTASSIUM SERPL-SCNC: 4.4 MMOL/L (ref 3.5–5.1)
RBC # BLD: 4.13 M/UL (ref 4–5.2)
SODIUM BLD-SCNC: 141 MMOL/L (ref 136–145)
TOTAL PROTEIN: 7.4 G/DL (ref 6.4–8.2)
TSH REFLEX: 2.02 UIU/ML (ref 0.27–4.2)
WBC # BLD: 3.7 K/UL (ref 4–11)

## 2020-08-31 PROCEDURE — 36415 COLL VENOUS BLD VENIPUNCTURE: CPT

## 2020-08-31 PROCEDURE — 84443 ASSAY THYROID STIM HORMONE: CPT

## 2020-08-31 PROCEDURE — 80053 COMPREHEN METABOLIC PANEL: CPT

## 2020-08-31 PROCEDURE — 85025 COMPLETE CBC W/AUTO DIFF WBC: CPT

## 2020-09-03 NOTE — PROGRESS NOTES
SUBJECTIVE:    Flavia Mackay is a 80 y.o. female who presents for a follow up visit. Chief Complaint   Patient presents with    Fatigue     Discuss labs, overall fatigue. Fatigue   This is a chronic problem. Episode onset: 6 weeks. The problem occurs constantly. Associated symptoms include arthralgias (back pain), a change in bowel habit ( constipation), fatigue, numbness ( neuropathy ) and vertigo ( with standing initially). Pertinent negatives include no fever, headaches, joint swelling, nausea or vomiting. Treatments tried: Has been out of her B12 the past 6 weeks. Constipation   This is a chronic problem. The current episode started more than 1 year ago. Stool description: LArge stool when she does have a stool followed by awatery stool. Then no BM for 2 to 3 days. The patient is not on a high fiber diet. She does not exercise regularly. There has not been adequate water intake. Pertinent negatives include no fecal incontinence, fever, nausea or vomiting. Risk factors include immobility. She has tried laxatives (Miralax and suppositories) for the symptoms. The treatment provided mild relief. Patient's medications, allergies, past medical,surgical, social and family histories were reviewed and updated as appropriate.      Past Medical History:   Diagnosis Date    Anxiety     Atrial fibrillation (HCC)     Disorder of bone and cartilage, unspecified     Fatigue     Hypertension     Migraine, unspecified, without mention of intractable migraine without mention of status migrainosus     Other disorders of kidney and ureter     Peripheral neuropathy 4/17/2015    Psychiatric problem     Risk for falls 5/20/14    Spinal stenosis     Thyroid disease     Unspecified glaucoma(365.9)     Unspecified hearing loss      Past Surgical History:   Procedure Laterality Date    APPENDECTOMY      CATARACT REMOVAL Bilateral 04/2017    COLON SURGERY      COLONOSCOPY      EYE SURGERY      HIP SURGERY Left 12/24/2019    LEFT HIP [facility]EMIARTHROPLASTY [facility] [address] [facility] [address]       Family History   Problem Relation Age of Onset    Heart Disease Mother     High Cholesterol Mother     High Blood Pressure Mother     Stroke Father     Heart Disease Father     Heart Disease Paternal Aunt      Social History     Tobacco Use    Smoking status: Never Smoker    Smokeless tobacco: Never Used   Substance Use Topics    Alcohol use: No     Alcohol/week: 0.0 standard drinks      Allergies   Allergen Reactions    Metoprolol      Nausea  \"felt bad\"    Mirtazapine     Zoloft [Sertraline Hcl]      Shaking and more anxiety    Sertraline Anxiety     Shaking and more anxiety     Current Outpatient Medications on File Prior to Visit   Medication Sig Dispense Refill    LORazepam (ATIVAN) 0.5 MG tablet Take 0.5 mg by mouth every 8 hours as needed for Anxiety.  aspirin 81 MG EC tablet Take 81 mg by mouth daily      levothyroxine (SYNTHROID) 50 MCG tablet Take 1 tablet by mouth daily 90 tablet 3    Multiple Vitamins-Minerals (THERAPEUTIC MULTIVITAMIN-MINERALS) tablet Take 1 tablet by mouth every other day      vitamin B-12 (CYANOCOBALAMIN) 1000 MCG tablet Take 2,000 mcg by mouth daily       No current facility-administered medications on file prior to visit. Review of Systems   Constitutional: Positive for fatigue. Negative for fever. Respiratory: Positive for shortness of breath. Gastrointestinal: Positive for change in bowel habit ( constipation) and constipation. Negative for nausea and vomiting. Musculoskeletal: Positive for arthralgias (back pain). Negative for joint swelling. Neurological: Positive for vertigo ( with standing initially) and numbness ( neuropathy ). Negative for headaches.        OBJECTIVE:    /80   Temp 97.3 °F (36.3 °C)   Wt 141 lb (64 kg)   BMI 22.08 kg/m²    Physical Exam  Constitutional:       Appearance: She is well-developed. HENT:      Head: Normocephalic and atraumatic. Right Ear: Tympanic membrane and external ear normal. Decreased hearing noted. Left Ear: Tympanic membrane and external ear normal. Decreased hearing noted. Nose: Nose normal.      Mouth/Throat:      Mouth: Mucous membranes are moist.      Pharynx: No posterior oropharyngeal erythema. Eyes:      General:         Right eye: No discharge. Conjunctiva/sclera: Conjunctivae normal.   Neck:      Musculoskeletal: Normal range of motion and neck supple. Thyroid: No thyromegaly. Vascular: No JVD. Trachea: No tracheal deviation. Cardiovascular:      Rate and Rhythm: Normal rate and regular rhythm. Heart sounds: Normal heart sounds. Pulmonary:      Effort: Pulmonary effort is normal. No respiratory distress. Breath sounds: Normal breath sounds. No rales. Abdominal:      General: Abdomen is flat. There is no distension. Palpations: Abdomen is soft. There is no mass. Tenderness: There is abdominal tenderness ( mild diffuse). There is no guarding or rebound. Hernia: No hernia is present. Lymphadenopathy:      Cervical: No cervical adenopathy. Skin:     General: Skin is warm and dry. Neurological:      Mental Status: She is alert and oriented to person, place, and time. Psychiatric:         Mood and Affect: Mood normal.         Behavior: Behavior normal.         ASSESSMENT/PLAN:    Danya Sánchez was seen today for fatigue.     Diagnoses and all orders for this visit:    Elevated liver enzymes  -     GUNNAR - Alvin Duane, MD, Gastroenterology, [facility]    Essential hypertension  In good control on current medication    Idiopathic peripheral neuropathy  Stable    Acquired hypothyroidism  Recent TSH 2.02    Chronic idiopathic constipation  -     GUNNAR - Alvin Duane, MD, Gastroenterology, [facility]  While waiting to see Dr. Melendez [place] of asked patient to start Travis 8 oz with one tablespoonful of Benefiber once daily. Chronic fatigue  Labs are reviewed. Alkaline phosphatase done on 8/31/2020 was elevated at 193 and ALT was elevated at 72. The remainder of the CMP was normal.  CBC revealed a slightly decreased WBC at 3.7. Hemoglobin 13.1, hematocrit 39, platelet count 794, all within normal range      Return for regularly scheduled follow-up. Please note portions of this note were completed with a voicerecognition program.  Efforts were made to edit the dictations but occasionally words are mis-transcribed.

## 2020-09-04 ENCOUNTER — OFFICE VISIT (OUTPATIENT)
Dept: FAMILY MEDICINE CLINIC | Age: 85
End: 2020-09-04
Payer: MEDICARE

## 2020-09-04 VITALS
TEMPERATURE: 97.3 F | SYSTOLIC BLOOD PRESSURE: 124 MMHG | BODY MASS INDEX: 22.08 KG/M2 | DIASTOLIC BLOOD PRESSURE: 80 MMHG | WEIGHT: 141 LBS

## 2020-09-04 PROCEDURE — G8427 DOCREV CUR MEDS BY ELIG CLIN: HCPCS | Performed by: FAMILY MEDICINE

## 2020-09-04 PROCEDURE — 1090F PRES/ABSN URINE INCON ASSESS: CPT | Performed by: FAMILY MEDICINE

## 2020-09-04 PROCEDURE — 4040F PNEUMOC VAC/ADMIN/RCVD: CPT | Performed by: FAMILY MEDICINE

## 2020-09-04 PROCEDURE — 1123F ACP DISCUSS/DSCN MKR DOCD: CPT | Performed by: FAMILY MEDICINE

## 2020-09-04 PROCEDURE — 99214 OFFICE O/P EST MOD 30 MIN: CPT | Performed by: FAMILY MEDICINE

## 2020-09-04 PROCEDURE — 1036F TOBACCO NON-USER: CPT | Performed by: FAMILY MEDICINE

## 2020-09-04 PROCEDURE — G8420 CALC BMI NORM PARAMETERS: HCPCS | Performed by: FAMILY MEDICINE

## 2020-09-04 RX ORDER — LORAZEPAM 0.5 MG/1
0.5 TABLET ORAL EVERY 8 HOURS PRN
COMMUNITY
End: 2021-03-04 | Stop reason: SDUPTHER

## 2020-09-04 ASSESSMENT — ENCOUNTER SYMPTOMS
CHANGE IN BOWEL HABIT: 1
CONSTIPATION: 1
VOMITING: 0
NAUSEA: 0
SHORTNESS OF BREATH: 1

## 2020-09-24 ENCOUNTER — HOSPITAL ENCOUNTER (OUTPATIENT)
Age: 85
Discharge: HOME | End: 2020-09-24
Payer: MEDICARE

## 2020-09-24 LAB
FERRITIN: 55.6 NG/ML (ref 15–150)
HAV IGM SER IA-ACNC: NORMAL
HBV SURFACE AB TITR SER: <3.5 MIU/ML
HEPATITIS B SURFACE ANTIGEN INTERPRETATION: NORMAL
HEPATITIS C ANTIBODY INTERPRETATION: NORMAL
IRON SATURATION: 41 % (ref 15–50)
IRON: 115 UG/DL (ref 37–145)
TOTAL IRON BINDING CAPACITY: 280 UG/DL (ref 260–445)
TSH SERPL DL<=0.05 MIU/L-ACNC: 1.52 UIU/ML (ref 0.27–4.2)

## 2020-09-24 PROCEDURE — 83516 IMMUNOASSAY NONANTIBODY: CPT

## 2020-09-24 PROCEDURE — 86706 HEP B SURFACE ANTIBODY: CPT

## 2020-09-24 PROCEDURE — 84155 ASSAY OF PROTEIN SERUM: CPT

## 2020-09-24 PROCEDURE — 82728 ASSAY OF FERRITIN: CPT

## 2020-09-24 PROCEDURE — 87340 HEPATITIS B SURFACE AG IA: CPT

## 2020-09-24 PROCEDURE — 86803 HEPATITIS C AB TEST: CPT

## 2020-09-24 PROCEDURE — 84165 PROTEIN E-PHORESIS SERUM: CPT

## 2020-09-24 PROCEDURE — 36415 COLL VENOUS BLD VENIPUNCTURE: CPT

## 2020-09-24 PROCEDURE — 84443 ASSAY THYROID STIM HORMONE: CPT

## 2020-09-24 PROCEDURE — 83550 IRON BINDING TEST: CPT

## 2020-09-24 PROCEDURE — 86038 ANTINUCLEAR ANTIBODIES: CPT

## 2020-09-24 PROCEDURE — 86709 HEPATITIS A IGM ANTIBODY: CPT

## 2020-09-24 PROCEDURE — 83540 ASSAY OF IRON: CPT

## 2020-09-24 PROCEDURE — 86376 MICROSOMAL ANTIBODY EACH: CPT

## 2020-09-24 PROCEDURE — 86708 HEPATITIS A ANTIBODY: CPT

## 2020-09-25 LAB
ALBUMIN SERPL-MCNC: 3.7 G/DL (ref 3.1–4.9)
ALPHA-1-GLOBULIN: 0.2 G/DL (ref 0.2–0.4)
ALPHA-2-GLOBULIN: 0.6 G/DL (ref 0.4–1.1)
ANTI-NUCLEAR ANTIBODY (ANA): NEGATIVE
BETA GLOBULIN: 0.9 G/DL (ref 0.9–1.6)
GAMMA GLOBULIN: 1.4 G/DL (ref 0.6–1.8)
SPE/IFE INTERPRETATION: NORMAL
TISSUE TRANSGLUTAMINASE IGA: <0.5 U/ML (ref 0–14)
TOTAL PROTEIN: 6.8 G/DL (ref 6.4–8.2)

## 2020-09-27 LAB
F-ACTIN AB IGG: 14 UNITS (ref 0–19)
HAV AB SERPL IA-ACNC: NEGATIVE
LIVER-KIDNEY MICROSOME-1 AB IGG: 0.9 U (ref 0–24.9)
MITOCHONDRIAL M2 AB, IGG: 2.7 UNITS (ref 0–24.9)

## 2020-10-14 ENCOUNTER — OFFICE VISIT (OUTPATIENT)
Dept: FAMILY MEDICINE CLINIC | Age: 85
End: 2020-10-14
Payer: MEDICARE

## 2020-10-14 VITALS
WEIGHT: 141 LBS | TEMPERATURE: 98.6 F | DIASTOLIC BLOOD PRESSURE: 80 MMHG | BODY MASS INDEX: 22.08 KG/M2 | SYSTOLIC BLOOD PRESSURE: 126 MMHG

## 2020-10-14 PROCEDURE — 1090F PRES/ABSN URINE INCON ASSESS: CPT | Performed by: FAMILY MEDICINE

## 2020-10-14 PROCEDURE — 99213 OFFICE O/P EST LOW 20 MIN: CPT | Performed by: FAMILY MEDICINE

## 2020-10-14 PROCEDURE — 1123F ACP DISCUSS/DSCN MKR DOCD: CPT | Performed by: FAMILY MEDICINE

## 2020-10-14 PROCEDURE — 4040F PNEUMOC VAC/ADMIN/RCVD: CPT | Performed by: FAMILY MEDICINE

## 2020-10-14 PROCEDURE — G8484 FLU IMMUNIZE NO ADMIN: HCPCS | Performed by: FAMILY MEDICINE

## 2020-10-14 PROCEDURE — 1036F TOBACCO NON-USER: CPT | Performed by: FAMILY MEDICINE

## 2020-10-14 PROCEDURE — G8427 DOCREV CUR MEDS BY ELIG CLIN: HCPCS | Performed by: FAMILY MEDICINE

## 2020-10-14 PROCEDURE — G8420 CALC BMI NORM PARAMETERS: HCPCS | Performed by: FAMILY MEDICINE

## 2020-10-14 ASSESSMENT — ENCOUNTER SYMPTOMS
NAUSEA: 1
ABDOMINAL PAIN: 0

## 2020-10-14 NOTE — PROGRESS NOTES
SUBJECTIVE:    Geoffry Canavan is a 80 y.o. female who presents for a follow up visit. Chief Complaint   Patient presents with    Follow-up     Patient is here for a follow up. No new concerns. Declines flu vaccine. Fatigue   This is a chronic problem. The current episode started more than 1 year ago. The problem occurs constantly. The problem has been waxing and waning. Associated symptoms include fatigue, nausea ( mild) and numbness ( feet and trouble with balance). Pertinent negatives include no abdominal pain or fever. The symptoms are aggravated by exertion. She has tried rest for the symptoms. The treatment provided no relief. Neuropathy  This is a chronic problem. She complains of persistent numbness in her feet. She has pins-and-needles sensation and complains of difficulty with balance. Patient's medications, allergies, past medical,surgical, social and family histories were reviewed and updated as appropriate.      Past Medical History:   Diagnosis Date    Anxiety     Atrial fibrillation (HCC)     Disorder of bone and cartilage, unspecified     Fatigue     Hypertension     Migraine, unspecified, without mention of intractable migraine without mention of status migrainosus     Other disorders of kidney and ureter     Peripheral neuropathy 4/17/2015    Psychiatric problem     Risk for falls 5/20/14    Spinal stenosis     Thyroid disease     Unspecified glaucoma(365.9)     Unspecified hearing loss      Past Surgical History:   Procedure Laterality Date    APPENDECTOMY      CATARACT REMOVAL Bilateral 04/2017    COLON SURGERY      COLONOSCOPY      EYE SURGERY      HIP SURGERY Left 12/24/2019    LEFT HIP HEMIARTHROPLASTY [facility] [address]       Family History   Problem Relation Age of Onset    Heart Disease Mother     High Cholesterol Mother     High Blood Pressure Mother     Stroke Father     Heart Disease Father     Heart Disease Paternal Aunt      Social History     Tobacco Use    Smoking status: Never Smoker    Smokeless tobacco: Never Used   Substance Use Topics    Alcohol use: No     Alcohol/week: 0.0 standard drinks      Allergies   Allergen Reactions    Metoprolol      Nausea  \"felt bad\"    Mirtazapine     Zoloft [Sertraline Hcl]      Shaking and more anxiety    Sertraline Anxiety     Shaking and more anxiety     Current Outpatient Medications on File Prior to Visit   Medication Sig Dispense Refill    LORazepam (ATIVAN) 0.5 MG tablet Take 0.5 mg by mouth every 8 hours as needed for Anxiety.  aspirin 81 MG EC tablet Take 81 mg by mouth daily      levothyroxine (SYNTHROID) 50 MCG tablet Take 1 tablet by mouth daily 90 tablet 3    vitamin B-12 (CYANOCOBALAMIN) 1000 MCG tablet Take 2,000 mcg by mouth daily      Multiple Vitamins-Minerals (THERAPEUTIC MULTIVITAMIN-MINERALS) tablet Take 1 tablet by mouth every other day       No current facility-administered medications on file prior to visit. Review of Systems   Constitutional: Positive for fatigue. Negative for activity change and fever. Gastrointestinal: Positive for nausea ( mild). Negative for abdominal pain. Neurological: Positive for numbness ( feet and trouble with balance). OBJECTIVE:    /80   Temp 98.6 °F (37 °C)   Wt 141 lb (64 kg)   BMI 22.08 kg/m²    Physical Exam  Constitutional:       Appearance: Normal appearance. HENT:      Head: Normocephalic and atraumatic. Neck:      Musculoskeletal: Neck supple. Cardiovascular:      Rate and Rhythm: Normal rate and regular rhythm. Pulses: Normal pulses. Heart sounds: Normal heart sounds. Pulmonary:      Effort: Pulmonary effort is normal.      Breath sounds: Normal breath sounds. Musculoskeletal:      Right lower leg: No edema. Left lower leg: No edema. Skin:     General: Skin is warm and dry.    Neurological:      Mental Status: She is alert and oriented to person, place, and time. Gait: Gait abnormal ( slow and cautious). Psychiatric:         Mood and Affect: Mood normal.         Behavior: Behavior normal.         ASSESSMENT/PLAN:    Vijay Dominguez was seen today for follow-up. Diagnoses and all orders for this visit:    Idiopathic peripheral neuropathy  -     External Referral To Neurology    Essential hypertension  -     Comprehensive Metabolic Panel, Fasting; Future  -     CBC Auto Differential; Future    Paroxysmal atrial fibrillation (HCC)    Acquired hypothyroidism  TSH 2.02    Anxiety state  Stable on current medications    Hyperglycemia  -     Hemoglobin A1C; Future    Mixed hyperlipidemia  -     Lipid, Fasting; Future        Return in about 4 months (around 2/14/2021). Please note portions of this note were completed with a voicerecognition program.  Efforts were made to edit the dictations but occasionally words are mis-transcribed.

## 2020-10-20 ENCOUNTER — HOSPITAL ENCOUNTER (OUTPATIENT)
Age: 85
Discharge: HOME | End: 2020-10-20
Payer: MEDICARE

## 2020-10-20 LAB
ALBUMIN SERPL-MCNC: 4.3 G/DL (ref 3.4–5)
ALP BLD-CCNC: 170 U/L (ref 40–129)
ALT SERPL-CCNC: 33 U/L (ref 10–40)
AST SERPL-CCNC: 18 U/L (ref 15–37)
BILIRUB SERPL-MCNC: 0.8 MG/DL (ref 0–1)
BILIRUBIN DIRECT: 0.3 MG/DL (ref 0–0.3)
BILIRUBIN, INDIRECT: 0.5 MG/DL (ref 0–1)
TOTAL PROTEIN: 7.1 G/DL (ref 6.4–8.2)

## 2020-10-20 PROCEDURE — 36415 COLL VENOUS BLD VENIPUNCTURE: CPT

## 2020-10-20 PROCEDURE — 80076 HEPATIC FUNCTION PANEL: CPT

## 2021-01-12 ENCOUNTER — TELEPHONE (OUTPATIENT)
Dept: FAMILY MEDICINE CLINIC | Age: 86
End: 2021-01-12

## 2021-01-12 NOTE — TELEPHONE ENCOUNTER
OK to use Imodium for diarrhea. Stay hydrated Watch for increasing SOB. Have them check O2 sat if SOB.

## 2021-01-12 NOTE — TELEPHONE ENCOUNTER
Patient called to inform Dr Ekaterina Lemon she and her spouse tested positive today for COVID. She was tested in her assisted living facility. She had symptoms of stomach ache, diarrhea, weak 4 days ago. She wants to know what she should do.       Please advise

## 2021-01-22 ENCOUNTER — APPOINTMENT (OUTPATIENT)
Dept: GENERAL RADIOLOGY | Age: 86
End: 2021-01-22
Payer: MEDICARE

## 2021-01-22 ENCOUNTER — APPOINTMENT (OUTPATIENT)
Dept: CT IMAGING | Age: 86
End: 2021-01-22
Payer: MEDICARE

## 2021-01-22 ENCOUNTER — TELEPHONE (OUTPATIENT)
Dept: FAMILY MEDICINE CLINIC | Age: 86
End: 2021-01-22

## 2021-01-22 ENCOUNTER — HOSPITAL ENCOUNTER (EMERGENCY)
Age: 86
Discharge: HOME | End: 2021-01-22
Attending: EMERGENCY MEDICINE
Payer: MEDICARE

## 2021-01-22 VITALS
RESPIRATION RATE: 22 BRPM | HEART RATE: 70 BPM | HEIGHT: 66 IN | OXYGEN SATURATION: 94 % | DIASTOLIC BLOOD PRESSURE: 59 MMHG | WEIGHT: 140 LBS | SYSTOLIC BLOOD PRESSURE: 123 MMHG | TEMPERATURE: 98 F | BODY MASS INDEX: 22.5 KG/M2

## 2021-01-22 DIAGNOSIS — E87.1 HYPONATREMIA: ICD-10-CM

## 2021-01-22 DIAGNOSIS — R11.0 NAUSEA: Primary | ICD-10-CM

## 2021-01-22 LAB
ANION GAP SERPL CALCULATED.3IONS-SCNC: 11 MMOL/L (ref 3–16)
BASE EXCESS VENOUS: 1.2 MMOL/L (ref -3–3)
BASOPHILS ABSOLUTE: 0 K/UL (ref 0–0.2)
BASOPHILS RELATIVE PERCENT: 0.4 %
BILIRUBIN URINE: NEGATIVE
BLOOD, URINE: NEGATIVE
BUN BLDV-MCNC: 15 MG/DL (ref 7–20)
CALCIUM SERPL-MCNC: 9 MG/DL (ref 8.3–10.6)
CARBOXYHEMOGLOBIN: 2.7 % (ref 0–1.5)
CHLORIDE BLD-SCNC: 94 MMOL/L (ref 99–110)
CLARITY: CLEAR
CO2: 23 MMOL/L (ref 21–32)
COLOR: YELLOW
CREAT SERPL-MCNC: 1.2 MG/DL (ref 0.6–1.2)
EKG ATRIAL RATE: 66 BPM
EKG DIAGNOSIS: NORMAL
EKG Q-T INTERVAL: 392 MS
EKG QRS DURATION: 70 MS
EKG QTC CALCULATION (BAZETT): 410 MS
EKG R AXIS: -52 DEGREES
EKG T AXIS: 66 DEGREES
EKG VENTRICULAR RATE: 66 BPM
EOSINOPHILS ABSOLUTE: 0 K/UL (ref 0–0.6)
EOSINOPHILS RELATIVE PERCENT: 0.2 %
EPITHELIAL CELLS, UA: 4 /HPF (ref 0–5)
GFR AFRICAN AMERICAN: 51
GFR NON-AFRICAN AMERICAN: 42
GLUCOSE BLD-MCNC: 103 MG/DL (ref 70–99)
GLUCOSE URINE: NEGATIVE MG/DL
HCO3 VENOUS: 25 MMOL/L (ref 23–29)
HCT VFR BLD CALC: 38.8 % (ref 36–48)
HEMOGLOBIN, VEN, REDUCED: 3 %
HEMOGLOBIN: 13.3 G/DL (ref 12–16)
HYALINE CASTS: 2 /LPF (ref 0–8)
INR BLD: 1.13 (ref 0.86–1.14)
KETONES, URINE: NEGATIVE MG/DL
LACTIC ACID: 1.1 MMOL/L (ref 0.4–2)
LEUKOCYTE ESTERASE, URINE: ABNORMAL
LYMPHOCYTES ABSOLUTE: 0.7 K/UL (ref 1–5.1)
LYMPHOCYTES RELATIVE PERCENT: 18.5 %
MCH RBC QN AUTO: 30.9 PG (ref 26–34)
MCHC RBC AUTO-ENTMCNC: 34.3 G/DL (ref 31–36)
MCV RBC AUTO: 90.2 FL (ref 80–100)
METHEMOGLOBIN VENOUS: 0 %
MICROSCOPIC EXAMINATION: YES
MONOCYTES ABSOLUTE: 0.5 K/UL (ref 0–1.3)
MONOCYTES RELATIVE PERCENT: 11.9 %
NEUTROPHILS ABSOLUTE: 2.6 K/UL (ref 1.7–7.7)
NEUTROPHILS RELATIVE PERCENT: 69 %
NITRITE, URINE: NEGATIVE
O2 CONTENT, VEN: 18 VOL %
O2 SAT, VEN: 97 %
O2 THERAPY: ABNORMAL
PCO2, VEN: 36 MMHG (ref 40–50)
PDW BLD-RTO: 12.8 % (ref 12.4–15.4)
PH UA: 5.5 (ref 5–8)
PH VENOUS: 7.45 (ref 7.35–7.45)
PLATELET # BLD: 195 K/UL (ref 135–450)
PMV BLD AUTO: 8.1 FL (ref 5–10.5)
PO2, VEN: 79.6 MMHG (ref 25–40)
POTASSIUM REFLEX MAGNESIUM: 4.2 MMOL/L (ref 3.5–5.1)
PROTEIN UA: NEGATIVE MG/DL
PROTHROMBIN TIME: 13.1 SEC (ref 10–13.2)
RBC # BLD: 4.3 M/UL (ref 4–5.2)
RBC UA: 1 /HPF (ref 0–4)
SODIUM BLD-SCNC: 128 MMOL/L (ref 136–145)
SPECIFIC GRAVITY UA: 1.01 (ref 1–1.03)
TCO2 CALC VENOUS: 58 MMOL/L
TROPONIN: <0.01 NG/ML
URINE TYPE: ABNORMAL
UROBILINOGEN, URINE: 0.2 E.U./DL
WBC # BLD: 3.8 K/UL (ref 4–11)
WBC UA: 3 /HPF (ref 0–5)

## 2021-01-22 PROCEDURE — 85610 PROTHROMBIN TIME: CPT

## 2021-01-22 PROCEDURE — 96360 HYDRATION IV INFUSION INIT: CPT

## 2021-01-22 PROCEDURE — 81001 URINALYSIS AUTO W/SCOPE: CPT

## 2021-01-22 PROCEDURE — 83605 ASSAY OF LACTIC ACID: CPT

## 2021-01-22 PROCEDURE — 85025 COMPLETE CBC W/AUTO DIFF WBC: CPT

## 2021-01-22 PROCEDURE — 84484 ASSAY OF TROPONIN QUANT: CPT

## 2021-01-22 PROCEDURE — 71045 X-RAY EXAM CHEST 1 VIEW: CPT

## 2021-01-22 PROCEDURE — 82803 BLOOD GASES ANY COMBINATION: CPT

## 2021-01-22 PROCEDURE — 93005 ELECTROCARDIOGRAM TRACING: CPT | Performed by: EMERGENCY MEDICINE

## 2021-01-22 PROCEDURE — 99283 EMERGENCY DEPT VISIT LOW MDM: CPT

## 2021-01-22 PROCEDURE — 80048 BASIC METABOLIC PNL TOTAL CA: CPT

## 2021-01-22 PROCEDURE — 2580000003 HC RX 258: Performed by: PHYSICIAN ASSISTANT

## 2021-01-22 PROCEDURE — 93010 ELECTROCARDIOGRAM REPORT: CPT | Performed by: INTERNAL MEDICINE

## 2021-01-22 RX ORDER — ONDANSETRON 2 MG/ML
4 INJECTION INTRAMUSCULAR; INTRAVENOUS ONCE
Status: DISCONTINUED | OUTPATIENT
Start: 2021-01-22 | End: 2021-01-22 | Stop reason: HOSPADM

## 2021-01-22 RX ORDER — 0.9 % SODIUM CHLORIDE 0.9 %
1000 INTRAVENOUS SOLUTION INTRAVENOUS ONCE
Status: COMPLETED | OUTPATIENT
Start: 2021-01-22 | End: 2021-01-22

## 2021-01-22 RX ADMIN — SODIUM CHLORIDE 1000 ML: 9 INJECTION, SOLUTION INTRAVENOUS at 14:54

## 2021-01-22 ASSESSMENT — ENCOUNTER SYMPTOMS
DIARRHEA: 0
NAUSEA: 1
ABDOMINAL PAIN: 0
VOMITING: 0
RHINORRHEA: 0
COUGH: 0
SHORTNESS OF BREATH: 0

## 2021-01-22 NOTE — ED PROVIDER NOTES
As physician-in-triage, I performed a medical screening history and physical exam on Bradley Esqueda. I also cared for and evaluated the patient in conjunction with the ED Advanced Practice Provider. All diagnostic, treatment, and disposition decisions were made by myself in conjunction with the advanced practice provider. For all further details of the patient's emergency department visit, please see the advanced practice provider's documentation. Patient presents to the ER for evaluation of positive generalized fatigue and weakness recent diagnosis of COVID-19 she is not hypoxic she is afebrile normal tensive. No rebound or guarding of her abdomen, mild nausea. No severe diarrhea. No rectal bleeding. She stable for outpatient management symptomatic treatment, return if hypoxia. Evidence of pneumonia.       Impression: Fatigue, weakness, [id number]      Robb Castillo MD  55/87/32 [id number]

## 2021-01-22 NOTE — ED NOTES
Discharge instructions received & reviewed. VS stable, alert & transferred to the  without incident.        Ina Cook RN  01/22/21 0072

## 2021-01-22 NOTE — TELEPHONE ENCOUNTER
[facility] calling-   Lungs clear  Dx with covid   O2 94  Very weak   B/P 80/52  Nausea /vomiting   Per Dr. Paul Nava pt needs to go to e/r

## 2021-01-22 NOTE — ED PROVIDER NOTES
[facility]        Pt Name: Marissa Serrano  MRN: [id number]  [place] 4/17/1928  Date of evaluation: 1/22/2021  Provider: Yonny Haider PA-C  PCP: Kerry Osullivan MD     I have seen and evaluated this patient with my supervising physician Galilea Fenton MD.    [address] [place] [address]       Chief Complaint   Patient presents with    Fatigue     PT IN FROM [facility] FROM  [facility] REPORTING WEAKNESS AND NAUSEA        HISTORY OF PRESENT ILLNESS   (Location, Timing/Onset, Context/Setting, Quality, Duration, Modifying Factors, Severity, Associated Signs and Symptoms)  Note limiting factors. Marissa Serrano is a 80 y.o. female presents to the emergency department today for evaluation for general fatigue, and concerns of dehydration. The patient has a history of hypertension, atrial fibrillation, and anxiety. The patient states that she tested positive for COVID-19 2 weeks ago. The patient states that she got tested because she was experiencing a generalized abdominal cramping, and nausea. The patient states that last week her nausea, abdominal pain seemed to worsen, and she states that she actually had diarrhea for several days. The patient states that she was seen at an outside facility, she states that a CT of her abdomen was performed and was negative. The patient states that she was given some medication for her nausea, she states that overall the abdominal pain and cramping has resolved. She states that diarrhea has resolved. She states that she is still little nauseous although this seems to be improving as well. The patient states that when the nurse came today to check her blood pressure it was 89 systolic. When I evaluate the patient on blood pressure is 115/54. The patient states that she was sent to the ED for concerns of dehydration.   The patient states that she has had a dry intermittent cough, she states LEVOTHYROXINE (SYNTHROID) 50 MCG TABLET    Take 1 tablet by mouth daily    LORAZEPAM (ATIVAN) 0.5 MG TABLET    Take 0.5 mg by mouth every 8 hours as needed for Anxiety. MULTIPLE VITAMINS-MINERALS (THERAPEUTIC MULTIVITAMIN-MINERALS) TABLET    Take 1 tablet by mouth every other day    VITAMIN B-12 (CYANOCOBALAMIN) 1000 MCG TABLET    Take 2,000 mcg by mouth daily         ALLERGIES     Metoprolol, Mirtazapine, Zoloft [sertraline hcl], and Sertraline    FAMILYHISTORY       Family History   Problem Relation Age of Onset    Heart Disease Mother     High Cholesterol Mother     High Blood Pressure Mother     Stroke Father     Heart Disease Father     Heart Disease Paternal Aunt           SOCIAL HISTORY       Social History     Tobacco Use    Smoking status: Never Smoker    Smokeless tobacco: Never Used   Substance Use Topics    Alcohol use: No     Alcohol/week: 0.0 standard drinks    Drug use: No       SCREENINGS             PHYSICAL EXAM    (up to 7 for level 4, 8 or more for level 5)     ED Triage Vitals [01/22/21 1323]   BP Temp Temp src Pulse Resp SpO2 Height Weight   115/71 98 °F (36.7 °C) -- 65 18 96 % 5' 6\" (1.676 m) 140 lb (63.5 kg)       Physical Exam  Vitals signs and nursing note reviewed. Constitutional:       Appearance: She is well-developed. She is not diaphoretic. HENT:      Head: Normocephalic and atraumatic. Right Ear: External ear normal.      Left Ear: External ear normal.      Nose: Nose normal.   Eyes:      General:         Right eye: No discharge. Left eye: No discharge. Neck:      Musculoskeletal: Normal range of motion and neck supple. Trachea: No tracheal deviation. Cardiovascular:      Rate and Rhythm: Normal rate and regular rhythm. Heart sounds: No murmur. Pulmonary:      Effort: Pulmonary effort is normal. No respiratory distress. Breath sounds: No wheezing or rales.       Comments: Diminished aeration to bilateral bases  Abdominal:      General: PROTIME-INR    Narrative:     Performed at:  [facility]  [address],  [place], [address]   Phone [phone]   LACTIC ACID, PLASMA    Narrative:     Performed at:  [facility]  [address],  [place], [address]   Phone [phone]   MICROSCOPIC URINALYSIS    Narrative:     Performed at:  [facility]  [address],  [place], [address]   Phone [phone]       All other labs were within normal range or not returned as of this dictation. EKG: All EKG's are interpreted by the Emergency Department Physician in the absence of a cardiologist.  Please see their note for interpretation of EKG. RADIOLOGY:   Non-plain film images such as CT, Ultrasound and MRI are read by the radiologist. Plain radiographic images are visualized and preliminarily interpreted by the ED Provider with the below findings:        Interpretation per the Radiologist below, if available at the time of this note:    XR CHEST PORTABLE   Final Result   No acute cardiac or pulmonary disease. Suspect COPD. No results found. PROCEDURES   Unless otherwise noted below, none     Procedures    CRITICAL CARE TIME   N/A    CONSULTS:  None      EMERGENCY DEPARTMENT COURSE and DIFFERENTIAL DIAGNOSIS/MDM:   Vitals:    Vitals:    01/22/21 1515 01/22/21 1530 01/22/21 1545 01/22/21 1600   BP: (!) 124/55 120/66 132/67 126/61   Pulse: 68 70 77 71   Resp: 19 18 18 19   Temp:       SpO2: 94% 96% 96% 95%   Weight:       Height:           Patient was given the following medications:  Medications   ondansetron (ZOFRAN) injection 4 mg (has no administration in time range)   0.9 % sodium chloride bolus (0 mLs Intravenous Stopped 1/22/21 1611)           Briefly, this is a 66-year-old female who presents emergency department today for evaluation for general fatigue.   The patient states that she was sent in due to concerns of dehydration. She was diagnosed with Covid 2 weeks ago. She states that overall her Covid symptoms have been a dry cough, generalized abdominal pain, cramping, and nausea. Patient states that she was in an outside facility last week, work-up was negative    Patient states that today when the nurse came in to check her blood pressure was 89 systolic, she is not hypotensive in the ED. On physical exam.  Her abdomen is benign. She does have diminished aeration to bilateral bases. CBC shows a leukopenia of 3.8 (likely due to her known COVID-19. Her BMP does show a hyponatremia of 128, otherwise is unremarkable. Troponin negative. VBG is unremarkable. Lactic acid is normal.    EKG as documented by my attending, please see his note for further details. Chest x-ray shows no acute process. Patient's urinalysis is negative for any infection    Patient was given fluids in the ED, and upon reevaluation she states that she is feeling much better. She will be given a prescription for Zofran for home. I feel that her symptoms today are likely due to mild dehydration likely sequela of COVID-19. Patient is has no troponin leak, no evidence of pneumonia, no respiratory distress, no lactic acidosis, and I do not feel that she needs any further work-up at this time. She gets routine follow-up with her primary care physician within 2 to 3 days for reevaluation. She is to return to the ED for any new or worsening symptoms. Patient voiced understanding is agreeable with plan. Stable for discharge. My suspicion is low at this time for ACS, pneumonia, pleural effusion, pneumothorax, myocarditis, pericarditis, cardiac tamponade, life-threatening arrhythmia, TAA, acute failure, respiratory distress, acute dissection or other emergent etiology at this time. FINAL IMPRESSION      1. Nausea    2.  Hyponatremia          DISPOSITION/PLAN   DISPOSITION Discharge - Pending Orders Complete 01/22/2021 03:42:03 PM      PATIENT REFERREDTO:  Alesia Mcbride MD  [address]  [phone]    Schedule an appointment as soon as possible for a visit in 2 days      [facility] Emergency Department  [address] [place] [address]  [phone]    As needed, If symptoms worsen      DISCHARGE MEDICATIONS:  New Prescriptions    No medications on file       DISCONTINUED MEDICATIONS:  Discontinued Medications    No medications on file              (Please note that portions of this note were completed with a voice recognition program.  Efforts were made to edit the dictations but occasionally words are mis-transcribed.)    Joy Fuentes PA-C (electronically signed)            Joy Fuentes PA-C  01/22/21 0652

## 2021-01-22 NOTE — CARE COORDINATION
SW contacted by [address] that they are active with patient for RN and therapy services. SW informed pt will be discharging from ED today and to resume services.       Electronically signed by HU Mckeon, ZULAY on 1/22/2021 at 4:33 PM

## 2021-01-26 ENCOUNTER — TELEPHONE (OUTPATIENT)
Dept: FAMILY MEDICINE CLINIC | Age: 86
End: 2021-01-26

## 2021-01-26 NOTE — TELEPHONE ENCOUNTER
Patient had COVID and she wants to know if she would be eligible to have the covid vaccine that is going to be given to residents in her assisted living facility and also when Dr Jose Miguel Bacon would like to do hospital follow-up.       Please advise

## 2021-01-26 NOTE — TELEPHONE ENCOUNTER
I would wait a total of 2 weeks after having tested positive for Covid. But I would recommend that you get the vaccine.

## 2021-02-05 ENCOUNTER — TELEPHONE (OUTPATIENT)
Dept: FAMILY MEDICINE CLINIC | Age: 86
End: 2021-02-05

## 2021-02-05 NOTE — TELEPHONE ENCOUNTER
----- Message from Korey Lopez sent at 2/5/2021  9:36 AM EST -----  Subject: Message to Provider    QUESTIONS  Information for Provider? PT wants to sched appt about existing condition  ---------------------------------------------------------------------------  --------------  CALL BACK INFO  What is the best way for the office to contact you? OK to leave message on   voicemail  Preferred Call Back Phone Number? [phone]  ---------------------------------------------------------------------------  --------------  SCRIPT ANSWERS  Relationship to Patient? Self  Appointment reason? Well Care/Follow Ups  Select a Well Care/Follow Ups appointment reason? Adult Existing Condition   Follow Up [Diabetes   CHF   COPD   Hypertension/Blood Pressure Check]  (Is the patient requesting to be seen urgently for their symptoms?)? No  Is this follow up request related to routine Diabetes Management? No  Are you having any new concerns about your existing condition?  Yes

## 2021-03-04 ENCOUNTER — OFFICE VISIT (OUTPATIENT)
Dept: FAMILY MEDICINE CLINIC | Age: 86
End: 2021-03-04
Payer: MEDICARE

## 2021-03-04 VITALS
SYSTOLIC BLOOD PRESSURE: 136 MMHG | WEIGHT: 141 LBS | BODY MASS INDEX: 22.76 KG/M2 | OXYGEN SATURATION: 98 % | TEMPERATURE: 97.2 F | DIASTOLIC BLOOD PRESSURE: 70 MMHG | HEART RATE: 76 BPM

## 2021-03-04 DIAGNOSIS — I48.0 PAROXYSMAL ATRIAL FIBRILLATION (HCC): Primary | ICD-10-CM

## 2021-03-04 DIAGNOSIS — E78.2 MIXED HYPERLIPIDEMIA: ICD-10-CM

## 2021-03-04 DIAGNOSIS — F41.9 ANXIETY: ICD-10-CM

## 2021-03-04 PROCEDURE — G8420 CALC BMI NORM PARAMETERS: HCPCS | Performed by: FAMILY MEDICINE

## 2021-03-04 PROCEDURE — 1090F PRES/ABSN URINE INCON ASSESS: CPT | Performed by: FAMILY MEDICINE

## 2021-03-04 PROCEDURE — G8484 FLU IMMUNIZE NO ADMIN: HCPCS | Performed by: FAMILY MEDICINE

## 2021-03-04 PROCEDURE — G8427 DOCREV CUR MEDS BY ELIG CLIN: HCPCS | Performed by: FAMILY MEDICINE

## 2021-03-04 PROCEDURE — 1036F TOBACCO NON-USER: CPT | Performed by: FAMILY MEDICINE

## 2021-03-04 PROCEDURE — 1123F ACP DISCUSS/DSCN MKR DOCD: CPT | Performed by: FAMILY MEDICINE

## 2021-03-04 PROCEDURE — 4040F PNEUMOC VAC/ADMIN/RCVD: CPT | Performed by: FAMILY MEDICINE

## 2021-03-04 PROCEDURE — 99214 OFFICE O/P EST MOD 30 MIN: CPT | Performed by: FAMILY MEDICINE

## 2021-03-04 RX ORDER — LORAZEPAM 0.5 MG/1
0.5 TABLET ORAL EVERY 8 HOURS PRN
Qty: 90 TABLET | Refills: 2 | Status: SHIPPED | OUTPATIENT
Start: 2021-03-04 | End: 2021-04-03

## 2021-03-04 ASSESSMENT — ENCOUNTER SYMPTOMS
RHINORRHEA: 0
DIARRHEA: 0
CONSTIPATION: 0
SHORTNESS OF BREATH: 1
COUGH: 0

## 2021-03-04 NOTE — PROGRESS NOTES
SUBJECTIVE:    Rita Stover is a 80 y.o. female who presents for a follow up visit. Chief Complaint   Patient presents with    Follow-up     Patient is here for a follow up. No lab. Congestive Heart Failure  Presents for follow-up visit. Associated symptoms include fatigue, palpitations and shortness of breath ( with exertion). Pertinent negatives include no chest pain. The symptoms have been stable. Compliance with total regimen is 51-75%. Compliance problems include adherence to diet and adherence to exercise. Compliance with diet is 51-75%. Compliance with exercise is 0-25%. Compliance with medications is %. Fatigue  This is a chronic problem. The current episode started more than 1 year ago. The problem occurs constantly. The problem has been gradually worsening. Associated symptoms include fatigue. Pertinent negatives include no chest pain, congestion, coughing or diaphoresis. The symptoms are aggravated by exertion. She has tried nothing for the symptoms. Atrial fibrillation  This is a chronic problem. Patient had been on Xarelto for many years and had some bleeding problems. She was switched to Eliquis for short period of time and she states that when she last saw Dr. Yeni Rock the Eliquis was discontinued. She has not seen a cardiologist in quite a while and is asking to see a new cardiologist for another opinion on whether or not she needs to resume anticoagulation. Anxiety  This is a chronic problem. Patient wants to continue lorazepam which she has used intermittently over the past few years. She does not want to try an SSRI. Patient's medications, allergies, past medical,surgical, social and family histories were reviewed and updated as appropriate.      Past Medical History:   Diagnosis Date    Anxiety     Atrial fibrillation (HCC)     Disorder of bone and cartilage, unspecified     Fatigue     Hypertension     Migraine, unspecified, without mention of intractable migraine without mention of status migrainosus     Other disorders of kidney and ureter     Peripheral neuropathy 4/17/2015    Psychiatric problem     Risk for falls 5/20/14    Spinal stenosis     Thyroid disease     Unspecified glaucoma(365.9)     Unspecified hearing loss      Past Surgical History:   Procedure Laterality Date    APPENDECTOMY      CATARACT REMOVAL Bilateral 04/2017    COLON SURGERY      COLONOSCOPY      EYE SURGERY      HIP SURGERY Left 12/24/2019    LEFT HIP [facility]IARTHROPLAST[facility] [address]       Family History   Problem Relation Age of Onset    Heart Disease Mother     High Cholesterol Mother     High Blood Pressure Mother     Stroke Father     Heart Disease Father     Heart Disease Paternal Aunt      Social History     Tobacco Use    Smoking status: Never Smoker    Smokeless tobacco: Never Used   Substance Use Topics    Alcohol use: No     Alcohol/week: 0.0 standard drinks      Allergies   Allergen Reactions    Metoprolol      Nausea  \"felt bad\"    Mirtazapine     Zoloft [Sertraline Hcl]      Shaking and more anxiety    Sertraline Anxiety     Shaking and more anxiety     Current Outpatient Medications on File Prior to Visit   Medication Sig Dispense Refill    aspirin 81 MG EC tablet Take 81 mg by mouth daily      levothyroxine (SYNTHROID) 50 MCG tablet Take 1 tablet by mouth daily 90 tablet 3    vitamin B-12 (CYANOCOBALAMIN) 1000 MCG tablet Take 2,000 mcg by mouth daily      Multiple Vitamins-Minerals (THERAPEUTIC MULTIVITAMIN-MINERALS) tablet Take 1 tablet by mouth every other day       No current facility-administered medications on file prior to visit. Review of Systems   Constitutional: Positive for fatigue. Negative for activity change and diaphoresis. HENT: Negative for congestion, postnasal drip and rhinorrhea. Respiratory: Positive for shortness of breath ( with exertion). hyperlipidemia  -     Comprehensive Metabolic Panel, Fasting; Future  -     CBC Auto Differential; Future  -     Lipid, Fasting; Future  -     TSH with Reflex; Future        Return in about 3 months (around 6/4/2021). Please note portions of this note were completed with a voicerecognition program.  Efforts were made to edit the dictations but occasionally words are mis-transcribed.

## 2021-03-04 NOTE — PATIENT INSTRUCTIONS
To Schedule your COVID-19 vaccine through [facility] call the number below:    [phone]  Option #2      [address] Vaccination Sites     West: [address], [address]  Mon-Fri 1-4pm     Riki: Verlin Simmonds FLU/RED CLIN/ COVID-19 VACCINE SCHEDULE I [address] [facility]. [facility] [id number]  Mon-Fri 1-4pm     Shaggy: [facility] AND FLU/RED CLIN/ COVID-19 VACCINE SCHEDULE I [address] [facility] [address] [address].   Mon-Fri 12:30-4:30pm     [facility] at [facility]: [address] [facility]/ [address] [place] [address] [address] [facility], [place] [address].  Mon-Fri 10am-6pm

## 2021-03-29 DIAGNOSIS — E03.9 ACQUIRED HYPOTHYROIDISM: ICD-10-CM

## 2021-03-30 ENCOUNTER — IMMUNIZATION (OUTPATIENT)
Dept: FAMILY MEDICINE CLINIC | Age: 86
End: 2021-03-30
Payer: MEDICARE

## 2021-03-30 PROCEDURE — 0001A COVID-19, PFIZER VACCINE 30MCG/0.3ML DOSE: CPT | Performed by: FAMILY MEDICINE

## 2021-03-30 PROCEDURE — 91300 COVID-19, PFIZER VACCINE 30MCG/0.3ML DOSE: CPT | Performed by: FAMILY MEDICINE

## 2021-03-30 RX ORDER — LEVOTHYROXINE SODIUM 0.05 MG/1
50 TABLET ORAL DAILY
Qty: 90 TABLET | Refills: 3 | Status: SHIPPED | OUTPATIENT
Start: 2021-03-30 | End: 2022-06-01

## 2021-04-08 NOTE — PROGRESS NOTES
[address]   Electrophysiology Consultation   Date: 4/9/2021  Reason for Consultation: Beto Drake Requesting Physician: Afib    CC: Afib  HPI: Willie Gifford is a 80 y.o. female with a PMH of paroxysmal atrial fibrillation, HTN, and anxiety    Originally diagnosed with atrial fibrillation 11/2011. Presented to the ED 10/2012 with complaints of palpitations, ECG showed sinus arrhythmia. She then wore a holter monitored which revealed short episodes of afib. She was initially on Xarelto but was discontinued d/t ongoing anemia     MCOT 2014 showed PAC's and brief PAT. [facility] [address] [facility] 2015 showed atrial fibrillation and was recommended to start amiodarone   MCOT 6/4/2019 showed atrial fibrillation 68% burden and was symptomatic    4/2020 Patient stopped Eliquis. Last OV with  7/8/2020 stated majority of ECG's were sinus rhythm with frequent PAC's    OV with  3/4/2021 she requested to see a new cardiologist for a second opinion. Tahmina Kirk presents to the office as a new patient. She states she is very nervous all the time. She also has complaints of weakness and ongoing fatigue. We discussed need for [facility] since she is high risk for thromboembolism. Highly recommend that she try taking Eliquis 2.5 mg BID. Assessment and plan:   Atrial fibrillation,Paroxysmal     -ECG today shows Sinus rhythm with PAT.    -MCOT 6/4/2019 showed atrial fibrillation 68% burden and was symptomatic      [id number] Score: 4   High [id number] score and high risk for stroke and thromboembolism. Anticoagulation is recommended. Discussed other options including starting low-dose anticoagulation versus continuing with aspirin. Risk-benefit alternative discussed. I answered all her questions. She is very nervous. To reduce the risk of stroke anticoagulation is recommended and she is afraid of having blood clot and stroke.     -Advised she stop Asa and start Eliquis 2.5 mg BID, samples given -We will do a 2 week holter if she can tolerate    -Initially on Xarelto but was discontinued d/t ongoing anemia       HTN  -Not well controlled 173/96 2nd time 165/82  -She is nervous today  -BP goal <130/80  -Home BP monitoring encouraged, printed information provided on how to accurately measure BP at home.  -Counseled to follow a low salt diet to assure blood pressure remains controlled for cardiovascular risk factor modification.   -The patient is counseled to get regular exercise 3-5 times per week and maintain a healthy weight reduce cardiovascular risk factors. Anxiety: follow up with PCP. Hypothyroidism: On levothyroxine. Patient Active Problem List    Diagnosis Date Noted    Closed fracture of left hip ([facility] 12/23/2019    Psychophysiological insomnia 07/27/2018    Hyperglycemia 07/27/2018    Chronic fatigue 06/20/2018    Contusion of left side of back 03/19/2018    Weight loss 03/01/2017    Disequilibrium 11/14/2016    Bilateral sensorineural hearing loss 11/14/2016    Soreness of tongue 09/26/2016    Iron deficiency anemia due to chronic blood loss 07/08/2016    Acquired hypothyroidism 04/08/2016    Constipation due to outlet dysfunction 01/18/2016    Hearing loss 07/14/2015    Ataxia 04/17/2015    Peripheral neuropathy 04/17/2015    OA (osteoarthritis) of knee 12/02/2014    Weakness of left foot 12/02/2014    Imbalance 05/13/2014    Glaucoma 11/04/2012    Migraine 11/04/2012    Anxiety state 10/02/2012    Atrial fibrillation ([facility] 12/06/2011    Palpitations 06/02/2011    Fatigue 06/02/2011    Hypertension      Diagnostic studies:   ECG 4/9/21  Sinus rhythm     Echo 2/29/2016  Summary   -Normal left ventricle size, wall thickness and systolic function with an   estimated ejection fraction of 55%.    -Mild to moderate mitral regurgitation is present.   -Trivial aortic regurgitation is present.   -There is mild tricuspid regurgitation with RVSP estimated at 42 mmHg.       I independently reviewed the cardiac diagnostic studies, ECG and relevant imaging studies. No results found for: LVEF, LVEFMODE  Lab Results   Component Value Date    TSH 1.52 09/24/2020       Physical Examination:  Vitals:    04/09/21 0913   BP: (!) 165/82   Pulse: 80   Resp:       Wt Readings from Last 3 Encounters:   04/09/21 140 lb (63.5 kg)   03/04/21 141 lb (64 kg)   01/22/21 140 lb (63.5 kg)       · Constitutional: Oriented. No distress. · Head: Normocephalic and atraumatic. · Mouth/Throat: Oropharynx is clear and moist.   · Eyes: Conjunctivae normal. EOM are normal.   · Neck: Neck supple. No JVD present. · Cardiovascular: Normal rate, Irregular rhythm, S1&S2. · Pulmonary/Chest: Bilateral respiratory sounds. No rhonchi. · Abdominal: Soft. No tenderness. · Musculoskeletal: No tenderness. No edema    · Lymphadenopathy: Has no cervical adenopathy. · Neurological: Alert and oriented. Follows command, No Gross deficit   · Skin: Skin is warm, No rash noted. · Psychiatric: Has a normal behavior     Review of System:  [x] Full ROS obtained and negative except as mentioned in HPI    Prior to Admission medications    Medication Sig Start Date End Date Taking?  Authorizing Provider   LORazepam (ATIVAN) 0.5 MG tablet TAKE 1 TABLET BY MOUTH EVERY 8 HOURS AS NEEDED FOR ANXIETY 4/3/21  Yes Historical Provider, MD   apixaban (ELIQUIS) 2.5 MG TABS tablet Take 1 tablet by mouth 2 times daily 4/9/21  Yes Nela Velasquez MD   levothyroxine (SYNTHROID) 50 MCG tablet Take 1 tablet by mouth daily 3/30/21  Yes Haily Veliz MD   vitamin B-12 (CYANOCOBALAMIN) 1000 MCG tablet Take 2,000 mcg by mouth daily   Yes Historical Provider, MD   Multiple Vitamins-Minerals (THERAPEUTIC MULTIVITAMIN-MINERALS) tablet Take 1 tablet by mouth every other day   Yes Historical Provider, MD       Past Medical History:   Diagnosis Date    Anxiety     Atrial fibrillation ([facility]     Disorder of bone and cartilage, unspecified     Fatigue     Hypertension     Migraine, unspecified, without mention of intractable migraine without mention of status migrainosus     Other disorders of kidney and ureter     Peripheral neuropathy 4/17/2015    Psychiatric problem     Risk for falls 5/20/14    Spinal stenosis     Thyroid disease     Unspecified glaucoma(365.9)     Unspecified hearing loss         Past Surgical History:   Procedure Laterality Date    APPENDECTOMY      CATARACT REMOVAL Bilateral 04/2017    COLON SURGERY      COLONOSCOPY      EYE SURGERY      HIP SURGERY Left 12/24/2019    LEFT HIP HEMIARTHROPLASTY performed by Abraham Vines MD at [address] [facility]         Allergies   Allergen Reactions    Metoprolol      Nausea  \"felt bad\"    Mirtazapine     Zoloft [Sertraline Hcl]      Shaking and more anxiety    Sertraline Anxiety     Shaking and more anxiety       Social History:  Reviewed. reports that she has never smoked. She has never used smokeless tobacco. She reports that she does not drink alcohol or use drugs. Family History:  Reviewed. Reviewed. No family history of SCD. Relevant and available labs, and cardiovascular diagnostics reviewed. Reviewed. I independently reviewed all cardiac tracing. I independently reviewed relevant and available cardiac diagnostic tests ECG, CXR, Echo, Stress test, Device interrogation, Holter, CT scan. All questions and concerns were addressed to the patient/family. Alternatives to my treatment were discussed. I have discussed the above stated plan and the patient verbalized understanding and agreed with the plan. Scribe attestation: This note was scribed in the presence of See Ribera MD by Kenney Villaseñor RN    Physician Attestation: I, Dr. See Ribera, confirm that the scribe's documentation has been prepared under my direction and personally reviewed by me in its entirety.   I also confirm that the

## 2021-04-09 ENCOUNTER — OFFICE VISIT (OUTPATIENT)
Dept: CARDIOLOGY CLINIC | Age: 86
End: 2021-04-09
Payer: MEDICARE

## 2021-04-09 VITALS
HEIGHT: 66 IN | BODY MASS INDEX: 22.5 KG/M2 | DIASTOLIC BLOOD PRESSURE: 82 MMHG | SYSTOLIC BLOOD PRESSURE: 165 MMHG | HEART RATE: 80 BPM | RESPIRATION RATE: 20 BRPM | WEIGHT: 140 LBS

## 2021-04-09 DIAGNOSIS — I48.0 PAROXYSMAL ATRIAL FIBRILLATION (HCC): Primary | ICD-10-CM

## 2021-04-09 PROCEDURE — 1090F PRES/ABSN URINE INCON ASSESS: CPT | Performed by: INTERNAL MEDICINE

## 2021-04-09 PROCEDURE — 1036F TOBACCO NON-USER: CPT | Performed by: INTERNAL MEDICINE

## 2021-04-09 PROCEDURE — 93246 EXT ECG>7D<15D RECORDING: CPT | Performed by: INTERNAL MEDICINE

## 2021-04-09 PROCEDURE — 4040F PNEUMOC VAC/ADMIN/RCVD: CPT | Performed by: INTERNAL MEDICINE

## 2021-04-09 PROCEDURE — 93248 EXT ECG>7D<15D REV&INTERPJ: CPT | Performed by: INTERNAL MEDICINE

## 2021-04-09 PROCEDURE — G8427 DOCREV CUR MEDS BY ELIG CLIN: HCPCS | Performed by: INTERNAL MEDICINE

## 2021-04-09 PROCEDURE — 93000 ELECTROCARDIOGRAM COMPLETE: CPT | Performed by: INTERNAL MEDICINE

## 2021-04-09 PROCEDURE — G8420 CALC BMI NORM PARAMETERS: HCPCS | Performed by: INTERNAL MEDICINE

## 2021-04-09 PROCEDURE — 99204 OFFICE O/P NEW MOD 45 MIN: CPT | Performed by: INTERNAL MEDICINE

## 2021-04-09 PROCEDURE — 1123F ACP DISCUSS/DSCN MKR DOCD: CPT | Performed by: INTERNAL MEDICINE

## 2021-04-09 RX ORDER — LORAZEPAM 0.5 MG/1
TABLET ORAL
COMMUNITY
Start: 2021-04-03 | End: 2021-05-24

## 2021-04-20 ENCOUNTER — IMMUNIZATION (OUTPATIENT)
Dept: FAMILY MEDICINE CLINIC | Age: 86
End: 2021-04-20
Payer: MEDICARE

## 2021-04-20 PROCEDURE — 0002A COVID-19, PFIZER VACCINE 30MCG/0.3ML DOSE: CPT | Performed by: FAMILY MEDICINE

## 2021-04-20 PROCEDURE — 91300 COVID-19, PFIZER VACCINE 30MCG/0.3ML DOSE: CPT | Performed by: FAMILY MEDICINE

## 2021-04-27 ENCOUNTER — TELEPHONE (OUTPATIENT)
Dept: CARDIOLOGY CLINIC | Age: 86
End: 2021-04-27

## 2021-04-27 DIAGNOSIS — R53.83 FATIGUE, UNSPECIFIED TYPE: Primary | ICD-10-CM

## 2021-04-27 DIAGNOSIS — Z79.01 ON APIXABAN THERAPY: ICD-10-CM

## 2021-04-27 NOTE — TELEPHONE ENCOUNTER
Needs to [facility] with Eliquis. ASA is not strong enough to protect from stroke. Is she is feeling poorly or week we should check blood counts, ordered CBC.      LEILANI Harrington-CNP

## 2021-04-27 NOTE — TELEPHONE ENCOUNTER
Spoke to the pt-stated she has not felt the same since she had COVID. The Eliquis seems to have made it worse. She has not taken it today. Asking if she can go back on the Aspirin.

## 2021-04-27 NOTE — TELEPHONE ENCOUNTER
Pt calling she started taking Eliquis  2 weeks ago and has not been feeling well since she stared taking it, she feels very weak and tired pls call to advise thank you

## 2021-04-27 NOTE — TELEPHONE ENCOUNTER
Spoke to the pt-gave instructions per Juaquin Garcia CNP. She will have the blood work done 4828/21.

## 2021-04-28 ENCOUNTER — HOSPITAL ENCOUNTER (OUTPATIENT)
Age: 86
Discharge: HOME | End: 2021-04-28
Payer: MEDICARE

## 2021-04-28 DIAGNOSIS — R73.9 HYPERGLYCEMIA: ICD-10-CM

## 2021-04-28 DIAGNOSIS — E78.2 MIXED HYPERLIPIDEMIA: ICD-10-CM

## 2021-04-28 DIAGNOSIS — I10 ESSENTIAL HYPERTENSION: Chronic | ICD-10-CM

## 2021-04-28 LAB
A/G RATIO: 1.3 (ref 1.1–2.2)
ALBUMIN SERPL-MCNC: 4.1 G/DL (ref 3.4–5)
ALP BLD-CCNC: 107 U/L (ref 40–129)
ALT SERPL-CCNC: 12 U/L (ref 10–40)
ANION GAP SERPL CALCULATED.3IONS-SCNC: 10 MMOL/L (ref 3–16)
AST SERPL-CCNC: 19 U/L (ref 15–37)
BASOPHILS ABSOLUTE: 0 K/UL (ref 0–0.2)
BASOPHILS RELATIVE PERCENT: 0.8 %
BILIRUB SERPL-MCNC: 0.5 MG/DL (ref 0–1)
BUN BLDV-MCNC: 19 MG/DL (ref 7–20)
CALCIUM SERPL-MCNC: 9.1 MG/DL (ref 8.3–10.6)
CHLORIDE BLD-SCNC: 105 MMOL/L (ref 99–110)
CHOLESTEROL, FASTING: 123 MG/DL (ref 0–199)
CO2: 24 MMOL/L (ref 21–32)
CREAT SERPL-MCNC: 1.1 MG/DL (ref 0.6–1.2)
EOSINOPHILS ABSOLUTE: 0 K/UL (ref 0–0.6)
EOSINOPHILS RELATIVE PERCENT: 1 %
GFR AFRICAN AMERICAN: 56
GFR NON-AFRICAN AMERICAN: 46
GLOBULIN: 3.1 G/DL
GLUCOSE FASTING: 84 MG/DL (ref 70–99)
HCT VFR BLD CALC: 35 % (ref 36–48)
HDLC SERPL-MCNC: 50 MG/DL (ref 40–60)
HEMOGLOBIN: 12.2 G/DL (ref 12–16)
LDL CHOLESTEROL CALCULATED: 41 MG/DL
LYMPHOCYTES ABSOLUTE: 1.3 K/UL (ref 1–5.1)
LYMPHOCYTES RELATIVE PERCENT: 35 %
MCH RBC QN AUTO: 32.4 PG (ref 26–34)
MCHC RBC AUTO-ENTMCNC: 34.9 G/DL (ref 31–36)
MCV RBC AUTO: 93 FL (ref 80–100)
MONOCYTES ABSOLUTE: 0.4 K/UL (ref 0–1.3)
MONOCYTES RELATIVE PERCENT: 9.8 %
NEUTROPHILS ABSOLUTE: 1.9 K/UL (ref 1.7–7.7)
NEUTROPHILS RELATIVE PERCENT: 53.4 %
PDW BLD-RTO: 13.3 % (ref 12.4–15.4)
PLATELET # BLD: 174 K/UL (ref 135–450)
PMV BLD AUTO: 8.3 FL (ref 5–10.5)
POTASSIUM SERPL-SCNC: 4.4 MMOL/L (ref 3.5–5.1)
RBC # BLD: 3.76 M/UL (ref 4–5.2)
SODIUM BLD-SCNC: 139 MMOL/L (ref 136–145)
TOTAL PROTEIN: 7.2 G/DL (ref 6.4–8.2)
TRIGLYCERIDE, FASTING: 161 MG/DL (ref 0–150)
TSH REFLEX: 0.95 UIU/ML (ref 0.27–4.2)
VLDLC SERPL CALC-MCNC: 32 MG/DL
WBC # BLD: 3.6 K/UL (ref 4–11)

## 2021-04-28 PROCEDURE — 80053 COMPREHEN METABOLIC PANEL: CPT

## 2021-04-28 PROCEDURE — 83036 HEMOGLOBIN GLYCOSYLATED A1C: CPT

## 2021-04-28 PROCEDURE — 36415 COLL VENOUS BLD VENIPUNCTURE: CPT

## 2021-04-28 PROCEDURE — 84443 ASSAY THYROID STIM HORMONE: CPT

## 2021-04-28 PROCEDURE — 85025 COMPLETE CBC W/AUTO DIFF WBC: CPT

## 2021-04-28 PROCEDURE — 80061 LIPID PANEL: CPT

## 2021-04-29 ENCOUNTER — TELEPHONE (OUTPATIENT)
Dept: CARDIOLOGY CLINIC | Age: 86
End: 2021-04-29

## 2021-04-29 LAB
ESTIMATED AVERAGE GLUCOSE: 96.8 MG/DL
HBA1C MFR BLD: 5 %

## 2021-04-29 NOTE — TELEPHONE ENCOUNTER
Pt had CBC done yesterday as part of Dr. Erick Devries orders. PER NPAL:   Is she is feeling poorly or week we should check blood counts, ordered CBC.

## 2021-04-29 NOTE — TELEPHONE ENCOUNTER
Pt calling she had her labs drawn yesterday from Dr Karen Hernandez and she wants to know if NPAL can use those results?  pls call to advise thank you

## 2021-04-29 NOTE — TELEPHONE ENCOUNTER
Spoke with the patient and advised her of the message below per NPAL . Patient voiced understanding .  Call complete

## 2021-04-30 ENCOUNTER — NURSE TRIAGE (OUTPATIENT)
Dept: OTHER | Facility: CLINIC | Age: 86
End: 2021-04-30

## 2021-04-30 ENCOUNTER — TELEPHONE (OUTPATIENT)
Dept: FAMILY MEDICINE CLINIC | Age: 86
End: 2021-04-30

## 2021-04-30 NOTE — TELEPHONE ENCOUNTER
Received call from Aiden Dumas at [facility]) Sondra with Red Flag Complaint. Brief description of triage: Fatigue, weakness    Triage indicates for patient to be seen today. Patient stated that if there are no appts that she will not go to an [facility] or the ED. Patient stated, \"I want to see Dr. Michael England. Can't you just make me an appointment? \"    Care advice provided, patient verbalizes understanding; denies any other questions or concerns; instructed to call back for any new or worsening symptoms. Writer provided warm transfer to Tan at [facility] for appointment scheduling. This writer let Tan know that this RN is comfortable with patient waiting until Monday to see PCP since she refused [facility]/ED suggestion. Attention Provider: Thank you for allowing me to participate in the care of your patient. The patient was connected to triage in response to information provided to the [facility]. Please do not respond through this encounter as the response is not directed to a shared pool. Reason for Disposition   MODERATE weakness (i.e., interferes with work, school, normal activities) and persists > 3 days    Answer Assessment - Initial Assessment Questions  1. DESCRIPTION: \"Describe how you are feeling. \"      Feels run down. \"I just don't have any energy. \"    2. SEVERITY: \"How bad is it? \"  \"Can you stand and walk? \"    - MILD - Feels weak or tired, but does not interfere with work, school or normal activities    - [facility] to stand and walk; weakness interferes with work, school, or normal activities    - SEVERE - Unable to stand or walk      Moderate to severe. 3. ONSET:  \"When did the weakness begin? \"      \"It started a while ago. Like months. \"    4. CAUSE: \"What do you think is causing the weakness? \"      \"Unsure. \"    5. MEDICINES: Zion Gonzalez you recently started a new medicine or had a change in the amount of a medicine? \"      No.     6. OTHER SYMPTOMS: \"Do you have any other symptoms? \" (e.g., chest pain, fever, cough, SOB, vomiting, diarrhea, bleeding, other areas of pain)      No.     7. PREGNANCY: \"Is there any chance you are pregnant? \" \"When was your last menstrual period? \"      N/A    Protocols used: WEAKNESS (GENERALIZED) AND FATIGUE-ADULT-[place]

## 2021-04-30 NOTE — TELEPHONE ENCOUNTER
LVM to schedule appt - per Dr Tatyana Pelaez, patient can be scheduled in any available same day slot or need to go to ED if she cannot wait to be seen.

## 2021-05-04 ENCOUNTER — OFFICE VISIT (OUTPATIENT)
Dept: FAMILY MEDICINE CLINIC | Age: 86
End: 2021-05-04
Payer: MEDICARE

## 2021-05-04 VITALS
SYSTOLIC BLOOD PRESSURE: 146 MMHG | DIASTOLIC BLOOD PRESSURE: 70 MMHG | BODY MASS INDEX: 22.76 KG/M2 | HEART RATE: 75 BPM | HEIGHT: 66 IN | WEIGHT: 141.6 LBS | TEMPERATURE: 97.3 F

## 2021-05-04 DIAGNOSIS — R53.82 CHRONIC FATIGUE: Chronic | ICD-10-CM

## 2021-05-04 DIAGNOSIS — I48.0 PAROXYSMAL ATRIAL FIBRILLATION (HCC): Chronic | ICD-10-CM

## 2021-05-04 DIAGNOSIS — E78.2 MIXED HYPERLIPIDEMIA: Primary | ICD-10-CM

## 2021-05-04 DIAGNOSIS — G62.9 PERIPHERAL POLYNEUROPATHY: ICD-10-CM

## 2021-05-04 PROCEDURE — 1090F PRES/ABSN URINE INCON ASSESS: CPT | Performed by: FAMILY MEDICINE

## 2021-05-04 PROCEDURE — G8427 DOCREV CUR MEDS BY ELIG CLIN: HCPCS | Performed by: FAMILY MEDICINE

## 2021-05-04 PROCEDURE — 1036F TOBACCO NON-USER: CPT | Performed by: FAMILY MEDICINE

## 2021-05-04 PROCEDURE — 99214 OFFICE O/P EST MOD 30 MIN: CPT | Performed by: FAMILY MEDICINE

## 2021-05-04 PROCEDURE — G8420 CALC BMI NORM PARAMETERS: HCPCS | Performed by: FAMILY MEDICINE

## 2021-05-04 PROCEDURE — 1123F ACP DISCUSS/DSCN MKR DOCD: CPT | Performed by: FAMILY MEDICINE

## 2021-05-04 PROCEDURE — 4040F PNEUMOC VAC/ADMIN/RCVD: CPT | Performed by: FAMILY MEDICINE

## 2021-05-04 RX ORDER — GABAPENTIN 100 MG/1
100 CAPSULE ORAL NIGHTLY
Qty: 30 CAPSULE | Refills: 5 | Status: SHIPPED | OUTPATIENT
Start: 2021-05-04 | End: 2021-10-31

## 2021-05-04 SDOH — ECONOMIC STABILITY: FOOD INSECURITY: WITHIN THE PAST 12 MONTHS, THE FOOD YOU BOUGHT JUST DIDN'T LAST AND YOU DIDN'T HAVE MONEY TO GET MORE.: NEVER TRUE

## 2021-05-04 SDOH — ECONOMIC STABILITY: INCOME INSECURITY: HOW HARD IS IT FOR YOU TO PAY FOR THE VERY BASICS LIKE FOOD, HOUSING, MEDICAL CARE, AND HEATING?: NOT HARD AT ALL

## 2021-05-04 SDOH — ECONOMIC STABILITY: TRANSPORTATION INSECURITY
IN THE PAST 12 MONTHS, HAS LACK OF TRANSPORTATION KEPT YOU FROM MEETINGS, WORK, OR FROM GETTING THINGS NEEDED FOR DAILY LIVING?: NO

## 2021-05-04 SDOH — ECONOMIC STABILITY: FOOD INSECURITY: WITHIN THE PAST 12 MONTHS, YOU WORRIED THAT YOUR FOOD WOULD RUN OUT BEFORE YOU GOT MONEY TO BUY MORE.: NEVER TRUE

## 2021-05-04 ASSESSMENT — PATIENT HEALTH QUESTIONNAIRE - PHQ9
2. FEELING DOWN, DEPRESSED OR HOPELESS: 3
1. LITTLE INTEREST OR PLEASURE IN DOING THINGS: 3
5. POOR APPETITE OR OVEREATING: 0
10. IF YOU CHECKED OFF ANY PROBLEMS, HOW DIFFICULT HAVE THESE PROBLEMS MADE IT FOR YOU TO DO YOUR WORK, TAKE CARE OF THINGS AT HOME, OR GET ALONG WITH OTHER PEOPLE: 1
SUM OF ALL RESPONSES TO PHQ QUESTIONS 1-9: 14
9. THOUGHTS THAT YOU WOULD BE BETTER OFF DEAD, OR OF HURTING YOURSELF: 0
SUM OF ALL RESPONSES TO PHQ QUESTIONS 1-9: 14

## 2021-05-04 ASSESSMENT — ENCOUNTER SYMPTOMS
EYE ITCHING: 0
ABDOMINAL PAIN: 1
RHINORRHEA: 0
WHEEZING: 0
CHEST TIGHTNESS: 0
CONSTIPATION: 1
SHORTNESS OF BREATH: 1
BLOATING: 1

## 2021-05-04 ASSESSMENT — COLUMBIA-SUICIDE SEVERITY RATING SCALE - C-SSRS: 2. HAVE YOU ACTUALLY HAD ANY THOUGHTS OF KILLING YOURSELF?: NO

## 2021-05-04 NOTE — PROGRESS NOTES
SUBJECTIVE:    Walter Mccracken is a 80 y.o. female who presents for a follow up visit. Chief Complaint   Patient presents with    Fatigue        Fatigue  This is a chronic problem. The current episode started more than 1 year ago. Associated symptoms include abdominal pain and fatigue. Pertinent negatives include no chest pain or congestion. Constipation  This is a chronic problem. The current episode started more than 1 year ago. The problem has been waxing and waning since onset. The stool is described as firm. The patient is on a high fiber diet. She does not exercise regularly. There has not been adequate water intake. Associated symptoms include abdominal pain and bloating. Pertinent negatives include no difficulty urinating. Risk factors include immobility. She has tried laxatives and fiber for the symptoms. The treatment provided mild relief. Atrial Fibrillation  This is a chronic problem. He was diagnosed in November 2011 with atrial fibrillation. At one point she was on Xarelto but this was discontinued due to anemia. 2015 she had a Holter that revealed atrial fibrillation and was recommended to start amiodarone I am not sure if she stayed on it for any length of time. She was placed on Eliquis but stopped this on her own. This was restarted after she saw Dr. Martin Romero. She is already decreased from twice daily Eliquis to once daily and I insisted she go back to twice daily. I think she now understands that she is at risk for stroke if she does not take her Eliquis. Patient's medications, allergies, past medical,surgical, social and family histories were reviewed and updated as appropriate.      Past Medical History:   Diagnosis Date    Anxiety     Atrial fibrillation (HCC)     Disorder of bone and cartilage, unspecified     Fatigue     Hypertension     Migraine, unspecified, without mention of intractable migraine without mention of status migrainosus     Other disorders of kidney and ureter     Peripheral neuropathy 4/17/2015    Psychiatric problem     Risk for falls 5/20/14    Spinal stenosis     Thyroid disease     Unspecified glaucoma(365.9)     Unspecified hearing loss      Past Surgical History:   Procedure Laterality Date    APPENDECTOMY      CATARACT REMOVAL Bilateral 04/2017    COLON SURGERY      COLONOSCOPY      EYE SURGERY      HIP SURGERY Left 12/24/2019    LEFT HIP HEMIARTHROPLASTY performed by Rebecca Donato MD at [address]       Family History   Problem Relation Age of Onset    Heart Disease Mother     High Cholesterol Mother     High Blood Pressure Mother     Stroke Father     Heart Disease Father     Heart Disease Paternal Aunt      Social History     Tobacco Use    Smoking status: Never Smoker    Smokeless tobacco: Never Used   Substance Use Topics    Alcohol use: No     Alcohol/week: 0.0 standard drinks      Allergies   Allergen Reactions    Metoprolol      Nausea  \"felt bad\"    Mirtazapine     Zoloft [Sertraline Hcl]      Shaking and more anxiety    Sertraline Anxiety     Shaking and more anxiety     Current Outpatient Medications on File Prior to Visit   Medication Sig Dispense Refill    LORazepam (ATIVAN) 0.5 MG tablet TAKE 1 TABLET BY MOUTH EVERY 8 HOURS AS NEEDED FOR ANXIETY      apixaban (ELIQUIS) 2.5 MG TABS tablet Take 1 tablet by mouth 2 times daily 60 tablet 3    levothyroxine (SYNTHROID) 50 MCG tablet Take 1 tablet by mouth daily 90 tablet 3    vitamin B-12 (CYANOCOBALAMIN) 1000 MCG tablet Take 2,000 mcg by mouth daily      Multiple Vitamins-Minerals (THERAPEUTIC MULTIVITAMIN-MINERALS) tablet Take 1 tablet by mouth every other day       No current facility-administered medications on file prior to visit. Review of Systems   Constitutional: Positive for fatigue. HENT: Negative for congestion, postnasal drip and rhinorrhea. Eyes: Negative for itching.    Respiratory: Positive for shortness of breath ( DOLL). Negative for chest tightness and wheezing. Cardiovascular: Positive for palpitations. Negative for chest pain. Gastrointestinal: Positive for abdominal pain, bloating and constipation. Genitourinary: Negative for difficulty urinating. Neurological: Negative for dizziness and light-headedness. Psychiatric/Behavioral: The patient is nervous/anxious. OBJECTIVE:    BP (!) 146/70   Pulse 75   Temp 97.3 °F (36.3 °C) (Temporal)   Ht 5' 6\" (1.676 m)   Wt 141 lb 9.6 oz (64.2 kg)   BMI 22.85 kg/m²    Physical Exam  Constitutional:       Appearance: She is well-developed. HENT:      Head: Normocephalic and atraumatic. Right Ear: External ear normal.      Left Ear: External ear normal.      Nose: Nose normal.   Eyes:      General:         Right eye: No discharge. Conjunctiva/sclera: Conjunctivae normal.   Neck:      Musculoskeletal: Normal range of motion and neck supple. Thyroid: No thyromegaly. Vascular: No JVD. Trachea: No tracheal deviation. Cardiovascular:      Rate and Rhythm: Normal rate and regular rhythm. Heart sounds: Normal heart sounds. Pulmonary:      Effort: Pulmonary effort is normal. No respiratory distress. Breath sounds: Normal breath sounds. No rales. Lymphadenopathy:      Cervical: No cervical adenopathy. Skin:     General: Skin is warm and dry. Neurological:      Mental Status: She is alert and oriented to person, place, and time. ASSESSMENT/PLAN:    Keara Matamoros was seen today for fatigue. Diagnoses and all orders for this visit:    Mixed hyperlipidemia  -     Comprehensive Metabolic Panel, Fasting; Future  -     CBC Auto Differential; Future  -     Lipid, Fasting; Future    Chronic fatigue  Baseline labs were all normal.  Patient did have Covid in January of this year and may still be suffering from the fatigue of Covid.   I did offer to send her for physical therapy but she declines at this time.    Paroxysmal atrial fibrillation ([facility] [address]  She is encouraged to follow-up with cardiology as planned. Peripheral polyneuropathy  -     gabapentin (NEURONTIN) 100 MG capsule; Take 1 capsule by mouth nightly for 180 days. Intended supply: 30 days        Return in about 6 months (around 11/4/2021). Please note portions of this note were completed with a voicerecognition program.  Efforts were made to edit the dictations but occasionally words are mis-transcribed.

## 2021-05-04 NOTE — PATIENT INSTRUCTIONS
Patient Education        Constipation: Care Instructions  Your Care Instructions     Constipation means that you have a hard time passing stools (bowel movements). People pass stools from 3 times a day to once every 3 days. What is normal for you may be different. Constipation may occur with pain in the rectum and cramping. The pain may get worse when you try to pass stools. Sometimes there are small amounts of bright red blood on toilet paper or the surface of stools. This is because of enlarged veins near the rectum (hemorrhoids). A few changes in your diet and lifestyle may help you avoid ongoing constipation. Your doctor may also prescribe medicine to help loosen your stool. Some medicines can cause constipation. These include pain medicines and antidepressants. Tell your doctor about all the medicines you take. Your doctor may want to make a medicine change to ease your symptoms. Follow-up care is a key part of your treatment and safety. Be sure to make and go to all appointments, and call your doctor if you are having problems. It's also a good idea to know your test results and keep a list of the medicines you take. How can you care for yourself at home? · Drink plenty of fluids. If you have kidney, heart, or liver disease and have to limit fluids, talk with your doctor before you increase the amount of fluids you drink. · Include high-fiber foods in your diet each day. These include fruits, vegetables, beans, and whole grains. · Get at least 30 minutes of exercise on most days of the week. Walking is a good choice. You also may want to do other activities, such as running, swimming, cycling, or playing tennis or team sports. · Take a fiber supplement, such as Citrucel or Metamucil, every day. Read and follow all instructions on the label. · Schedule time each day for a bowel movement. A daily routine may help. Take your time having your bowel movement.   · Support your feet with a small step stool take care to avoid injury. How is it diagnosed? To diagnose peripheral neuropathy, your doctor will ask you about:  · Your symptoms. · Your medical history. This may include your use of alcohol, risk of HIV infection, or exposure to toxic substances. · Your family's medical history, including nerve disease. Your doctor may test how well you can feel touch, temperature, and pain. You may also have blood tests. These tests will help the doctor find out if you have conditions that can cause neuropathy. Examples are diabetes, vitamin deficiencies, thyroid disease, and kidney problems. How is it treated? Treatment for peripheral neuropathy can relieve symptoms. This is done by treating the health problem that's causing it. For example, if you have diabetes, keeping your blood sugar within your target range may help. Or maybe your body lacks certain vitamins caused by drinking too much alcohol. In that case, treatment may include eating a healthy diet, taking vitamins, and stopping alcohol use. You may have physical therapy. This can increase muscle strength and help build muscle control. Over-the-counter medicine can relieve mild nerve pain. Your doctor may also prescribe medicine to help with severe pain, numbness, tingling, and weakness. If you have neuropathy in your feet, it's a good idea to have them checked during each office visit. This can help prevent problems. Some people find that physical therapy, acupuncture, or transcutaneous electrical nerve stimulation (TENS) helps relieve pain. Follow-up care is a key part of your treatment and safety. Be sure to make and go to all appointments, and call your doctor if you are having problems. It's also a good idea to know your test results and keep a list of the medicines you take. Where can you learn more? Go to https://rainer.[employer]. org and sign in to your [employer] account.  Enter P130 in the [place] box to learn more about

## 2021-05-07 ENCOUNTER — TELEPHONE (OUTPATIENT)
Dept: CARDIOLOGY CLINIC | Age: 86
End: 2021-05-07

## 2021-05-07 NOTE — TELEPHONE ENCOUNTER
Call placed to [facility] regarding her holter monitor. She does not want to be on Eliquis. Encouraged her to take her to continue her Eliquis though she is somewhat resistant to continuing it and admitted to only taking once a day sometimes. I explained to her the efficacy of taking as prescribed two times a day.  She VU She ultimately wants to stop eliquis and resume Asprin and I stressed the increased chances of having a stroke,

## 2021-05-22 DIAGNOSIS — F41.9 ANXIETY: Primary | ICD-10-CM

## 2021-05-24 NOTE — TELEPHONE ENCOUNTER
Medication:   Requested Prescriptions     Pending Prescriptions Disp Refills    LORazepam (ATIVAN) 0.5 MG tablet [Pharmacy Med Name: LORAZEPAM 0.5MG TABLETS] 90 tablet 0     Sig: TAKE 1 TABLET BY MOUTH EVERY 8 HOURS AS NEEDED FOR ANXIETY        Last Filled: 4/3/2021      Patient Phone Number: [phone] (home)     Last appt: 5/4/2021   Next appt: 11/4/2021    Last OARRS:   RX Monitoring 6/13/2019   Attestation -   Periodic Controlled Substance Monitoring No signs of potential drug abuse or diversion identified.

## 2021-05-25 RX ORDER — LORAZEPAM 0.5 MG/1
TABLET ORAL
Qty: 90 TABLET | Refills: 0 | Status: SHIPPED | OUTPATIENT
Start: 2021-05-25 | End: 2021-09-21

## 2021-08-16 ENCOUNTER — NURSE TRIAGE (OUTPATIENT)
Dept: OTHER | Facility: CLINIC | Age: 86
End: 2021-08-16

## 2021-08-16 NOTE — TELEPHONE ENCOUNTER
Received call from [place] at [facility] with Red Flag Complaint. Brief description of triage: no triage, info only seen by specialist last week and he intructed her to see PCP. No worsening symptoms stated. Care advice provided, patient verbalizes understanding; denies any other questions or concerns; instructed to call back for any new or worsening symptoms. Writer provided warm transfer to [facility] for appointment scheduling. Attention Provider: Thank you for allowing me to participate in the care of your patient. The patient was connected to triage in response to information provided to the E[facility]. Please do not respond through this encounter as the response is not directed to a shared pool. Answer Assessment - Initial Assessment Questions  1. REASON FOR CALL or QUESTION: \"What is your reason for calling today? \" or \"How can I best help you? \" or \"What question do you have that I can help answer? Saw Cardiologist 1 week ago instructed her to see PCP for arm pain.    \"no worse or better    Protocols used: INFORMATION ONLY CALL - NO TRIAGE-ADULT-[place]

## 2021-09-21 DIAGNOSIS — F41.9 ANXIETY: ICD-10-CM

## 2021-09-21 RX ORDER — LORAZEPAM 0.5 MG/1
TABLET ORAL
Qty: 90 TABLET | Refills: 2 | Status: SHIPPED | OUTPATIENT
Start: 2021-09-21 | End: 2021-12-20

## 2022-06-01 DIAGNOSIS — E03.9 ACQUIRED HYPOTHYROIDISM: ICD-10-CM

## 2022-06-01 RX ORDER — LEVOTHYROXINE SODIUM 0.05 MG/1
50 TABLET ORAL DAILY
Qty: 30 TABLET | Refills: 0 | Status: SHIPPED | OUTPATIENT
Start: 2022-06-01

## 2022-09-14 NOTE — TELEPHONE ENCOUNTER
Has been in and out of afib for 1 week b/p149/90 . She feels just terrible and doesn't think she can wait until 12/21/17 to be seen . Can LES see her tomorrow? Principal Discharge DX:	Anxiety   1

## (undated) DEVICE — SUTURE MCRYL SZ 3-0 L27IN ABSRB UD L24MM PS-1 3/8 CIR PRIM Y936H

## (undated) DEVICE — STERILE LATEX POWDER-FREE SURGICAL GLOVESWITH NITRILE COATING: Brand: PROTEXIS

## (undated) DEVICE — COVER LT HNDL BLU PLAS

## (undated) DEVICE — SUTURE VCRL SZ 0 L36IN ABSRB UD L36MM CT-1 1/2 CIR J946H

## (undated) DEVICE — APPLICATOR GRN CHLORAPREP 2% CHG 70

## (undated) DEVICE — COVER,MAYO STAND,XL,STERILE: Brand: MEDLINE

## (undated) DEVICE — Device

## (undated) DEVICE — SPONGE LAP W18XL18IN WHT COT 4 PLY FLD STRUNG RADPQ DISP ST

## (undated) DEVICE — HIP PILLOW, ABDUCTION: Brand: DEROYAL

## (undated) DEVICE — CONTAINER,SPECIMEN,OR STERILE,4OZ: Brand: MEDLINE

## (undated) DEVICE — INTENDED TO AID IN THE PASSING OF SUTURES THROUGH BONE AND SOFT TISSUE DURING ORTHOPEDIC SURGERY: Brand: HOFFEE SUTURE RETRIEVER

## (undated) DEVICE — DRESSING CNTCT 4X12IN IS THERABOND 3D

## (undated) DEVICE — TOTAL BASIC PK

## (undated) DEVICE — MERCY FAIRFIELD TURNOVER KIT: Brand: MEDLINE INDUSTRIES, INC.

## (undated) DEVICE — SUTURE VCRL SZ 2-0 L18IN ABSRB UD CT-1 L36MM 1/2 CIR J839D

## (undated) DEVICE — HOOD: Brand: FLYTE

## (undated) DEVICE — STERILE POLYISOPRENE POWDER-FREE SURGICAL GLOVES: Brand: PROTEXIS

## (undated) DEVICE — BLADE SAW RECIP HVY DUTY LNG 27796327] STRYKER CORP]

## (undated) DEVICE — SUTURE VCRL SZ 1 L36IN ABSRB UD L36MM CT-1 1/2 CIR J947H

## (undated) DEVICE — HOOD, PEEL-AWAY: Brand: FLYTE

## (undated) DEVICE — 2108 SERIES SAGITTAL BLADE (24.8 X 0.88 X 73.8MM)

## (undated) DEVICE — DRAPE,U/ SHT,SPLIT,PLAS,STERIL: Brand: MEDLINE

## (undated) DEVICE — SUTURE STRATAFIX SZ 1 L14IN ABSRB VLT L36MM MO-4 TAPERPOINT SXPD2B400

## (undated) DEVICE — 3M™ IOBAN™ 2 ANTIMICROBIAL INCISE DRAPE 6650EZ: Brand: IOBAN™ 2

## (undated) DEVICE — NEEDLE HYPO 22GA L1.5IN BLK POLYPR HUB S STL REG BVL STR

## (undated) DEVICE — SYRINGE MED 30ML STD CLR PLAS LUERLOCK TIP N CTRL DISP

## (undated) DEVICE — HANDPIECE SET WITH HIGH FLOW TIP AND SUCTION TUBE: Brand: INTERPULSE